# Patient Record
Sex: FEMALE | Race: WHITE | NOT HISPANIC OR LATINO | URBAN - METROPOLITAN AREA
[De-identification: names, ages, dates, MRNs, and addresses within clinical notes are randomized per-mention and may not be internally consistent; named-entity substitution may affect disease eponyms.]

---

## 2017-09-27 ENCOUNTER — INPATIENT (INPATIENT)
Facility: HOSPITAL | Age: 72
LOS: 1 days | Discharge: HOME | DRG: 564 | End: 2017-09-29
Attending: INTERNAL MEDICINE | Admitting: STUDENT IN AN ORGANIZED HEALTH CARE EDUCATION/TRAINING PROGRAM
Payer: MEDICARE

## 2017-09-27 DIAGNOSIS — F41.0 PANIC DISORDER [EPISODIC PAROXYSMAL ANXIETY]: ICD-10-CM
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
## 2017-09-27 DIAGNOSIS — Z86.19 PERSONAL HISTORY OF OTHER INFECTIOUS AND PARASITIC DISEASES: ICD-10-CM

## 2017-09-27 DIAGNOSIS — M62.82 RHABDOMYOLYSIS: ICD-10-CM

## 2017-09-27 DIAGNOSIS — S14.3XXA INJURY OF BRACHIAL PLEXUS, INITIAL ENCOUNTER: ICD-10-CM

## 2017-09-27 DIAGNOSIS — F33.9 MAJOR DEPRESSIVE DISORDER, RECURRENT, UNSPECIFIED: ICD-10-CM

## 2017-09-27 DIAGNOSIS — G93.40 ENCEPHALOPATHY, UNSPECIFIED: ICD-10-CM
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
## 2017-09-27 DIAGNOSIS — F17.210 NICOTINE DEPENDENCE, CIGARETTES, UNCOMPLICATED: ICD-10-CM

## 2017-09-27 DIAGNOSIS — T79.6XXA TRAUMATIC ISCHEMIA OF MUSCLE, INITIAL ENCOUNTER: ICD-10-CM

## 2017-09-27 DIAGNOSIS — M19.90 UNSPECIFIED OSTEOARTHRITIS, UNSPECIFIED SITE: ICD-10-CM

## 2017-09-27 PROCEDURE — 73060 X-RAY EXAM OF HUMERUS: CPT | Mod: 26,LT

## 2017-09-27 PROCEDURE — 71010: CPT | Mod: 26

## 2017-09-27 PROCEDURE — 72125 CT NECK SPINE W/O DYE: CPT | Mod: 26

## 2017-09-27 PROCEDURE — 70450 CT HEAD/BRAIN W/O DYE: CPT | Mod: 26

## 2017-09-27 PROCEDURE — 93010 ELECTROCARDIOGRAM REPORT: CPT

## 2017-09-27 PROCEDURE — 99223 1ST HOSP IP/OBS HIGH 75: CPT

## 2017-09-27 PROCEDURE — 73030 X-RAY EXAM OF SHOULDER: CPT | Mod: 26,LT

## 2017-09-28 ENCOUNTER — APPOINTMENT (OUTPATIENT)
Dept: MRI IMAGING | Facility: HOSPITAL | Age: 72
End: 2017-09-28

## 2017-09-28 PROCEDURE — 90792 PSYCH DIAG EVAL W/MED SRVCS: CPT

## 2017-09-28 PROCEDURE — 99233 SBSQ HOSP IP/OBS HIGH 50: CPT

## 2017-09-28 PROCEDURE — 93010 ELECTROCARDIOGRAM REPORT: CPT

## 2017-09-28 PROCEDURE — 70551 MRI BRAIN STEM W/O DYE: CPT | Mod: 26

## 2017-09-28 PROCEDURE — 93306 TTE W/DOPPLER COMPLETE: CPT | Mod: 26

## 2017-09-28 PROCEDURE — 72141 MRI NECK SPINE W/O DYE: CPT | Mod: 26

## 2017-09-29 PROCEDURE — 99233 SBSQ HOSP IP/OBS HIGH 50: CPT

## 2017-10-19 PROCEDURE — 80076 HEPATIC FUNCTION PANEL: CPT

## 2017-10-19 PROCEDURE — 82746 ASSAY OF FOLIC ACID SERUM: CPT

## 2017-10-19 PROCEDURE — 95812 EEG 41-60 MINUTES: CPT

## 2017-10-19 PROCEDURE — 80061 LIPID PANEL: CPT

## 2017-10-19 PROCEDURE — 85730 THROMBOPLASTIN TIME PARTIAL: CPT

## 2017-10-19 PROCEDURE — 82550 ASSAY OF CK (CPK): CPT

## 2017-10-19 PROCEDURE — 93306 TTE W/DOPPLER COMPLETE: CPT

## 2017-10-19 PROCEDURE — 70551 MRI BRAIN STEM W/O DYE: CPT

## 2017-10-19 PROCEDURE — 80053 COMPREHEN METABOLIC PANEL: CPT

## 2017-10-19 PROCEDURE — 96374 THER/PROPH/DIAG INJ IV PUSH: CPT

## 2017-10-19 PROCEDURE — 72141 MRI NECK SPINE W/O DYE: CPT

## 2017-10-19 PROCEDURE — 73060 X-RAY EXAM OF HUMERUS: CPT

## 2017-10-19 PROCEDURE — 84484 ASSAY OF TROPONIN QUANT: CPT

## 2017-10-19 PROCEDURE — 97116 GAIT TRAINING THERAPY: CPT

## 2017-10-19 PROCEDURE — 84443 ASSAY THYROID STIM HORMONE: CPT

## 2017-10-19 PROCEDURE — 73030 X-RAY EXAM OF SHOULDER: CPT

## 2017-10-19 PROCEDURE — 83036 HEMOGLOBIN GLYCOSYLATED A1C: CPT

## 2017-10-19 PROCEDURE — 82607 VITAMIN B-12: CPT

## 2017-10-19 PROCEDURE — 71045 X-RAY EXAM CHEST 1 VIEW: CPT

## 2017-10-19 PROCEDURE — 93005 ELECTROCARDIOGRAM TRACING: CPT

## 2017-10-19 PROCEDURE — 85027 COMPLETE CBC AUTOMATED: CPT

## 2017-10-19 PROCEDURE — 80307 DRUG TEST PRSMV CHEM ANLYZR: CPT

## 2017-10-19 PROCEDURE — 85610 PROTHROMBIN TIME: CPT

## 2017-10-19 PROCEDURE — 82553 CREATINE MB FRACTION: CPT

## 2017-10-19 PROCEDURE — 72125 CT NECK SPINE W/O DYE: CPT

## 2017-10-19 PROCEDURE — 99285 EMERGENCY DEPT VISIT HI MDM: CPT | Mod: 25

## 2017-10-19 PROCEDURE — 80048 BASIC METABOLIC PNL TOTAL CA: CPT

## 2017-10-19 PROCEDURE — 70450 CT HEAD/BRAIN W/O DYE: CPT

## 2017-10-19 PROCEDURE — 97162 PT EVAL MOD COMPLEX 30 MIN: CPT

## 2021-04-07 ENCOUNTER — INPATIENT (INPATIENT)
Facility: HOSPITAL | Age: 76
LOS: 1 days | Discharge: HOME | DRG: 603 | End: 2021-04-09
Attending: STUDENT IN AN ORGANIZED HEALTH CARE EDUCATION/TRAINING PROGRAM | Admitting: INTERNAL MEDICINE
Payer: MEDICARE

## 2021-04-07 VITALS
OXYGEN SATURATION: 100 % | HEART RATE: 86 BPM | WEIGHT: 149.91 LBS | HEIGHT: 60 IN | RESPIRATION RATE: 18 BRPM | SYSTOLIC BLOOD PRESSURE: 148 MMHG | TEMPERATURE: 98 F | DIASTOLIC BLOOD PRESSURE: 80 MMHG

## 2021-04-07 DIAGNOSIS — L03.119 CELLULITIS OF UNSPECIFIED PART OF LIMB: ICD-10-CM

## 2021-04-07 LAB
ALBUMIN SERPL ELPH-MCNC: 3.2 G/DL — LOW (ref 3.3–5)
ALBUMIN SERPL ELPH-MCNC: 3.5 G/DL — SIGNIFICANT CHANGE UP (ref 3.3–5)
ALP SERPL-CCNC: 81 U/L — SIGNIFICANT CHANGE UP (ref 40–120)
ALP SERPL-CCNC: 95 U/L — SIGNIFICANT CHANGE UP (ref 40–120)
ALT FLD-CCNC: 30 U/L — SIGNIFICANT CHANGE UP (ref 10–45)
ALT FLD-CCNC: 36 U/L — SIGNIFICANT CHANGE UP (ref 10–45)
ANION GAP SERPL CALC-SCNC: 11 MMOL/L — SIGNIFICANT CHANGE UP (ref 5–17)
ANION GAP SERPL CALC-SCNC: 12 MMOL/L — SIGNIFICANT CHANGE UP (ref 5–17)
ANION GAP SERPL CALC-SCNC: 7 MMOL/L — SIGNIFICANT CHANGE UP (ref 5–17)
APPEARANCE UR: ABNORMAL
AST SERPL-CCNC: 26 U/L — SIGNIFICANT CHANGE UP (ref 10–40)
AST SERPL-CCNC: 89 U/L — HIGH (ref 10–40)
BASOPHILS # BLD AUTO: 0.06 K/UL — SIGNIFICANT CHANGE UP (ref 0–0.2)
BASOPHILS NFR BLD AUTO: 0.6 % — SIGNIFICANT CHANGE UP (ref 0–2)
BILIRUB SERPL-MCNC: 0.2 MG/DL — SIGNIFICANT CHANGE UP (ref 0.2–1.2)
BILIRUB SERPL-MCNC: 0.7 MG/DL — SIGNIFICANT CHANGE UP (ref 0.2–1.2)
BILIRUB UR-MCNC: NEGATIVE — SIGNIFICANT CHANGE UP
BUN SERPL-MCNC: 20 MG/DL — SIGNIFICANT CHANGE UP (ref 7–23)
BUN SERPL-MCNC: 22 MG/DL — SIGNIFICANT CHANGE UP (ref 7–23)
BUN SERPL-MCNC: 22 MG/DL — SIGNIFICANT CHANGE UP (ref 7–23)
CALCIUM SERPL-MCNC: 9.2 MG/DL — SIGNIFICANT CHANGE UP (ref 8.4–10.5)
CALCIUM SERPL-MCNC: 9.3 MG/DL — SIGNIFICANT CHANGE UP (ref 8.4–10.5)
CALCIUM SERPL-MCNC: 9.5 MG/DL — SIGNIFICANT CHANGE UP (ref 8.4–10.5)
CHLORIDE SERPL-SCNC: 106 MMOL/L — SIGNIFICANT CHANGE UP (ref 96–108)
CHLORIDE SERPL-SCNC: 111 MMOL/L — HIGH (ref 96–108)
CHLORIDE SERPL-SCNC: 111 MMOL/L — HIGH (ref 96–108)
CK SERPL-CCNC: 122 U/L — SIGNIFICANT CHANGE UP (ref 25–170)
CO2 SERPL-SCNC: 24 MMOL/L — SIGNIFICANT CHANGE UP (ref 22–31)
CO2 SERPL-SCNC: 25 MMOL/L — SIGNIFICANT CHANGE UP (ref 22–31)
CO2 SERPL-SCNC: 26 MMOL/L — SIGNIFICANT CHANGE UP (ref 22–31)
COLOR SPEC: YELLOW — SIGNIFICANT CHANGE UP
CREAT SERPL-MCNC: 1 MG/DL — SIGNIFICANT CHANGE UP (ref 0.5–1.3)
CREAT SERPL-MCNC: 1.11 MG/DL — SIGNIFICANT CHANGE UP (ref 0.5–1.3)
CREAT SERPL-MCNC: 1.14 MG/DL — SIGNIFICANT CHANGE UP (ref 0.5–1.3)
DIFF PNL FLD: ABNORMAL
EOSINOPHIL # BLD AUTO: 0.22 K/UL — SIGNIFICANT CHANGE UP (ref 0–0.5)
EOSINOPHIL NFR BLD AUTO: 2.2 % — SIGNIFICANT CHANGE UP (ref 0–6)
GLUCOSE SERPL-MCNC: 94 MG/DL — SIGNIFICANT CHANGE UP (ref 70–99)
GLUCOSE SERPL-MCNC: 96 MG/DL — SIGNIFICANT CHANGE UP (ref 70–99)
GLUCOSE SERPL-MCNC: 96 MG/DL — SIGNIFICANT CHANGE UP (ref 70–99)
GLUCOSE UR QL: NEGATIVE — SIGNIFICANT CHANGE UP
HCT VFR BLD CALC: 44.1 % — SIGNIFICANT CHANGE UP (ref 34.5–45)
HGB BLD-MCNC: 14.2 G/DL — SIGNIFICANT CHANGE UP (ref 11.5–15.5)
IMM GRANULOCYTES NFR BLD AUTO: 0.3 % — SIGNIFICANT CHANGE UP (ref 0–1.5)
KETONES UR-MCNC: NEGATIVE — SIGNIFICANT CHANGE UP
LEUKOCYTE ESTERASE UR-ACNC: ABNORMAL
LYMPHOCYTES # BLD AUTO: 3.26 K/UL — SIGNIFICANT CHANGE UP (ref 1–3.3)
LYMPHOCYTES # BLD AUTO: 32.9 % — SIGNIFICANT CHANGE UP (ref 13–44)
MAGNESIUM SERPL-MCNC: 2.1 MG/DL — SIGNIFICANT CHANGE UP (ref 1.6–2.6)
MAGNESIUM SERPL-MCNC: 2.1 MG/DL — SIGNIFICANT CHANGE UP (ref 1.6–2.6)
MCHC RBC-ENTMCNC: 30 PG — SIGNIFICANT CHANGE UP (ref 27–34)
MCHC RBC-ENTMCNC: 32.2 GM/DL — SIGNIFICANT CHANGE UP (ref 32–36)
MCV RBC AUTO: 93 FL — SIGNIFICANT CHANGE UP (ref 80–100)
MONOCYTES # BLD AUTO: 0.85 K/UL — SIGNIFICANT CHANGE UP (ref 0–0.9)
MONOCYTES NFR BLD AUTO: 8.6 % — SIGNIFICANT CHANGE UP (ref 2–14)
NEUTROPHILS # BLD AUTO: 5.49 K/UL — SIGNIFICANT CHANGE UP (ref 1.8–7.4)
NEUTROPHILS NFR BLD AUTO: 55.4 % — SIGNIFICANT CHANGE UP (ref 43–77)
NITRITE UR-MCNC: POSITIVE
NRBC # BLD: 0 /100 WBCS — SIGNIFICANT CHANGE UP (ref 0–0)
PH UR: 5 — SIGNIFICANT CHANGE UP (ref 5–8)
PLATELET # BLD AUTO: 296 K/UL — SIGNIFICANT CHANGE UP (ref 150–400)
POTASSIUM SERPL-MCNC: 3.8 MMOL/L — SIGNIFICANT CHANGE UP (ref 3.5–5.3)
POTASSIUM SERPL-MCNC: 3.9 MMOL/L — SIGNIFICANT CHANGE UP (ref 3.5–5.3)
POTASSIUM SERPL-MCNC: >10 MMOL/L — CRITICAL HIGH (ref 3.5–5.3)
POTASSIUM SERPL-SCNC: 3.8 MMOL/L — SIGNIFICANT CHANGE UP (ref 3.5–5.3)
POTASSIUM SERPL-SCNC: 3.9 MMOL/L — SIGNIFICANT CHANGE UP (ref 3.5–5.3)
POTASSIUM SERPL-SCNC: >10 MMOL/L — CRITICAL HIGH (ref 3.5–5.3)
PROCALCITONIN SERPL-MCNC: 0.02 NG/ML — SIGNIFICANT CHANGE UP
PROT SERPL-MCNC: 6.6 G/DL — SIGNIFICANT CHANGE UP (ref 6–8.3)
PROT SERPL-MCNC: 8.4 G/DL — HIGH (ref 6–8.3)
PROT UR-MCNC: 100
RBC # BLD: 4.74 M/UL — SIGNIFICANT CHANGE UP (ref 3.8–5.2)
RBC # FLD: 14.1 % — SIGNIFICANT CHANGE UP (ref 10.3–14.5)
SARS-COV-2 RNA SPEC QL NAA+PROBE: SIGNIFICANT CHANGE UP
SODIUM SERPL-SCNC: 138 MMOL/L — SIGNIFICANT CHANGE UP (ref 135–145)
SODIUM SERPL-SCNC: 147 MMOL/L — HIGH (ref 135–145)
SODIUM SERPL-SCNC: 148 MMOL/L — HIGH (ref 135–145)
SP GR SPEC: 1.02 — SIGNIFICANT CHANGE UP (ref 1.01–1.02)
TROPONIN I SERPL-MCNC: <.017 NG/ML — LOW (ref 0.02–0.06)
UROBILINOGEN FLD QL: NEGATIVE — SIGNIFICANT CHANGE UP
WBC # BLD: 9.91 K/UL — SIGNIFICANT CHANGE UP (ref 3.8–10.5)
WBC # FLD AUTO: 9.91 K/UL — SIGNIFICANT CHANGE UP (ref 3.8–10.5)

## 2021-04-07 PROCEDURE — 71045 X-RAY EXAM CHEST 1 VIEW: CPT | Mod: 26

## 2021-04-07 PROCEDURE — 72110 X-RAY EXAM L-2 SPINE 4/>VWS: CPT | Mod: 26

## 2021-04-07 PROCEDURE — 93010 ELECTROCARDIOGRAM REPORT: CPT | Mod: 76

## 2021-04-07 PROCEDURE — 99285 EMERGENCY DEPT VISIT HI MDM: CPT | Mod: CS

## 2021-04-07 PROCEDURE — 99222 1ST HOSP IP/OBS MODERATE 55: CPT

## 2021-04-07 RX ORDER — SIMVASTATIN 20 MG/1
10 TABLET, FILM COATED ORAL AT BEDTIME
Refills: 0 | Status: DISCONTINUED | OUTPATIENT
Start: 2021-04-07 | End: 2021-04-09

## 2021-04-07 RX ORDER — ACETAMINOPHEN 500 MG
975 TABLET ORAL ONCE
Refills: 0 | Status: COMPLETED | OUTPATIENT
Start: 2021-04-07 | End: 2021-04-07

## 2021-04-07 RX ORDER — CEFTRIAXONE 500 MG/1
1000 INJECTION, POWDER, FOR SOLUTION INTRAMUSCULAR; INTRAVENOUS EVERY 24 HOURS
Refills: 0 | Status: DISCONTINUED | OUTPATIENT
Start: 2021-04-07 | End: 2021-04-09

## 2021-04-07 RX ORDER — MORPHINE SULFATE 50 MG/1
2 CAPSULE, EXTENDED RELEASE ORAL EVERY 6 HOURS
Refills: 0 | Status: DISCONTINUED | OUTPATIENT
Start: 2021-04-07 | End: 2021-04-09

## 2021-04-07 RX ORDER — ACETAMINOPHEN 500 MG
650 TABLET ORAL EVERY 6 HOURS
Refills: 0 | Status: DISCONTINUED | OUTPATIENT
Start: 2021-04-07 | End: 2021-04-09

## 2021-04-07 RX ORDER — GABAPENTIN 400 MG/1
100 CAPSULE ORAL THREE TIMES A DAY
Refills: 0 | Status: DISCONTINUED | OUTPATIENT
Start: 2021-04-07 | End: 2021-04-09

## 2021-04-07 RX ORDER — IBUPROFEN 200 MG
400 TABLET ORAL THREE TIMES A DAY
Refills: 0 | Status: DISCONTINUED | OUTPATIENT
Start: 2021-04-07 | End: 2021-04-09

## 2021-04-07 RX ORDER — OXYCODONE AND ACETAMINOPHEN 5; 325 MG/1; MG/1
1 TABLET ORAL EVERY 4 HOURS
Refills: 0 | Status: DISCONTINUED | OUTPATIENT
Start: 2021-04-07 | End: 2021-04-09

## 2021-04-07 RX ORDER — IBUPROFEN 200 MG
400 TABLET ORAL ONCE
Refills: 0 | Status: COMPLETED | OUTPATIENT
Start: 2021-04-07 | End: 2021-04-07

## 2021-04-07 RX ORDER — DULOXETINE HYDROCHLORIDE 30 MG/1
20 CAPSULE, DELAYED RELEASE ORAL
Refills: 0 | Status: DISCONTINUED | OUTPATIENT
Start: 2021-04-07 | End: 2021-04-09

## 2021-04-07 RX ORDER — ENOXAPARIN SODIUM 100 MG/ML
40 INJECTION SUBCUTANEOUS DAILY
Refills: 0 | Status: DISCONTINUED | OUTPATIENT
Start: 2021-04-07 | End: 2021-04-09

## 2021-04-07 RX ORDER — NICOTINE POLACRILEX 2 MG
1 GUM BUCCAL DAILY
Refills: 0 | Status: DISCONTINUED | OUTPATIENT
Start: 2021-04-07 | End: 2021-04-09

## 2021-04-07 RX ADMIN — OXYCODONE AND ACETAMINOPHEN 1 TABLET(S): 5; 325 TABLET ORAL at 20:13

## 2021-04-07 RX ADMIN — Medication 400 MILLIGRAM(S): at 04:00

## 2021-04-07 RX ADMIN — OXYCODONE AND ACETAMINOPHEN 1 TABLET(S): 5; 325 TABLET ORAL at 15:18

## 2021-04-07 RX ADMIN — Medication 400 MILLIGRAM(S): at 03:19

## 2021-04-07 RX ADMIN — OXYCODONE AND ACETAMINOPHEN 1 TABLET(S): 5; 325 TABLET ORAL at 14:48

## 2021-04-07 RX ADMIN — Medication 975 MILLIGRAM(S): at 04:00

## 2021-04-07 RX ADMIN — GABAPENTIN 100 MILLIGRAM(S): 400 CAPSULE ORAL at 20:13

## 2021-04-07 RX ADMIN — Medication 975 MILLIGRAM(S): at 03:19

## 2021-04-07 RX ADMIN — DULOXETINE HYDROCHLORIDE 20 MILLIGRAM(S): 30 CAPSULE, DELAYED RELEASE ORAL at 12:13

## 2021-04-07 RX ADMIN — Medication 1 TABLET(S): at 12:12

## 2021-04-07 RX ADMIN — GABAPENTIN 100 MILLIGRAM(S): 400 CAPSULE ORAL at 12:12

## 2021-04-07 RX ADMIN — ENOXAPARIN SODIUM 40 MILLIGRAM(S): 100 INJECTION SUBCUTANEOUS at 12:12

## 2021-04-07 RX ADMIN — MORPHINE SULFATE 2 MILLIGRAM(S): 50 CAPSULE, EXTENDED RELEASE ORAL at 16:28

## 2021-04-07 RX ADMIN — DULOXETINE HYDROCHLORIDE 20 MILLIGRAM(S): 30 CAPSULE, DELAYED RELEASE ORAL at 22:03

## 2021-04-07 RX ADMIN — Medication 100 MILLIGRAM(S): at 05:15

## 2021-04-07 RX ADMIN — Medication 1 PATCH: at 12:12

## 2021-04-07 RX ADMIN — CEFTRIAXONE 100 MILLIGRAM(S): 500 INJECTION, POWDER, FOR SOLUTION INTRAMUSCULAR; INTRAVENOUS at 06:08

## 2021-04-07 RX ADMIN — Medication 1 PATCH: at 19:43

## 2021-04-07 RX ADMIN — OXYCODONE AND ACETAMINOPHEN 1 TABLET(S): 5; 325 TABLET ORAL at 20:36

## 2021-04-07 RX ADMIN — SIMVASTATIN 10 MILLIGRAM(S): 20 TABLET, FILM COATED ORAL at 22:03

## 2021-04-07 NOTE — H&P ADULT - ASSESSMENT
76 y/o female with obesity, HLD, anxiety, agoraphobia, BIBEMS to the ED because of back pain, s/p sliding from toilet bowl.  Pt walks with a walker, has poor balance. Also noted to have increased leg swelling and redness. However,  states that gait problem has worsened for past few months, and has difficulty managing pt at home.   Noted to have hyperkalemia, called lab and lab states specimen severely hemolyzed.  Back pain, s/p fall, Gait problem  UTI, Cellulitis, Left leg >right leg, PVD  Smoker  Hyperkalemia (specimen hemolyzed >10?) - no notable EKG changes    Admit  IV Ceftriaxone pending cultures  Analgesics prn  Leg Dopplers to r/o DVT  Repeat Lab stat - re hyperkalemia   PT eval, social work referral  Cont current meds: Cymbalta, gabapentin, Simvastatin  DVT prophylaxis    IMPROVE VTE Individual Risk Assessment  RISK                                                                Points  [  ] Previous VTE                                                  3  [  ] Thrombophilia                                               2  [  ] Lower limb paralysis                                      2        (unable to hold up >15 seconds)    [  ] Current Cancer                                              2         (within 6 months)  [x  ] Immobilization > 24 hrs                                1  [  ] ICU/CCU stay > 24 hours                              1  [ x ] Age > 60                                                      1  IMPROVE VTE Score __2_______  IMPROVE Score 0-1: Low Risk, No VTE prophylaxis required for most patients, encourage ambulation.   IMPROVE Score 2-3: At risk, pharmacologic VTE prophylaxis is indicated for most patients (in the absence of a contraindication)  IMPROVE Score > or = 4: High Risk, pharmacologic VTE prophylaxis is indicated for most patients (in the absence of a contraindication) 76 y/o female with obesity, HLD, anxiety, agoraphobia, BIBEMS to the ED because of back pain, s/p sliding from toilet bowl.  Pt walks with a walker, has poor balance. Also noted to have increased leg swelling and redness. However,  states that gait problem has worsened for past few months, and has difficulty managing pt at home.   Noted to have hyperkalemia, called lab and lab states specimen severely hemolyzed.  Back pain, s/p fall, Gait problem  UTI, Cellulitis, Left leg >right leg, PVD  Smoker  Hyperkalemia (specimen hemolyzed >10?) - no notable EKG changes    Admit  IV Ceftriaxone pending cultures  Analgesics prn  Leg Dopplers to r/o DVT  smoking cessation- counseled, Nicotine patch  Repeat Lab stat - re hyperkalemia   PT eval, social work referral  Cont current meds: Cymbalta, gabapentin, Simvastatin  DVT prophylaxis    IMPROVE VTE Individual Risk Assessment  RISK                                                                Points  [  ] Previous VTE                                                  3  [  ] Thrombophilia                                               2  [  ] Lower limb paralysis                                      2        (unable to hold up >15 seconds)    [  ] Current Cancer                                              2         (within 6 months)  [x  ] Immobilization > 24 hrs                                1  [  ] ICU/CCU stay > 24 hours                              1  [ x ] Age > 60                                                      1  IMPROVE VTE Score __2_______  IMPROVE Score 0-1: Low Risk, No VTE prophylaxis required for most patients, encourage ambulation.   IMPROVE Score 2-3: At risk, pharmacologic VTE prophylaxis is indicated for most patients (in the absence of a contraindication)  IMPROVE Score > or = 4: High Risk, pharmacologic VTE prophylaxis is indicated for most patients (in the absence of a contraindication)

## 2021-04-07 NOTE — ED ADULT NURSE NOTE - OBJECTIVE STATEMENT
Patient BIBA from home s/p mechanical fall. Patient denies head strike or LOC. Patient not on anticoagulants. Complaining of 9/10 back pain at this time. Lower extremities appear cellulitic.

## 2021-04-07 NOTE — PHYSICAL THERAPY INITIAL EVALUATION ADULT - ADDITIONAL COMMENTS
pt lives in private house w/spouse and dtr, 1 aleks w/rail, no stairs in house that pt uses. pt uses a rollator to ambulate in house, also has a straight cane. pt states she is independent w/most ADL's but needs assist w/showering

## 2021-04-07 NOTE — ED PROVIDER NOTE - CLINICAL SUMMARY MEDICAL DECISION MAKING FREE TEXT BOX
74 yo F s/p fall off toilet tonight with low back pain. no neuro deficit. check xray LS Spine r/o fx vs contusion/sprain. chronic debilitation, unable to walk, worsening general condition, weakness.  r/o leg cellulitis. r/o uti, electrolyte abnormality, anemia. reassess 74 yo F s/p fall off toilet tonight with low back pain. no neuro deficit. check xray LS Spine r/o fx vs contusion/sprain. chronic debilitation, unable to walk, worsening general condition, weakness.  + leg cellulitis. r/o uti, electrolyte abnormality, anemia. reassess

## 2021-04-07 NOTE — ED PROVIDER NOTE - PMH
Anxiety    PTSD (post-traumatic stress disorder)     Anxiety    High cholesterol    Hypertension    PTSD (post-traumatic stress disorder)

## 2021-04-07 NOTE — CHART NOTE - NSCHARTNOTEFT_GEN_A_CORE
H+P reviewed from this AM.    Repeat BMP shows normal potassium (>10 potassium overnight reported as hemolyzed)    Patient awaiting PT consult for possible [facility] placement secondary to frequent falls.    Likely will need 3 night stay for medicare requirement.     Will continue to follow today.

## 2021-04-07 NOTE — H&P ADULT - HISTORY OF PRESENT ILLNESS
76 y/o female with obesity, HLD, anxiety, agoraphobia, BIBEMS to the ED because of backpain, s/p sliding from toilet bowl.  Pt walks with a walker, has poor balance.  She denies hitting head nor LOC, just hit her buttocks, sliding off toilet bowl when she was about to wipe herself.  Pt apparently has been having difficulty walking for several months, walks with walker, but has severe anxiety and does not want to get out of house.  Has bilateral LE swelling, noted to be with increased redness and warmth for the past week.  She denies fever, chills, chest pain, dyspnea. Pt states she has PT that comes to the house and she does PT 3 x a week.  However,  states that gait problem has worsened for past few months, and has difficulty managing pt at home.  74 y/o female with obesity, HLD, anxiety, agoraphobia, BIBEMS to the ED because of backpain, s/p sliding from toilet bowl.  Pt walks with a walker, has poor balance.  She denies hitting head nor LOC, just hit her buttocks, sliding off toilet bowl when she was about to wipe herself.  Pt apparently has been having difficulty walking for several months, walks with walker, but has severe anxiety and does not want to get out of house.  Has bilateral LE swelling, noted to be with increased redness and warmth for the past week.  She denies fever, chills, chest pain, dyspnea,abd pain, dysuria. Pt states she has PT that comes to the house and she does PT 3 x a week.  However,  states that gait problem has worsened for past few months, and has difficulty managing pt at home.

## 2021-04-07 NOTE — ED PROVIDER NOTE - CARE PLAN
Principal Discharge DX:	Cellulitis of lower extremity, unspecified laterality  Secondary Diagnosis:	Acute midline low back pain without sciatica

## 2021-04-07 NOTE — H&P ADULT - NSHPPHYSICALEXAM_GEN_ALL_CORE
Vital Signs (24 Hrs):  T(C): 36.4 (04-07-21 @ 02:34), Max: 36.4 (04-07-21 @ 02:34)  HR: 86 (04-07-21 @ 02:34) (86 - 86)  BP: 148/80 (04-07-21 @ 02:34) (148/80 - 148/80)  RR: 18 (04-07-21 @ 02:34) (18 - 18)  SpO2: 100% (04-07-21 @ 02:34) (100% - 100%)  Daily Height in cm: 152.4 (07 Apr 2021 02:34)

## 2021-04-07 NOTE — ED PROVIDER NOTE - OBJECTIVE STATEMENT
pt c/o low back pain, sharp, moderate, after fall off toilet about 30min pta tonight. pt was sitting on new toilet that is higher than her old one and was trying to change her diaper when she slid off toilet and fell on her buttock.  c/o back pain after. no numbness/weakness/incontinence. did not hit head. no headache/LOC. no recent illness. no fever/chills, chest pain, abd pain sob. pt has h/o chronic pain, is scheduled to see pain management this week. pt c/o low back pain, sharp, moderate, after fall off toilet about 30min pta tonight. pt was sitting on new toilet that is higher than her old one and was trying to change her diaper when she slid off toilet and fell on her buttock.  c/o back pain after. no numbness/weakness/incontinence. did not hit head. no headache/LOC. no recent illness. no fever/chills, chest pain, abd pain sob. pt has h/o chronic pain, is scheduled to see pain management this week. pt reports unable to walk for last 6 months, with worsening leg pain/swelling/redness recently.  states PMD Reyes had recommended admission and rehab in past but pt suffers from agoraphobia and has not wanted to leave home. pt c/o low back pain, sharp, moderate, after fall off toilet about 30min pta tonight. pt was sitting on new toilet that is higher than her old one and was trying to change her diaper when she slid off toilet and fell on her buttock.  c/o back pain after. no numbness/weakness/incontinence. did not hit head. no headache/LOC. no recent illness. no fever/chills, chest pain, abd pain sob. pt reports unable to walk for last 6 months, with worsening leg pain/swelling/redness recently.  states PMD Reyes had recommended admission and rehab in past but pt suffers from agoraphobia and has not wanted to leave home.

## 2021-04-07 NOTE — ED PROVIDER NOTE - MUSCULOSKELETAL, MLM
no c/t spine ttp. mild lower lumbar/sacral midline ttp.   mild bilat lower leg edema with moderate erythema, mildly increased warmth

## 2021-04-07 NOTE — H&P ADULT - NSICDXPASTMEDICALHX_GEN_ALL_CORE_FT
PAST MEDICAL HISTORY:  Agoraphobia severe as per  - has not left house since 2016 (is being seen by NP at home)    Anxiety     High cholesterol     Hypertension     PTSD (post-traumatic stress disorder)

## 2021-04-07 NOTE — ED ADULT NURSE NOTE - PMH
Agoraphobia  severe as per  - has not left house since 2016 (is being seen by NP at home)  Anxiety    High cholesterol    Hypertension    PTSD (post-traumatic stress disorder)

## 2021-04-07 NOTE — INPATIENT CERTIFICATION FOR MEDICARE PATIENTS - RISKS OF ADVERSE EVENTS
Concern for delay in diagnosis and treatment frequent falls, gait abnormality/Concern for worsening infectious process/Concern for delay in diagnosis and treatment/Other:

## 2021-04-08 LAB
ANION GAP SERPL CALC-SCNC: 11 MMOL/L — SIGNIFICANT CHANGE UP (ref 5–17)
BUN SERPL-MCNC: 19 MG/DL — SIGNIFICANT CHANGE UP (ref 7–23)
CALCIUM SERPL-MCNC: 9.3 MG/DL — SIGNIFICANT CHANGE UP (ref 8.4–10.5)
CHLORIDE SERPL-SCNC: 111 MMOL/L — HIGH (ref 96–108)
CO2 SERPL-SCNC: 24 MMOL/L — SIGNIFICANT CHANGE UP (ref 22–31)
COVID-19 SPIKE DOMAIN AB INTERP: POSITIVE
COVID-19 SPIKE DOMAIN ANTIBODY RESULT: 1.48 U/ML — HIGH
CREAT SERPL-MCNC: 1.07 MG/DL — SIGNIFICANT CHANGE UP (ref 0.5–1.3)
GLUCOSE SERPL-MCNC: 89 MG/DL — SIGNIFICANT CHANGE UP (ref 70–99)
HCV AB S/CO SERPL IA: 10.16 S/CO — HIGH (ref 0–0.99)
HCV AB SERPL-IMP: REACTIVE
POTASSIUM SERPL-MCNC: 4.1 MMOL/L — SIGNIFICANT CHANGE UP (ref 3.5–5.3)
POTASSIUM SERPL-SCNC: 4.1 MMOL/L — SIGNIFICANT CHANGE UP (ref 3.5–5.3)
SARS-COV-2 IGG+IGM SERPL QL IA: 1.48 U/ML — HIGH
SARS-COV-2 IGG+IGM SERPL QL IA: POSITIVE
SODIUM SERPL-SCNC: 146 MMOL/L — HIGH (ref 135–145)
TSH SERPL-MCNC: 15.1 UIU/ML — HIGH (ref 0.27–4.2)
VIT B12 SERPL-MCNC: 739 PG/ML — SIGNIFICANT CHANGE UP (ref 232–1245)

## 2021-04-08 PROCEDURE — 93970 EXTREMITY STUDY: CPT | Mod: 26

## 2021-04-08 PROCEDURE — 99232 SBSQ HOSP IP/OBS MODERATE 35: CPT

## 2021-04-08 RX ORDER — NYSTATIN CREAM 100000 [USP'U]/G
1 CREAM TOPICAL
Refills: 0 | Status: DISCONTINUED | OUTPATIENT
Start: 2021-04-08 | End: 2021-04-09

## 2021-04-08 RX ADMIN — MORPHINE SULFATE 2 MILLIGRAM(S): 50 CAPSULE, EXTENDED RELEASE ORAL at 05:18

## 2021-04-08 RX ADMIN — MORPHINE SULFATE 2 MILLIGRAM(S): 50 CAPSULE, EXTENDED RELEASE ORAL at 05:40

## 2021-04-08 RX ADMIN — GABAPENTIN 100 MILLIGRAM(S): 400 CAPSULE ORAL at 22:39

## 2021-04-08 RX ADMIN — CEFTRIAXONE 100 MILLIGRAM(S): 500 INJECTION, POWDER, FOR SOLUTION INTRAMUSCULAR; INTRAVENOUS at 05:18

## 2021-04-08 RX ADMIN — ENOXAPARIN SODIUM 40 MILLIGRAM(S): 100 INJECTION SUBCUTANEOUS at 13:33

## 2021-04-08 RX ADMIN — Medication 1 PATCH: at 13:33

## 2021-04-08 RX ADMIN — Medication 1 TABLET(S): at 13:30

## 2021-04-08 RX ADMIN — MORPHINE SULFATE 2 MILLIGRAM(S): 50 CAPSULE, EXTENDED RELEASE ORAL at 19:17

## 2021-04-08 RX ADMIN — MORPHINE SULFATE 2 MILLIGRAM(S): 50 CAPSULE, EXTENDED RELEASE ORAL at 11:30

## 2021-04-08 RX ADMIN — DULOXETINE HYDROCHLORIDE 20 MILLIGRAM(S): 30 CAPSULE, DELAYED RELEASE ORAL at 22:38

## 2021-04-08 RX ADMIN — DULOXETINE HYDROCHLORIDE 20 MILLIGRAM(S): 30 CAPSULE, DELAYED RELEASE ORAL at 13:30

## 2021-04-08 RX ADMIN — Medication 1 PATCH: at 07:47

## 2021-04-08 RX ADMIN — GABAPENTIN 100 MILLIGRAM(S): 400 CAPSULE ORAL at 13:33

## 2021-04-08 RX ADMIN — GABAPENTIN 100 MILLIGRAM(S): 400 CAPSULE ORAL at 05:19

## 2021-04-08 RX ADMIN — MORPHINE SULFATE 2 MILLIGRAM(S): 50 CAPSULE, EXTENDED RELEASE ORAL at 11:13

## 2021-04-08 RX ADMIN — Medication 1 PATCH: at 13:30

## 2021-04-08 RX ADMIN — NYSTATIN CREAM 1 APPLICATION(S): 100000 CREAM TOPICAL at 05:18

## 2021-04-08 RX ADMIN — MORPHINE SULFATE 2 MILLIGRAM(S): 50 CAPSULE, EXTENDED RELEASE ORAL at 17:40

## 2021-04-08 NOTE — PROGRESS NOTE ADULT - SUBJECTIVE AND OBJECTIVE BOX
Patient is a 75y old  Female who presents with a chief complaint of back pain, s/p fall, gait abn, leg swelling and redness (2021 05:01)      Patient seen and examined at bedside.    ALLERGIES:  epinephrine (Unknown)  norepinephrine (Unknown)    MEDICATIONS  (STANDING):  cefTRIAXone   IVPB 1000 milliGRAM(s) IV Intermittent every 24 hours  DULoxetine 20 milliGRAM(s) Oral two times a day  enoxaparin Injectable 40 milliGRAM(s) SubCutaneous daily  gabapentin 100 milliGRAM(s) Oral three times a day  multivitamin 1 Tablet(s) Oral daily  nicotine - 21 mG/24Hr(s) Patch 1 patch Transdermal daily  nystatin Powder 1 Application(s) Topical two times a day  simvastatin 10 milliGRAM(s) Oral at bedtime    MEDICATIONS  (PRN):  acetaminophen   Tablet .. 650 milliGRAM(s) Oral every 6 hours PRN Temp greater or equal to 38C (100.4F), Mild Pain (1 - 3)  ibuprofen  Tablet. 400 milliGRAM(s) Oral three times a day PRN Moderate Pain (4 - 6)  morphine  - Injectable 2 milliGRAM(s) IV Push every 6 hours PRN Severe Pain (7 - 10)  oxycodone    5 mG/acetaminophen 325 mG 1 Tablet(s) Oral every 4 hours PRN Moderate Pain (4 - 6)    Vital Signs Last 24 Hrs  T(F): 98.7 (2021 06:08), Max: 98.8 (2021 21:07)  HR: 82 (2021 06:08) (70 - 87)  BP: 132/81 (2021 06:08) (111/75 - 157/85)  RR: 15 (2021 06:08) (15 - 16)  SpO2: 96% (2021 06:08) (94% - 98%)  I&O's Summary    BMI (kg/m2): 29.3 (- @ 02:34)  PHYSICAL EXAM:  General: NAD, A/O x 3  ENT: MMM, no scleral icterus  Neck: Supple, No JVD, no thyroidomegaly  Lungs: Clear to auscultation bilaterally, no wheezes, no rales, no rhonchi, good inspiratory effort  Cardio: RRR, S1/S2, No murmurs  Abdomen: Soft, Nontender, Nondistended; Bowel sounds present  Extremities: No calf tenderness, No pitting edema, no skin changes    LABS:                        14.2   9.91  )-----------( 296      ( 2021 03:35 )             44.1       04-    146  |  111  |  19  ----------------------------<  89  4.1   |  24  |  1.07    Ca    9.3      2021 07:00  Mg     2.1     -    TPro  6.6  /  Alb  3.2  /  TBili  0.2  /  DBili  x   /  AST  26  /  ALT  30  /  AlkPhos  81  -     eGFR if Non African American: 52 mL/min/1.73M2 (21 @ 07:00)  eGFR if African American: 60 mL/min/1.73M2 (21 @ 07:00)    CARDIAC MARKERS ( 2021 06:30 )  <.017 ng/mL / x     / 122 U/L / x     / x        Urinalysis Basic - ( 2021 03:35 )    Color: Yellow / Appearance: Slightly Turbid / S.020 / pH: x  Gluc: x / Ketone: Negative  / Bili: Negative / Urobili: Negative   Blood: x / Protein: 100 / Nitrite: Positive   Leuk Esterase: Moderate / RBC: 11-25 /HPF / WBC >50 /HPF   Sq Epi: x / Non Sq Epi: Moderate / Bacteria: Many /HPF    RADIOLOGY & ADDITIONAL TESTS:  Care Discussed with Consultants/Other Providers:

## 2021-04-08 NOTE — PROGRESS NOTE ADULT - ASSESSMENT
75W PMH obesity, HLD, anxiety, agoraphobia, BIBEMS to the ED because of back pain, s/p sliding from toilet bowl.  Pt walks with a walker, has poor balance. Also noted to have increased leg swelling and redness. However,  states that gait problem has worsened for past few months, and has difficulty managing pt at home.    Ambulatory Dysfunction  Gait Disorder  LE cellulitis  - PT evaluated and patient with frequent falls and difficulty ambulatory independently.  - cont ceftriaxone  - awaiting US    Hypernatremia  - mildly elevated  - monitor BMP    Anxiety/Agoraphobia  Cymbalta, gabapentin, Simvastatin    DVT prophylaxis  - lovenox

## 2021-04-09 ENCOUNTER — TRANSCRIPTION ENCOUNTER (OUTPATIENT)
Age: 76
End: 2021-04-09

## 2021-04-09 VITALS
RESPIRATION RATE: 18 BRPM | HEART RATE: 88 BPM | DIASTOLIC BLOOD PRESSURE: 68 MMHG | OXYGEN SATURATION: 95 % | SYSTOLIC BLOOD PRESSURE: 128 MMHG | TEMPERATURE: 99 F

## 2021-04-09 PROBLEM — E78.00 PURE HYPERCHOLESTEROLEMIA, UNSPECIFIED: Chronic | Status: ACTIVE | Noted: 2021-04-07

## 2021-04-09 PROBLEM — F40.00 AGORAPHOBIA, UNSPECIFIED: Chronic | Status: ACTIVE | Noted: 2021-04-07

## 2021-04-09 PROBLEM — I10 ESSENTIAL (PRIMARY) HYPERTENSION: Chronic | Status: ACTIVE | Noted: 2021-04-07

## 2021-04-09 LAB
-  AMIKACIN: SIGNIFICANT CHANGE UP
-  AMOXICILLIN/CLAVULANIC ACID: SIGNIFICANT CHANGE UP
-  AMPICILLIN/SULBACTAM: SIGNIFICANT CHANGE UP
-  AMPICILLIN: SIGNIFICANT CHANGE UP
-  AZTREONAM: SIGNIFICANT CHANGE UP
-  CEFAZOLIN: SIGNIFICANT CHANGE UP
-  CEFEPIME: SIGNIFICANT CHANGE UP
-  CEFOXITIN: SIGNIFICANT CHANGE UP
-  CEFTRIAXONE: SIGNIFICANT CHANGE UP
-  CIPROFLOXACIN: SIGNIFICANT CHANGE UP
-  ERTAPENEM: SIGNIFICANT CHANGE UP
-  GENTAMICIN: SIGNIFICANT CHANGE UP
-  IMIPENEM: SIGNIFICANT CHANGE UP
-  LEVOFLOXACIN: SIGNIFICANT CHANGE UP
-  MEROPENEM: SIGNIFICANT CHANGE UP
-  NITROFURANTOIN: SIGNIFICANT CHANGE UP
-  PIPERACILLIN/TAZOBACTAM: SIGNIFICANT CHANGE UP
-  TIGECYCLINE: SIGNIFICANT CHANGE UP
-  TOBRAMYCIN: SIGNIFICANT CHANGE UP
-  TRIMETHOPRIM/SULFAMETHOXAZOLE: SIGNIFICANT CHANGE UP
CULTURE RESULTS: SIGNIFICANT CHANGE UP
METHOD TYPE: SIGNIFICANT CHANGE UP
ORGANISM # SPEC MICROSCOPIC CNT: SIGNIFICANT CHANGE UP
ORGANISM # SPEC MICROSCOPIC CNT: SIGNIFICANT CHANGE UP
SPECIMEN SOURCE: SIGNIFICANT CHANGE UP
T4 AB SER-ACNC: 5.7 UG/DL — SIGNIFICANT CHANGE UP (ref 4.6–12)

## 2021-04-09 PROCEDURE — 84436 ASSAY OF TOTAL THYROXINE: CPT

## 2021-04-09 PROCEDURE — 83735 ASSAY OF MAGNESIUM: CPT

## 2021-04-09 PROCEDURE — 82550 ASSAY OF CK (CPK): CPT

## 2021-04-09 PROCEDURE — 99285 EMERGENCY DEPT VISIT HI MDM: CPT | Mod: 25

## 2021-04-09 PROCEDURE — 81001 URINALYSIS AUTO W/SCOPE: CPT

## 2021-04-09 PROCEDURE — 93970 EXTREMITY STUDY: CPT

## 2021-04-09 PROCEDURE — 86769 SARS-COV-2 COVID-19 ANTIBODY: CPT

## 2021-04-09 PROCEDURE — 93005 ELECTROCARDIOGRAM TRACING: CPT

## 2021-04-09 PROCEDURE — 84145 PROCALCITONIN (PCT): CPT

## 2021-04-09 PROCEDURE — 80053 COMPREHEN METABOLIC PANEL: CPT

## 2021-04-09 PROCEDURE — 82607 VITAMIN B-12: CPT

## 2021-04-09 PROCEDURE — 87521 HEPATITIS C PROBE&RVRS TRNSC: CPT

## 2021-04-09 PROCEDURE — 87077 CULTURE AEROBIC IDENTIFY: CPT

## 2021-04-09 PROCEDURE — 36000 PLACE NEEDLE IN VEIN: CPT

## 2021-04-09 PROCEDURE — 71045 X-RAY EXAM CHEST 1 VIEW: CPT

## 2021-04-09 PROCEDURE — 84484 ASSAY OF TROPONIN QUANT: CPT

## 2021-04-09 PROCEDURE — 87186 SC STD MICRODIL/AGAR DIL: CPT

## 2021-04-09 PROCEDURE — 85025 COMPLETE CBC W/AUTO DIFF WBC: CPT

## 2021-04-09 PROCEDURE — 36415 COLL VENOUS BLD VENIPUNCTURE: CPT

## 2021-04-09 PROCEDURE — 99239 HOSP IP/OBS DSCHRG MGMT >30: CPT

## 2021-04-09 PROCEDURE — 86803 HEPATITIS C AB TEST: CPT

## 2021-04-09 PROCEDURE — 80048 BASIC METABOLIC PNL TOTAL CA: CPT

## 2021-04-09 PROCEDURE — 87086 URINE CULTURE/COLONY COUNT: CPT

## 2021-04-09 PROCEDURE — 72110 X-RAY EXAM L-2 SPINE 4/>VWS: CPT

## 2021-04-09 PROCEDURE — 84443 ASSAY THYROID STIM HORMONE: CPT

## 2021-04-09 PROCEDURE — 87635 SARS-COV-2 COVID-19 AMP PRB: CPT

## 2021-04-09 RX ORDER — NICOTINE POLACRILEX 2 MG
1 GUM BUCCAL
Qty: 30 | Refills: 0
Start: 2021-04-09 | End: 2021-05-08

## 2021-04-09 RX ORDER — CEFUROXIME AXETIL 250 MG
1 TABLET ORAL
Qty: 6 | Refills: 0
Start: 2021-04-09 | End: 2021-04-11

## 2021-04-09 RX ORDER — NYSTATIN CREAM 100000 [USP'U]/G
1 CREAM TOPICAL
Qty: 0 | Refills: 0 | DISCHARGE
Start: 2021-04-09

## 2021-04-09 RX ADMIN — NYSTATIN CREAM 1 APPLICATION(S): 100000 CREAM TOPICAL at 11:33

## 2021-04-09 RX ADMIN — MORPHINE SULFATE 2 MILLIGRAM(S): 50 CAPSULE, EXTENDED RELEASE ORAL at 10:20

## 2021-04-09 RX ADMIN — SIMVASTATIN 10 MILLIGRAM(S): 20 TABLET, FILM COATED ORAL at 00:07

## 2021-04-09 RX ADMIN — MORPHINE SULFATE 2 MILLIGRAM(S): 50 CAPSULE, EXTENDED RELEASE ORAL at 10:50

## 2021-04-09 RX ADMIN — DULOXETINE HYDROCHLORIDE 20 MILLIGRAM(S): 30 CAPSULE, DELAYED RELEASE ORAL at 11:32

## 2021-04-09 RX ADMIN — Medication 1 PATCH: at 16:38

## 2021-04-09 RX ADMIN — ENOXAPARIN SODIUM 40 MILLIGRAM(S): 100 INJECTION SUBCUTANEOUS at 11:32

## 2021-04-09 RX ADMIN — GABAPENTIN 100 MILLIGRAM(S): 400 CAPSULE ORAL at 06:25

## 2021-04-09 RX ADMIN — NYSTATIN CREAM 1 APPLICATION(S): 100000 CREAM TOPICAL at 06:20

## 2021-04-09 RX ADMIN — Medication 1 TABLET(S): at 11:32

## 2021-04-09 RX ADMIN — GABAPENTIN 100 MILLIGRAM(S): 400 CAPSULE ORAL at 16:33

## 2021-04-09 RX ADMIN — CEFTRIAXONE 100 MILLIGRAM(S): 500 INJECTION, POWDER, FOR SOLUTION INTRAMUSCULAR; INTRAVENOUS at 06:20

## 2021-04-09 RX ADMIN — OXYCODONE AND ACETAMINOPHEN 1 TABLET(S): 5; 325 TABLET ORAL at 16:38

## 2021-04-09 RX ADMIN — Medication 1 PATCH: at 06:25

## 2021-04-09 NOTE — DISCHARGE NOTE PROVIDER - NSDCMRMEDTOKEN_GEN_ALL_CORE_FT
cefuroxime 250 mg oral tablet: 1 tab(s) orally every 12 hours   Cymbalta 20 mg oral delayed release capsule: 1 cap(s) orally 2 times a day  gabapentin 100 mg oral capsule: 1 cap(s) orally 3 times a day  Multiple Vitamins oral tablet: 1 tab(s) orally once a day  nicotine 21 mg/24 hr transdermal film, extended release: 1 patch transdermal once a day  nystatin 100,000 units/g topical powder: 1 application topically 2 times a day  oxycodone-acetaminophen 5 mg-325 mg oral tablet: 1 tab(s) orally every 4 hours, As needed, Moderate Pain (4 - 6) MDD:4 tabs  simvastatin 10 mg oral tablet: 1 tab(s) orally once a day (at bedtime)

## 2021-04-09 NOTE — DISCHARGE NOTE PROVIDER - HOSPITAL COURSE
Hospital Course  74 y/o female with obesity, HLD, anxiety, agoraphobia, BIBEMS to the ED because of back pain, s/p sliding from toilet bowl.  Pt walks with a walker, has poor balance. Also noted to have increased leg swelling and redness.     1.  Back pain, s/p fall with Gait dysfunction  Hx of chronic pain secondary to RA  -PT eval noted; rec is for home with home PT  -continue pain mgmt; Cymbalta, Gabapentin, Tylenol prn, Ibuprofen prn, Morphine prn  -added Percocet on for prn for severe pain for discharge  -recommend outpatient Pain Mgmt Clinic    2.  Simple UTI  -culture + for Klebsiella, Raoutella  -oral Ceftin for D/C    3.  possible Cellulitis at LLE  chronic PVD  -on oral antibx    4.  Elevated TSH - 15.1  Serum T4: 5.7 --in normal range; thyroid gland probably with normal function    5.  Agoraphobia  -outpatient Behavioral Health f/u  -continue home Cymbalta    6.  Tobacco abuse  -smoking cessation counseling  -Nicotine patch    Dispo:  spoke to , Mr. Acosta at [phone] on plan of care, d/c home today with home PT.      Discharging Provider:  MITCHELL Vasquez  Contact Info: Cell [phone] - Please call with any questions or concerns.    Outpatient Provider:  Dr. Reyes notified of discharge           Hospital Course  74 y/o female with obesity, HLD, anxiety, agoraphobia, BIBEMS to the ED because of back pain, s/p sliding from toilet bowl.  Pt walks with a walker, has poor balance. Also noted to have increased leg swelling and redness.     1.  Back pain, s/p fall with Gait dysfunction  Hx of chronic pain secondary to RA  -PT eval noted; rec is for home with home PT  -continue pain mgmt; Cymbalta, Gabapentin, Tylenol prn, Ibuprofen prn, Morphine prn  -added Percocet on for prn for severe pain for discharge  -recommend outpatient Pain Mgmt Clinic    2.  Simple UTI  -culture + for Klebsiella, Raoutella  -oral Ceftin for D/C    3.  possible Cellulitis at LLE  chronic PVD  -on oral antibx    4.  Elevated TSH - 15.1  Serum T4: 5.7 --in normal range; thyroid gland probably with normal function    5.  Agoraphobia  -outpatient Behavioral Health f/u  -continue home Cymbalta    6.  Tobacco abuse  -smoking cessation counseling  -Nicotine patch    Dispo:  spoke to , Mr. Acosta at [phone] on plan of care, d/c home today with home PT.      Discharging Provider:  MITCHELL Vasquez  Contact Info: Cell [phone] - Please call with any questions or concerns.    Outpatient Provider:  Dr. Reyes notified of discharge    I, the attending physician, was physically present for the key portions of the evaluation and management (E/M) service provided. The total amount of time spent reviewing the hospital course, laboratory values, imaging findings, assessing/counseling the patient, discussing with consultant physicians, social work, nursing staff was 34 minutes.

## 2021-04-09 NOTE — DISCHARGE NOTE PROVIDER - NSDCCPCAREPLAN_GEN_ALL_CORE_FT
PRINCIPAL DISCHARGE DIAGNOSIS  Diagnosis: Falls  Assessment and Plan of Treatment:   -home Physical [facility]   -avoid Falls      SECONDARY DISCHARGE DIAGNOSES  Diagnosis: Acute UTI  Assessment and Plan of Treatment: -complete 3 days of antibiotics, make sure you hydrate well    Diagnosis: Rheumatoid arthritis  Assessment and Plan of Treatment: -continue home pain medications, can take Percocet 1 tab every 4 hours only for severe pain as needed  -follow up with Pain Management Program

## 2021-04-09 NOTE — PHARMACOTHERAPY INTERVENTION NOTE - COMMENTS
spoke to patient about medications. Pt confirms understanding and no issues with obtaining medication from pharmacy

## 2021-04-09 NOTE — PROGRESS NOTE ADULT - SUBJECTIVE AND OBJECTIVE BOX
Patient is a 75y old  Female who presents with a chief complaint of back pain, s/p fall, gait abn, leg swelling and redness (2021 08:59)    Patient seen and examined at bedside.  Pt. states her legs hurt, " I need my pain pills".    ALLERGIES:  epinephrine (Unknown)  norepinephrine (Unknown)    MEDICATIONS  (STANDING):  cefTRIAXone   IVPB 1000 milliGRAM(s) IV Intermittent every 24 hours  DULoxetine 20 milliGRAM(s) Oral two times a day  enoxaparin Injectable 40 milliGRAM(s) SubCutaneous daily  gabapentin 100 milliGRAM(s) Oral three times a day  multivitamin 1 Tablet(s) Oral daily  nicotine - 21 mG/24Hr(s) Patch 1 patch Transdermal daily  nystatin Powder 1 Application(s) Topical two times a day  simvastatin 10 milliGRAM(s) Oral at bedtime    MEDICATIONS  (PRN):  acetaminophen   Tablet .. 650 milliGRAM(s) Oral every 6 hours PRN Temp greater or equal to 38C (100.4F), Mild Pain (1 - 3)  ibuprofen  Tablet. 400 milliGRAM(s) Oral three times a day PRN Moderate Pain (4 - 6)  morphine  - Injectable 2 milliGRAM(s) IV Push every 6 hours PRN Severe Pain (7 - 10)  oxycodone    5 mG/acetaminophen 325 mG 1 Tablet(s) Oral every 4 hours PRN Moderate Pain (4 - 6)    Vital Signs Last 24 Hrs  T(F): 99.8 (2021 04:55), Max: 99.8 (2021 04:55)  HR: 87 (2021 04:55) (87 - 92)  BP: 118/71 (2021 04:55) (118/71 - 146/86)  RR: 18 (2021 04:55) (16 - 18)  SpO2: 92% (2021 04:55) (92% - 93%)  I&O's Summary    2021 07:01  -  2021 07:00  --------------------------------------------------------  IN: 0 mL / OUT: 600 mL / NET: -600 mL      PHYSICAL EXAM:  General: NAD, A/O x 2, confused about that she is in the Hospital.  ENT: MMM, no thrush  Neck: Supple, No JVD  Lungs: non-labored breathing, Clear to auscultation bilaterally, no w/r/r  Cardio: RRR, S1/S2, No murmurs  Abdomen: Soft, Nontender, Nondistended; Bowel sounds present  Extremities: No calf tenderness,  b/l erythema at lower exts L>R, mild warmth  Neuro: alert, nonfocal, follows commands    LABS:                        14.2   9.91  )-----------( 296      ( 2021 03:35 )             44.1     04-08    146  |  111  |  19  ----------------------------<  89  4.1   |  24  |  1.07    Ca    9.3      2021 07:00  Mg     2.1     04-07    TPro  6.6  /  Alb  3.2  /  TBili  0.2  /  DBili  x   /  AST  26  /  ALT  30  /  AlkPhos  81  04-07    eGFR if Non African American: 52 mL/min/1.73M2 (21 @ 07:00)  eGFR if African American: 60 mL/min/1.73M2 (21 @ 07:00)        CARDIAC MARKERS ( 2021 06:30 )  <.017 ng/mL / x     / 122 U/L / x     / x            TSH 15.10   TSH with FT4 reflex --  Total T3 --                  Urinalysis Basic - ( 2021 03:35 )    Color: Yellow / Appearance: Slightly Turbid / S.020 / pH: x  Gluc: x / Ketone: Negative  / Bili: Negative / Urobili: Negative   Blood: x / Protein: 100 / Nitrite: Positive   Leuk Esterase: Moderate / RBC: 11-25 /HPF / WBC >50 /HPF   Sq Epi: x / Non Sq Epi: Moderate / Bacteria: Many /HPF        Culture - Urine (collected 2021 03:35)  Source: .Urine Clean Catch (Midstream)  Final Report (2021 07:35):    >100,000 CFU/ml Klebsiella oxytoca/Raoutella ornithinolytica  Organism: Klebsiella oxytoca /Raoutella ornithinolytica (2021 07:35)  Organism: Klebsiella oxytoca /Raoutella ornithinolytica (2021 07:35)      -  Amikacin: S <=16      -  Amoxicillin/Clavulanic Acid: S <=8/4      -  Ampicillin: S <=8 These ampicillin results predict results for amoxicillin      -  Ampicillin/Sulbactam: S <=4/2 Enterobacter, Citrobacter, and Serratia may develop resistance during prolonged therapy (3-4 days)      -  Aztreonam: S <=4      -  Cefazolin: S <=2      -  Cefepime: S <=2      -  Cefoxitin: S <=8      -  Ceftriaxone: S <=1 Enterobacter, Citrobacter, and Serratia may develop resistance during prolonged therapy      -  Ciprofloxacin: S <=0.25      -  Ertapenem: S <=0.5      -  Gentamicin: S <=2      -  Imipenem: S <=1      -  Levofloxacin: S <=0.5      -  Meropenem: S <=1      -  Nitrofurantoin: S <=32 Should not be used to treat pyelonephritis      -  Piperacillin/Tazobactam: S <=8      -  Tigecycline: S <=2      -  Tobramycin: S <=2      -  Trimethoprim/Sulfamethoxazole: S <=0.5/9.5      Method Type: DARIO        RADIOLOGY & ADDITIONAL TESTS:    Care Discussed with Consultants/Other Providers:    Patient is a 75y old  Female who presents with a chief complaint of back pain, s/p fall, gait abn, leg swelling and redness (2021 08:59)    Patient seen and examined at bedside.  Pt. states her legs hurt, " I need my pain pills".    ALLERGIES:  epinephrine (Unknown)  norepinephrine (Unknown)    MEDICATIONS  (STANDING):  cefTRIAXone   IVPB 1000 milliGRAM(s) IV Intermittent every 24 hours  DULoxetine 20 milliGRAM(s) Oral two times a day  enoxaparin Injectable 40 milliGRAM(s) SubCutaneous daily  gabapentin 100 milliGRAM(s) Oral three times a day  multivitamin 1 Tablet(s) Oral daily  nicotine - 21 mG/24Hr(s) Patch 1 patch Transdermal daily  nystatin Powder 1 Application(s) Topical two times a day  simvastatin 10 milliGRAM(s) Oral at bedtime    MEDICATIONS  (PRN):  acetaminophen   Tablet .. 650 milliGRAM(s) Oral every 6 hours PRN Temp greater or equal to 38C (100.4F), Mild Pain (1 - 3)  ibuprofen  Tablet. 400 milliGRAM(s) Oral three times a day PRN Moderate Pain (4 - 6)  morphine  - Injectable 2 milliGRAM(s) IV Push every 6 hours PRN Severe Pain (7 - 10)  oxycodone    5 mG/acetaminophen 325 mG 1 Tablet(s) Oral every 4 hours PRN Moderate Pain (4 - 6)    Vital Signs Last 24 Hrs  T(F): 99.8 (2021 04:55), Max: 99.8 (2021 04:55)  HR: 87 (2021 04:55) (87 - 92)  BP: 118/71 (2021 04:55) (118/71 - 146/86)  RR: 18 (2021 04:55) (16 - 18)  SpO2: 92% (2021 04:55) (92% - 93%)  I&O's Summary    2021 07:01  -  2021 07:00  --------------------------------------------------------  IN: 0 mL / OUT: 600 mL / NET: -600 mL      PHYSICAL EXAM:  General: NAD, A/O x 2, confused about that she is in the Hospital.  ENT: MMM, no thrush  Neck: Supple, No JVD  Lungs: non-labored breathing, Clear to auscultation bilaterally, no w/r/r  Cardio: RRR, S1/S2, No murmurs  Abdomen: Soft, Nontender, Nondistended; Bowel sounds present  Extremities: No calf tenderness,  b/l erythema at lower exts L>R, mild warmth  Neuro: alert, nonfocal, follows commands    LABS:                        14.2   9.91  )-----------( 296      ( 2021 03:35 )             44.1     04-08    146  |  111  |  19  ----------------------------<  89  4.1   |  24  |  1.07    Ca    9.3      2021 07:00  Mg     2.1     04-07    TPro  6.6  /  Alb  3.2  /  TBili  0.2  /  DBili  x   /  AST  26  /  ALT  30  /  AlkPhos  81  04-07    eGFR if Non African American: 52 mL/min/1.73M2 (21 @ 07:00)  eGFR if African American: 60 mL/min/1.73M2 (21 @ 07:00)    CARDIAC MARKERS ( 2021 06:30 )  <.017 ng/mL / x     / 122 U/L / x     / x        TSH 15.10   TSH with FT4 reflex --  Total T3 --    Urinalysis Basic - ( 2021 03:35 )    Color: Yellow / Appearance: Slightly Turbid / S.020 / pH: x  Gluc: x / Ketone: Negative  / Bili: Negative / Urobili: Negative   Blood: x / Protein: 100 / Nitrite: Positive   Leuk Esterase: Moderate / RBC: 11-25 /HPF / WBC >50 /HPF   Sq Epi: x / Non Sq Epi: Moderate / Bacteria: Many /HPF    Culture - Urine (collected 2021 03:35)  Source: .Urine Clean Catch (Midstream)  Final Report (2021 07:35):    >100,000 CFU/ml Klebsiella oxytoca/Raoutella ornithinolytica  Organism: Klebsiella oxytoca /Raoutella ornithinolytica (2021 07:35)  Organism: Klebsiella oxytoca /Raoutella ornithinolytica (2021 07:35)      -  Amikacin: S <=16      -  Amoxicillin/Clavulanic Acid: S <=8/4      -  Ampicillin: S <=8 These ampicillin results predict results for amoxicillin      -  Ampicillin/Sulbactam: S <=4/2 Enterobacter, Citrobacter, and Serratia may develop resistance during prolonged therapy (3-4 days)      -  Aztreonam: S <=4      -  Cefazolin: S <=2      -  Cefepime: S <=2      -  Cefoxitin: S <=8      -  Ceftriaxone: S <=1 Enterobacter, Citrobacter, and Serratia may develop resistance during prolonged therapy      -  Ciprofloxacin: S <=0.25      -  Ertapenem: S <=0.5      -  Gentamicin: S <=2      -  Imipenem: S <=1      -  Levofloxacin: S <=0.5      -  Meropenem: S <=1      -  Nitrofurantoin: S <=32 Should not be used to treat pyelonephritis      -  Piperacillin/Tazobactam: S <=8      -  Tigecycline: S <=2      -  Tobramycin: S <=2      -  Trimethoprim/Sulfamethoxazole: S <=0.5/9.5      Method Type: DARIO    RADIOLOGY & ADDITIONAL TESTS:    Care Discussed with Consultants/Other Providers:

## 2021-04-09 NOTE — PROGRESS NOTE ADULT - REASON FOR ADMISSION
back pain, s/p fall, gait abn, leg swelling and redness
back pain, s/p fall, gait abn, leg swelling and redness

## 2021-04-09 NOTE — PROGRESS NOTE ADULT - ATTENDING COMMENTS
I have personally seen and examined patient on the above date. I discussed the case with MITCHELL Rondon and I agree with the findings and plan as detailed per note above, which I have amended where appropriate.  76 yo F with hx of obesity, anxiety, agoraphobia admitted for poor balance, fall; found to have uti. PT emma noted, home PT. Transition to oral abx with outpt follow up with PCP, Rheum, Pain management. Plan discussed with patient's . Discharge this afternoon.

## 2021-04-09 NOTE — PROGRESS NOTE ADULT - ASSESSMENT
74 y/o female with obesity, HLD, anxiety, agoraphobia, BIBEMS to the ED because of back pain, s/p sliding from toilet bowl.  Pt walks with a walker, has poor balance. Also noted to have increased leg swelling and redness.     Back pain, s/p fall  Gait dysfunction  Hx of chronic pain secondary to RA  -PT eval noted; rec is for home with home PT  -continue pain mgmt; Cymbalta, Gabapentin, Tylenol prn, Ibuprofen prn, Morphine prn  -will add on Percocet prn for severe pain for discharge  -recommend outpatient Pain Mgmt Clinic    Simple UTI  -culture + for Klebsiella, Raoutella  -sensitive to Ceftriaxone, will change to oral Ceftin for D/C    possible Cellulitis at LLE  chronic PVD  -on oral antibx    Agoraphobia  -outpatient Behavioral Health f/u  -continue home Cymbalta    Tobacco abuse  -smoking cessation counseling  -Nicotine patch    DVT prophylaxis - lovenox    Dispo:  spoke to , Mr. Acosta at [phone] on plan of care, d/c home today with home PT.   74 y/o female with obesity, HLD, anxiety, agoraphobia, BIBEMS to the ED because of back pain, s/p sliding from toilet bowl.  Pt walks with a walker, has poor balance. Also noted to have increased leg swelling and redness.     Back pain, s/p fall  Gait dysfunction  Hx of chronic pain secondary to RA  -PT eval noted; rec is for home with home PT  -continue pain mgmt; Cymbalta, Gabapentin, Tylenol prn, Ibuprofen prn, Morphine prn  -will add on Percocet prn for severe pain for discharge  -recommend outpatient Pain Mgmt Clinic    Simple UTI  -culture + for Klebsiella, Raoutella  -sensitive to Ceftriaxone, will change to oral Ceftin for D/C    possible Cellulitis at LLE  chronic PVD  -on oral antibx    Elevated TSH - 15.1  Serum T4: 5.7 --in normal range; thyroid gland probably with normal function    Agoraphobia  -outpatient Behavioral Health f/u  -continue home Cymbalta    Tobacco abuse  -smoking cessation counseling  -Nicotine patch    DVT prophylaxis - lovenox    Dispo:  spoke to , Mr. Acosta at [phone] on plan of care, d/c home today with home PT.

## 2021-04-09 NOTE — DISCHARGE NOTE PROVIDER - NSDCHHENCOUNTER_GEN_ALL_CORE
Physical Therapy Daily Treatment   Discharge addendum    Visit Count: 9/10  Plan of Care Dates: Initial: 9/25/2017 Through: 12/18/2017     Insurance Information: physical and speech therapy combined cap of $1980/$3700 per calendar year, $0 used at the time of evaluation  Next Referring Provider Visit: 2 weeks for BP check, having a CT of shoulder and US     Referred by: Ramsey Rodriguez MD  Medical Diagnosis (from order):  Chronic right-sided low back pain with right-sided sciatica [M54.41, G89.29]   Chronic right shoulder pain [M25.511, G89.29]   Treatment Diagnosis: Lumbar Symptoms with Pain, Impaired Posture, Impaired Range of Motion, Radiating Pain and Impaired Mobility, Shoulder symptoms with the same  Insurance: 1. MEDICARE  2. ANTHEM/BCBS     Date of onset/injury: see below     Diagnosis Precautions: none  Chart reviewed: Relevant co-morbidities, allergies, tests and medications: HTN, a-fib   Lumbar x-ray:  There is 2 mm degenerative  retrolisthesis of L3 relative to L4. There is multilevel degenerative disc  disease which is most advanced at L3-4 where overall it is mild-moderately  advanced with similar though less advanced changes present at L4-5. There  is posterior element hypertrophy in the low lumbar spine.    SUBJECTIVE   Feeling really good after last session.  Radicular pain is much less.    Current Pain: 1/10.    Functional Change: walking limited, to 1 block.   Description of onset: Description of Problem/Mechanism of Injury: Since 19 yo had pain, had a football injury to right shoulder, but feels the low back is a result of this.  Aggravated about 4 years ago if did hiking, especially hilly.  No can not walk more than a couple blocks before starts to get pain.  Went to [facility] and they wanted to do surgery for a disc.  A week before surgery miraculously no pain.  OBJECTIVE   Posture/Observation:  Significant posterior pelvic, decreased spinal curves throughout,   Right greater than left ER in  LE's  Right LE with decreased weight bearing.    left rotation  Right pelvic shift  Forward flexion seated, left rotation Lumbar spine and pain at L5-S1, L4-5 worst.   Gait Analysis:  Not assessed     Range of Motion (%)  [] All motions within functional/normal limits except those noted.   [x] Only those motions that were assessed are noted.  [] Uninvolved extremity motion within functional/normal limits.     Initial   Lumbar Flexion 26 cm fingers to floor, pain in right lb and hip   Lumbar Extension Minimal to no reversal of curve, *   Lumbar Lateral Flexion Left 50% pull to right   Lumbar Lateral Flexion Right  50% pinch on right   Lumbar Rotation Left      Lumbar Rotation Right      standard testing positions unless otherwise noted; Key: ranges are reported in active range of motion unless noted as AA=active assistive or P=passive range of motion, * denotes pain   Comments: minimal lumbar spine motion with testing above.             Muscle Flexibility:  Gluteals - Left: minimal restriction, Right: minimal restriction  Hamstring - Left: moderate restriction, Right: moderate restriction  Hip External Rotators - Left: moderate restriction, Right: moderate restriction  Hip Internal Rotators - Left: within normal limits, Right: within normal limits  Iliopsoas - Left: minimal restriction, Right: minimal restriction        Palpation:  Tender at piriformis, glut medius, sacral bases, lumbar paraspinals, facets           Functional Tests:  Sit To Stand: uses UE's minimally  sitting:  needed to move after 1 min  Sleep interupted        Outcome Measures:   Oswestry: OSWESTRY Score Calculated: 28 %   (0-20% = minimal disability; 20-40% = moderate disability; 40-60% = severe disability; 60-80% = crippled; % = bed bound)        SUBJECTIVE  FOR shoulder    Description of Problem/Mechanism of Injury: again when 18 injured in football.  Has gone to chiropractors, PT, acupuncture over the years.  It would help.  In the last  3 months sleeping and working out has become more difficult.    Pain:  Intensity: Now: 3-4/10; Best: 0/10; Worst: 9/10 (in the last 2 weeks)  Location: right shoulder anterior and upper trap and into neck  Quality/Description: Throbbing, Stiff, constant tingling in forearm extensors.    Relieving/Alleviating factors: stretching some \"manipulation\" (cracking neck)      Function:  Limitations and exacerbating factors (patient reported): pain, difficulty with throwing, reaching overhead, reaching behind back, sitting for no greater than 1 hour.   Prior level (patient reported): low grade pain for years, flare up recently    Prior Treatment: outpatient physical therapy, chiropractic services, massage services and acupuncture in the past year for current condition. Hospitalization, home health services or skilled nursing facility in the last 30 days: No, per patient.    Patient Goals/Concerns:  Reduce pain to manageable level, be able to sit greater than 1 hour without medications    OBJECTIVE   Hand Dominance: right    Posture/Observation:  Significant posterior pelvic, decreased spinal curves throughout,   Right greater than left ER in LE's  Right LE with decreased weight bearing.    left rotation  Right pelvic shift  Forward flexion seated, left rotation Lumbar spine and pain at L5-S1, L4-5 worst.   Flat thoracic spine  cerivcal spine right rotated  Shoulders rounded  UE's flexed and IR    Range of Motion (degrees)  [] All motions within functional/normal limits except those noted.   [x] Only those motions that were assessed are noted.  [] Uninvolved extremity motion within functional/normal limits.   Norm Left Right   Shoulder                    Date  Initial Initial   Flexion 170-180 145 130* ache   Extension 50-60     Abduction 170-180 130 120*   Adduction 50-75     Internal Rotation   45 33   External  Rotation 80-90 85 96* at end range   standard testing positions unless otherwise noted; Key: ranges are  reported in active range of motion unless noted as AA=active assistive or P=passive range of motion, * denotes pain   Comments:     Scapular Assessment   Left Right   Resting Position elevated, abduction elevated, abduction   Scapulohumeral Rhythm early/excessive upward rotation, poor eccentric control early/excessive upward rotation, poor eccentric control   Comments: right worse than left; * denotes pain     Strength (out of 5)  [] Gross muscle strength within functional/normal limits except as noted.  [x] Only muscle strength that was assessed are noted.  [] Uninvolved muscle strength within functional/normal limits.   Left Right   Date Initial Initial   Shoulder Flexion     Shoulder Extension     Shoulder Abduction     Shoulder Adduction     Shoulder Internal Rotation     Shoulder External Rotation     Elbow Flexion     Elbow Extension     standard testing positions unless otherwise noted,* denotes pain  Comments:       Muscle Flexibility:  Table to posterior acromium 2.5 inches bilaterally          Joint Play Assessment:  Decreased joint mobility bilaterally      Functional Tests:  Functional UE Range of Motion:      Left  Right Involved   Date Initial Initial Current   Place hand on opposite shoulder Yes Yes    Touch top of head Yes Yes    Place hand behind neck Yes Yes *    Place hand behind back Yes thumb to inf angle left scap Yes *thumb to right T 12    Over head reach Yes Yes limited ROM    Comments:     Treatment   Therapeutic Exercise:   Treadmill - 0 incline, 2.0 miles per hour, 4 minutes, sidestepping x 3 min each way  .8 mph.    Could feel some symptoms into the leg, when asked to activate core, pt could decrease the symptoms.   Prone on elbows for \"bulging L4\" per pt.  Explained the theory of greater pressure to less pressure with spinal extension.    Neuromuscular re-education  Abdominal brace with holds 5-10 seconds, did not progress due to abdominal aneurysm.   Side lying glut medius x 10  reps    Manual Therapy:   Pelvis grossly neutral this date  STM with TPR R TFL, glut med, piriformis x 25min, again, significant improvement in low back and LE pain afterwards.    Current Home Program (not performed this date except as noted above):   Exercise: Date issued Date DC Comments   Piriformis stretch ER/ABD * supine and sitting  piriformis stretch IR/ADD * eval     Seated hip adduction ball squeeze   seated hip ER with orange theraband  R posterior innominate self MET correction  Foam roll piriformis, ITB 10/5/17     4-pt arch and sag  4=pt abdominal brace 10/10/17     Scapular retraction in sitting  Foam roll T, I, W 10/11/17     4-pt rocks/with and w/o sidebending  1/2 kneel hip flexor stretch with rotation 10/16/16         ASSESSMENT   The pt really is feeling better, the soft tissue work has significantly reduced his pain and his radicular symptoms are better. He is faithful with his HEP and will continue with this until he has surgery for his heart and abdominal aneurysm.  He will be in touch with his plans and recovery.  He will continue to benefit from PT to address his radicular and shoulder symptoms for function after surgery.    Pain after treatment: 0-1/10   Result of above outlined education: Verbalizes understanding and Needs reinforcement    Goals:       To be obtained by end of this plan of care:  1. Patient independent with modified and progressed home exercise program.  2. Patient will report decreased lumbar pain/symptoms to 4/10 to aid in completing self-care tasks/dressing. Achieved 10/20/17  3. Patient will increase lumbar active range of motion to within functional limits to aid in completing household tasks.     4. Patient will demonstrate proper body mechanics for sitting and standing. Achieved to best of ability with posterior pelvic tilt posture 10/30/17  5. Patient will decrease radicular symptoms by 90 % to aid in sleeping undisturbed through a night. Progressing very well  10/30/17  6. Patient will be able to tolerate sitting activities for greater than or equal to 60 minutes with minimal pain/difficulty. Progressing very well 10/30/17  7. Patient will ambulate 30 min or greater  to aid in overall health and MD appt attendance. Achieved 10/30/17  8. oswestry from 28 to 15  9.   Shoulder ROM to increase to 140 degrees shoulder flexion, 130 degrees shoulder abduction to allow improved overhead activities.    10.  Decrease radicular symptoms by 75% to allow improved functional UE use.     Goal status:  Progressing.          PLAN   Pt holding PT until has surgery for heart and abdominal aneurysm.   He is progressing very very well and will continue with his HEP as he is able.      Did pt schedule MRI?  Assess shoulder strength next session  Scapular stability/strength  Shoulder joint mobilizations  Hip mobility  Begin lower abdominal strengthening- focus on abdominal brace   Assess estim  Ball spinal mobility   Reassess pelvis-ongoing  Review foam roll  Pelvic stabilization program- see above  Assess tolerance to traction, consider lower level of force if radiculopathy continues    THERAPY DAILY BILLING   Primary Insurance:  MEDICARE  Secondary Insurance: ANTH/BC    Evaluation Procedures:  No evaluation codes were used on this date of service    Timed Procedures:  Manual Therapy, 25 minutes  Neuromuscular Re-Education, 12 minutes  Therapeutic Exercise, 16 minutes    Untimed Procedures:  No untimed codes were used on this date of service     Total Treatment Time: 53 minutes        G-Code:  G-Code Score ABN form  reporting not required this treatment session  Modifier based on outcome measure(s)/functional testing/clinical judgement as listed above    The referring provider's electronic or written signature on the evaluation authorizes the therapy plan of care and certifies the need for these services, furnished under this plan of care while under their care.  Physician Signature on  09-Apr-2021 file.   Discharge Summary Addendum:  Date: 2/13/2018  Total Number of Visits: 9    Please see above for last known status.  Status of goals: based on last known status anticipate goals partially met

## 2021-04-09 NOTE — DISCHARGE NOTE PROVIDER - CARE PROVIDER_API CALL
Reyes, John A (MD)  Internal Medicine  [address], [address]  [place] [address]  Phone: [phone]  Fax: [phone]  Follow Up Time:

## 2021-04-10 LAB — HCV RNA FLD QL NAA+PROBE: SIGNIFICANT CHANGE UP

## 2021-04-13 ENCOUNTER — APPOINTMENT (OUTPATIENT)
Dept: INTERNAL MEDICINE | Facility: CLINIC | Age: 76
End: 2021-04-13
Payer: MEDICARE

## 2021-04-13 VITALS
BODY MASS INDEX: 29.45 KG/M2 | HEIGHT: 60 IN | HEART RATE: 66 BPM | TEMPERATURE: 98 F | DIASTOLIC BLOOD PRESSURE: 70 MMHG | OXYGEN SATURATION: 98 % | SYSTOLIC BLOOD PRESSURE: 120 MMHG | RESPIRATION RATE: 16 BRPM | WEIGHT: 150 LBS

## 2021-04-13 PROCEDURE — 99496 TRANSJ CARE MGMT HIGH F2F 7D: CPT

## 2021-04-13 NOTE — HISTORY OF PRESENT ILLNESS
[Post-hospitalization from ___ Hospital] : Post-hospitalization from [unfilled] Hospital [Admitted on: ___] : The patient was admitted on [unfilled] [Discharged on ___] : discharged on [unfilled] [Discharge Summary] : discharge summary [Pertinent Labs] : pertinent labs [Radiology Findings] : radiology findings [Discharge Med List] : discharge medication list [Med Reconciliation] : medication reconciliation has been completed [Patient Contacted By: ____] : and contacted by [unfilled] [[email or web address]] : Hospital Course: \par Discharge Date	09-Apr-2021 \par Admission Date	07-Apr-2021 04:21 \par Reason for Admission	back pain, s/p fall, gait abn, leg swelling and redness \par Hospital Course	 \par Hospital Course \par 74 y/o female with obesity, HLD, anxiety, agoraphobia, BIBEMS to the ED because \par of back pain, s/p sliding from toilet bowl.  Pt walks with a walker, has poor \par balance. Also noted to have increased leg swelling and redness. \par \par 1.  Back pain, s/p fall with Gait dysfunction \par Hx of chronic pain secondary to RA \par -PT eval noted; rec is for home with home PT \par -continue pain mgmt; Cymbalta, Gabapentin, Tylenol prn, Ibuprofen prn, Morphine \par prn \par -added Percocet on for prn for severe pain for discharge \par -recommend outpatient Pain Mgmt Clinic \par \par 2.  Simple UTI \par -culture + for Klebsiella, Raoutella \par -oral Ceftin for D/C \par \par 3.  possible Cellulitis at LLE \par chronic PVD \par -on oral antibx \par \par 4.  Elevated TSH - 15.1 \par Serum T4: 5.7 --in normal range; thyroid gland probably with normal function \par \par 5.  Agoraphobia \par -outpatient Behavioral Health f/u \par -continue home Cymbalta \par \par 6.  Tobacco abuse \par -smoking cessation counseling \par -Nicotine patch \par \par \par \par Pt  brings her in has advanced internal medicine that uses an aide for 30 hrs a week\par She is completely immobile requires more help pt has NP not prescribing medications so suggested she go to pain clinic\par Pt fell prior to appointment\par pt was in hospital and is on percocet now\par \par Pt needs more than one person to get in and out of WC\par

## 2021-04-14 ENCOUNTER — EMERGENCY (EMERGENCY)
Facility: HOSPITAL | Age: 76
LOS: 1 days | Discharge: HOME | End: 2021-04-14
Attending: EMERGENCY MEDICINE | Admitting: EMERGENCY MEDICINE
Payer: MEDICARE

## 2021-04-14 VITALS
HEIGHT: 61 IN | TEMPERATURE: 98 F | SYSTOLIC BLOOD PRESSURE: 139 MMHG | OXYGEN SATURATION: 98 % | WEIGHT: 179.9 LBS | RESPIRATION RATE: 15 BRPM | DIASTOLIC BLOOD PRESSURE: 81 MMHG | HEART RATE: 99 BPM

## 2021-04-14 VITALS
SYSTOLIC BLOOD PRESSURE: 133 MMHG | HEART RATE: 84 BPM | OXYGEN SATURATION: 96 % | RESPIRATION RATE: 15 BRPM | DIASTOLIC BLOOD PRESSURE: 82 MMHG

## 2021-04-14 PROCEDURE — 72125 CT NECK SPINE W/O DYE: CPT

## 2021-04-14 PROCEDURE — 71250 CT THORAX DX C-: CPT

## 2021-04-14 PROCEDURE — 72125 CT NECK SPINE W/O DYE: CPT | Mod: 26,MD

## 2021-04-14 PROCEDURE — 71250 CT THORAX DX C-: CPT | Mod: 26,MA

## 2021-04-14 PROCEDURE — 73552 X-RAY EXAM OF FEMUR 2/>: CPT

## 2021-04-14 PROCEDURE — 73552 X-RAY EXAM OF FEMUR 2/>: CPT | Mod: 26,LT

## 2021-04-14 PROCEDURE — 99284 EMERGENCY DEPT VISIT MOD MDM: CPT | Mod: 25

## 2021-04-14 PROCEDURE — 90715 TDAP VACCINE 7 YRS/> IM: CPT

## 2021-04-14 PROCEDURE — 70450 CT HEAD/BRAIN W/O DYE: CPT

## 2021-04-14 PROCEDURE — 73030 X-RAY EXAM OF SHOULDER: CPT

## 2021-04-14 PROCEDURE — 74176 CT ABD & PELVIS W/O CONTRAST: CPT

## 2021-04-14 PROCEDURE — 74176 CT ABD & PELVIS W/O CONTRAST: CPT | Mod: 26,MA

## 2021-04-14 PROCEDURE — 99284 EMERGENCY DEPT VISIT MOD MDM: CPT

## 2021-04-14 PROCEDURE — 70450 CT HEAD/BRAIN W/O DYE: CPT | Mod: 26,MA

## 2021-04-14 PROCEDURE — 90471 IMMUNIZATION ADMIN: CPT

## 2021-04-14 PROCEDURE — 73030 X-RAY EXAM OF SHOULDER: CPT | Mod: 26,LT

## 2021-04-14 RX ORDER — TETANUS TOXOID, REDUCED DIPHTHERIA TOXOID AND ACELLULAR PERTUSSIS VACCINE, ADSORBED 5; 2.5; 8; 8; 2.5 [IU]/.5ML; [IU]/.5ML; UG/.5ML; UG/.5ML; UG/.5ML
0.5 SUSPENSION INTRAMUSCULAR ONCE
Refills: 0 | Status: COMPLETED | OUTPATIENT
Start: 2021-04-14 | End: 2021-04-14

## 2021-04-14 RX ORDER — LIDOCAINE 4 G/100G
2 CREAM TOPICAL ONCE
Refills: 0 | Status: COMPLETED | OUTPATIENT
Start: 2021-04-14 | End: 2021-04-14

## 2021-04-14 RX ORDER — OXYCODONE AND ACETAMINOPHEN 5; 325 MG/1; MG/1
1 TABLET ORAL ONCE
Refills: 0 | Status: DISCONTINUED | OUTPATIENT
Start: 2021-04-14 | End: 2021-04-14

## 2021-04-14 RX ORDER — LIDOCAINE 4 G/100G
1 CREAM TOPICAL ONCE
Refills: 0 | Status: DISCONTINUED | OUTPATIENT
Start: 2021-04-14 | End: 2021-04-14

## 2021-04-14 RX ADMIN — LIDOCAINE 2 PATCH: 4 CREAM TOPICAL at 13:51

## 2021-04-14 RX ADMIN — TETANUS TOXOID, REDUCED DIPHTHERIA TOXOID AND ACELLULAR PERTUSSIS VACCINE, ADSORBED 0.5 MILLILITER(S): 5; 2.5; 8; 8; 2.5 SUSPENSION INTRAMUSCULAR at 15:25

## 2021-04-14 RX ADMIN — OXYCODONE AND ACETAMINOPHEN 1 TABLET(S): 5; 325 TABLET ORAL at 18:24

## 2021-04-14 RX ADMIN — OXYCODONE AND ACETAMINOPHEN 1 TABLET(S): 5; 325 TABLET ORAL at 19:24

## 2021-04-14 RX ADMIN — LIDOCAINE 2 PATCH: 4 CREAM TOPICAL at 19:25

## 2021-04-14 NOTE — ED PROVIDER NOTE - ATTENDING CONTRIBUTION TO CARE
Dr. Brewster: I performed a face to face bedside interview with patient regarding history of present illness, review of symptoms and past medical history. I completed an independent physical exam.  I have discussed patient's plan of care with PA.   I agree with note as stated above, having amended the EMR as needed to reflect my findings.   This includes HISTORY OF PRESENT ILLNESS, HIV, PAST MEDICAL/SURGICAL/FAMILY/SOCIAL HISTORY, ALLERGIES AND HOME MEDICATIONS, REVIEW OF SYSTEMS, PHYSICAL EXAM, and any PROGRESS NOTES during the time I functioned as the attending physician for this patient.    see mdm

## 2021-04-14 NOTE — ED PROVIDER NOTE - PATIENT PORTAL LINK FT
You can access the FollowMyHealth Patient Portal offered by [facility] by registering at the following website: [email or web address]. By joining [facility]’s FollowMyHealth portal, you will also be able to view your health information using other applications (apps) compatible with our system.

## 2021-04-14 NOTE — ED PROVIDER NOTE - CLINICAL SUMMARY MEDICAL DECISION MAKING FREE TEXT BOX
Dr. Brewster: 75F h/o unsteady gait (recent admission, accepted to [facility]), HLD, p/w face injury and back pain s/p mechanical fall on her way to [facility]. No LOC, no prolonged down time. No lightheadedness or dizziness. On exam pt is chronically ill appearing, +small abrasion over bridge of nose, no max fac ttp, RRR, CTAB, abdo soft/nt/nd, +healing ecchymosis diffusely on back and right flank, pelvis stable, no midline spinal ttp, no pain with ROM of bilateral hips. Will give tdap, imaging, if neg dc to [facility] as per Dr. Reyes.

## 2021-04-14 NOTE — ED ADULT NURSE REASSESSMENT NOTE - NS ED NURSE REASSESS COMMENT FT1
spoke with wong from [facility] and updated her on pt plan of care and told wong pt to be arriving at [facility] by transport. pt awaiting transport to [facility] at this time. pt resting comfortably in stretcher with no complaints, will continue to monitor.

## 2021-04-14 NOTE — ED ADULT NURSE NOTE - NSIMPLEMENTINTERV_GEN_ALL_ED
Implemented All Fall Risk Interventions:  [place] to call system. Call bell, personal items and telephone within reach. Instruct patient to call for assistance. Room bathroom lighting operational. Non-slip footwear when patient is off stretcher. Physically safe environment: no spills, clutter or unnecessary equipment. Stretcher in lowest position, wheels locked, appropriate side rails in place. Provide visual cue, wrist band, yellow gown, etc. Monitor gait and stability. Monitor for mental status changes and reorient to person, place, and time. Review medications for side effects contributing to fall risk. Reinforce activity limits and safety measures with patient and family.

## 2021-04-14 NOTE — ED PROVIDER NOTE - OBJECTIVE STATEMENT
76 y/o female with obesity, HLD, anxiety, agoraphobia, BIBEMS to the ED because of back pain, s/p sliding from couch trying to hold her walker.  Pt walks with a walker, has poor balance. [facility] and she slipped. discussed with Dr. Reyes and after ed eval pt can be transferred to [facility]. has chronic back pain and leg pain

## 2021-04-14 NOTE — ED ADULT NURSE NOTE - OBJECTIVE STATEMENT
pt BIBEMS to the ED for c/o back pain, s/p sliding from couch trying to hold her walker.  Pt walks with a walker, has poor balance. pt was on her way to [facility] for admission for gait abnormality and she slipped. MD araiza discussed with Dr. Reyes and after ed eval pt can be transferred right to [facility]. has chronic back pain and leg pain. pt presents with bruising to back from prior fall as per pt. pt with PMH of obesity, HTN, HLD, anxiety, agoraphobia. pt BIBEMS to the ED for c/o back pain, s/p sliding from couch trying to hold her walker.  Pt walks with a walker, has poor balance. pt was on her way to [facility] for admission for gait abnormality and she slipped. pt presents with small facial abrasion from her eyeglasses from fall. MD araiza discussed with Dr. Reyes and after ed eval pt can be transferred right to [facility]. has chronic back pain and leg pain. pt presents with bruising to back from prior fall as per pt. pt with PMH of obesity, HTN, HLD, anxiety, agoraphobia.

## 2021-04-14 NOTE — ED ADULT NURSE NOTE - PMH
Agoraphobia  severe as per  - has not left house since 2016 (is being seen by NP at home)  Anxiety    Chronic back pain    High cholesterol    Hypertension    PTSD (post-traumatic stress disorder)

## 2021-06-14 PROBLEM — M54.9 DORSALGIA, UNSPECIFIED: Chronic | Status: ACTIVE | Noted: 2021-04-14

## 2021-06-17 ENCOUNTER — INPATIENT (INPATIENT)
Facility: HOSPITAL | Age: 76
LOS: 5 days | Discharge: HOME | DRG: 690 | End: 2021-06-23
Attending: HOSPITALIST | Admitting: INTERNAL MEDICINE
Payer: MEDICARE

## 2021-06-17 VITALS
WEIGHT: 149.91 LBS | SYSTOLIC BLOOD PRESSURE: 138 MMHG | TEMPERATURE: 97 F | HEIGHT: 61 IN | DIASTOLIC BLOOD PRESSURE: 83 MMHG | RESPIRATION RATE: 18 BRPM | HEART RATE: 84 BPM | OXYGEN SATURATION: 97 %

## 2021-06-17 LAB
ALBUMIN SERPL ELPH-MCNC: 4 G/DL — SIGNIFICANT CHANGE UP (ref 3.3–5)
ALP SERPL-CCNC: 71 U/L — SIGNIFICANT CHANGE UP (ref 40–120)
ALT FLD-CCNC: 59 U/L — HIGH (ref 10–45)
ANION GAP SERPL CALC-SCNC: 10 MMOL/L — SIGNIFICANT CHANGE UP (ref 5–17)
APPEARANCE UR: ABNORMAL
APTT BLD: 29.1 SEC — SIGNIFICANT CHANGE UP (ref 27.5–35.5)
AST SERPL-CCNC: 44 U/L — HIGH (ref 10–40)
BACTERIA # UR AUTO: ABNORMAL /HPF
BASOPHILS # BLD AUTO: 0.04 K/UL — SIGNIFICANT CHANGE UP (ref 0–0.2)
BASOPHILS NFR BLD AUTO: 0.2 % — SIGNIFICANT CHANGE UP (ref 0–2)
BILIRUB SERPL-MCNC: 0.8 MG/DL — SIGNIFICANT CHANGE UP (ref 0.2–1.2)
BILIRUB UR-MCNC: NEGATIVE — SIGNIFICANT CHANGE UP
BUN SERPL-MCNC: 27 MG/DL — HIGH (ref 7–23)
CALCIUM SERPL-MCNC: 9.8 MG/DL — SIGNIFICANT CHANGE UP (ref 8.4–10.5)
CHLORIDE SERPL-SCNC: 105 MMOL/L — SIGNIFICANT CHANGE UP (ref 96–108)
CO2 SERPL-SCNC: 28 MMOL/L — SIGNIFICANT CHANGE UP (ref 22–31)
COLOR SPEC: YELLOW — SIGNIFICANT CHANGE UP
CREAT SERPL-MCNC: 1.14 MG/DL — SIGNIFICANT CHANGE UP (ref 0.5–1.3)
DIFF PNL FLD: ABNORMAL
EOSINOPHIL # BLD AUTO: 0.03 K/UL — SIGNIFICANT CHANGE UP (ref 0–0.5)
EOSINOPHIL NFR BLD AUTO: 0.2 % — SIGNIFICANT CHANGE UP (ref 0–6)
EPI CELLS # UR: ABNORMAL
GLUCOSE SERPL-MCNC: 88 MG/DL — SIGNIFICANT CHANGE UP (ref 70–99)
GLUCOSE UR QL: NEGATIVE — SIGNIFICANT CHANGE UP
HCT VFR BLD CALC: 42 % — SIGNIFICANT CHANGE UP (ref 34.5–45)
HGB BLD-MCNC: 14.2 G/DL — SIGNIFICANT CHANGE UP (ref 11.5–15.5)
IMM GRANULOCYTES NFR BLD AUTO: 0.7 % — SIGNIFICANT CHANGE UP (ref 0–1.5)
INR BLD: 1.11 RATIO — SIGNIFICANT CHANGE UP (ref 0.88–1.16)
KETONES UR-MCNC: NEGATIVE — SIGNIFICANT CHANGE UP
LEUKOCYTE ESTERASE UR-ACNC: ABNORMAL
LYMPHOCYTES # BLD AUTO: 17.7 % — SIGNIFICANT CHANGE UP (ref 13–44)
LYMPHOCYTES # BLD AUTO: 3.06 K/UL — SIGNIFICANT CHANGE UP (ref 1–3.3)
MCHC RBC-ENTMCNC: 29.5 PG — SIGNIFICANT CHANGE UP (ref 27–34)
MCHC RBC-ENTMCNC: 33.8 GM/DL — SIGNIFICANT CHANGE UP (ref 32–36)
MCV RBC AUTO: 87.3 FL — SIGNIFICANT CHANGE UP (ref 80–100)
MONOCYTES # BLD AUTO: 1.19 K/UL — HIGH (ref 0–0.9)
MONOCYTES NFR BLD AUTO: 6.9 % — SIGNIFICANT CHANGE UP (ref 2–14)
NEUTROPHILS # BLD AUTO: 12.8 K/UL — HIGH (ref 1.8–7.4)
NEUTROPHILS NFR BLD AUTO: 74.3 % — SIGNIFICANT CHANGE UP (ref 43–77)
NITRITE UR-MCNC: NEGATIVE — SIGNIFICANT CHANGE UP
NRBC # BLD: 0 /100 WBCS — SIGNIFICANT CHANGE UP (ref 0–0)
PH UR: 6 — SIGNIFICANT CHANGE UP (ref 5–8)
PLATELET # BLD AUTO: 273 K/UL — SIGNIFICANT CHANGE UP (ref 150–400)
POTASSIUM SERPL-MCNC: 3.2 MMOL/L — LOW (ref 3.5–5.3)
POTASSIUM SERPL-SCNC: 3.2 MMOL/L — LOW (ref 3.5–5.3)
PROT SERPL-MCNC: 7.8 G/DL — SIGNIFICANT CHANGE UP (ref 6–8.3)
PROT UR-MCNC: 30 MG/DL
PROTHROM AB SERPL-ACNC: 13.4 SEC — SIGNIFICANT CHANGE UP (ref 10.6–13.6)
RBC # BLD: 4.81 M/UL — SIGNIFICANT CHANGE UP (ref 3.8–5.2)
RBC # FLD: 14.2 % — SIGNIFICANT CHANGE UP (ref 10.3–14.5)
RBC CASTS # UR COMP ASSIST: ABNORMAL /HPF (ref 0–4)
SODIUM SERPL-SCNC: 143 MMOL/L — SIGNIFICANT CHANGE UP (ref 135–145)
SP GR SPEC: 1.02 — SIGNIFICANT CHANGE UP (ref 1.01–1.02)
UROBILINOGEN FLD QL: NEGATIVE — SIGNIFICANT CHANGE UP
WBC # BLD: 17.24 K/UL — HIGH (ref 3.8–10.5)
WBC # FLD AUTO: 17.24 K/UL — HIGH (ref 3.8–10.5)
WBC UR QL: >50 /HPF (ref 0–5)

## 2021-06-17 PROCEDURE — 99285 EMERGENCY DEPT VISIT HI MDM: CPT

## 2021-06-17 PROCEDURE — 71045 X-RAY EXAM CHEST 1 VIEW: CPT | Mod: 26

## 2021-06-17 RX ORDER — POTASSIUM CHLORIDE 20 MEQ
40 PACKET (EA) ORAL ONCE
Refills: 0 | Status: COMPLETED | OUTPATIENT
Start: 2021-06-17 | End: 2021-06-17

## 2021-06-17 RX ORDER — SODIUM CHLORIDE 9 MG/ML
2100 INJECTION INTRAMUSCULAR; INTRAVENOUS; SUBCUTANEOUS ONCE
Refills: 0 | Status: COMPLETED | OUTPATIENT
Start: 2021-06-17 | End: 2021-06-17

## 2021-06-17 RX ADMIN — SODIUM CHLORIDE 2100 MILLILITER(S): 9 INJECTION INTRAMUSCULAR; INTRAVENOUS; SUBCUTANEOUS at 23:11

## 2021-06-17 NOTE — ED PROVIDER NOTE - CLINICAL SUMMARY MEDICAL DECISION MAKING FREE TEXT BOX
pt is elderly female was BIB ems with poor po intake fro a week, unkempt , smell urine , had + UTI , pt was admitted

## 2021-06-17 NOTE — ED ADULT NURSE NOTE - CAS TRG GENERAL NORM CIRC DET
CERTIFICATE OF RETURN TO WORK    November 29, 2017      Re:     Camilo Reddy  [address] [id number]                        This is to certify that Camilo Reddy has been under my care from 11/14/17 and can return to WORK on 11/30/17.    RESTRICTIONS: No lifting greater than 40 pounds for another 2 weeks. Limited pushing and pulling. No restrictions after 12/12/17. Please call with any questions or concerns.        SIGNATURE:___________________________________________,   11/29/2017          Jozef Romo, [address] [address] [phone]       Strong peripheral pulses

## 2021-06-17 NOTE — ED ADULT TRIAGE NOTE - CHIEF COMPLAINT QUOTE
Pt recently discharged to home after fall.  Pt family called 911 stating "pt has not eaten in 7 days".  Pt reports she fell today, c./o pain her tailbone.  Pt BIB EMS for failure to thrive.  EMS reports pt found soiled and has bedsores.

## 2021-06-17 NOTE — ED ADULT NURSE NOTE - OBJECTIVE STATEMENT
75 yr old female A&Ox4 BIBA s/p fall.  Pt lives at home with  and daughter. Reports  called EMS s/p fall. Pt arrives to ED soiled and unkept.  Pt denies any pain.  No acute resp distress noted. Resp even and unlabored. Abd soft, NT.  +redness noted to sacrum and buttocks. Skin warm, and dry. 20 G IV placed to LT AC. Bloods obtained and sent, Safety maintained.

## 2021-06-17 NOTE — ED PROVIDER NOTE - PHYSICAL EXAMINATION
General:     NAD, well-nourished, well-appearing, unkempt  elderly female , smells urine   Head:     NC/AT, EOMI, oral mucosa moist  Neck:     supple  Lungs:     CTA b/l, no w/r/r  CVS:     S1S2, RRR, no m/g/r  Abd:     +BS, s/nt/nd, no organomegaly  Ext:    2+ radial and pedal pulses, no c/c/e  Neuro: grossly intact

## 2021-06-18 DIAGNOSIS — N39.0 URINARY TRACT INFECTION, SITE NOT SPECIFIED: ICD-10-CM

## 2021-06-18 LAB
ALBUMIN SERPL ELPH-MCNC: 2.8 G/DL — LOW (ref 3.3–5)
ALP SERPL-CCNC: 50 U/L — SIGNIFICANT CHANGE UP (ref 40–120)
ALT FLD-CCNC: 40 U/L — SIGNIFICANT CHANGE UP (ref 10–45)
ANION GAP SERPL CALC-SCNC: 9 MMOL/L — SIGNIFICANT CHANGE UP (ref 5–17)
AST SERPL-CCNC: 33 U/L — SIGNIFICANT CHANGE UP (ref 10–40)
BASOPHILS # BLD AUTO: 0.03 K/UL — SIGNIFICANT CHANGE UP (ref 0–0.2)
BASOPHILS NFR BLD AUTO: 0.3 % — SIGNIFICANT CHANGE UP (ref 0–2)
BILIRUB SERPL-MCNC: 0.6 MG/DL — SIGNIFICANT CHANGE UP (ref 0.2–1.2)
BUN SERPL-MCNC: 18 MG/DL — SIGNIFICANT CHANGE UP (ref 7–23)
CALCIUM SERPL-MCNC: 8.8 MG/DL — SIGNIFICANT CHANGE UP (ref 8.4–10.5)
CHLORIDE SERPL-SCNC: 113 MMOL/L — HIGH (ref 96–108)
CO2 SERPL-SCNC: 24 MMOL/L — SIGNIFICANT CHANGE UP (ref 22–31)
CREAT SERPL-MCNC: 0.86 MG/DL — SIGNIFICANT CHANGE UP (ref 0.5–1.3)
EOSINOPHIL # BLD AUTO: 0.14 K/UL — SIGNIFICANT CHANGE UP (ref 0–0.5)
EOSINOPHIL NFR BLD AUTO: 1.3 % — SIGNIFICANT CHANGE UP (ref 0–6)
GLUCOSE SERPL-MCNC: 86 MG/DL — SIGNIFICANT CHANGE UP (ref 70–99)
HCT VFR BLD CALC: 37.9 % — SIGNIFICANT CHANGE UP (ref 34.5–45)
HGB BLD-MCNC: 12.5 G/DL — SIGNIFICANT CHANGE UP (ref 11.5–15.5)
IMM GRANULOCYTES NFR BLD AUTO: 0.4 % — SIGNIFICANT CHANGE UP (ref 0–1.5)
LACTATE SERPL-SCNC: 1.3 MMOL/L — SIGNIFICANT CHANGE UP (ref 0.7–2)
LACTATE SERPL-SCNC: 2.3 MMOL/L — HIGH (ref 0.7–2)
LYMPHOCYTES # BLD AUTO: 2.82 K/UL — SIGNIFICANT CHANGE UP (ref 1–3.3)
LYMPHOCYTES # BLD AUTO: 26.4 % — SIGNIFICANT CHANGE UP (ref 13–44)
MAGNESIUM SERPL-MCNC: 2 MG/DL — SIGNIFICANT CHANGE UP (ref 1.6–2.6)
MCHC RBC-ENTMCNC: 29.2 PG — SIGNIFICANT CHANGE UP (ref 27–34)
MCHC RBC-ENTMCNC: 33 GM/DL — SIGNIFICANT CHANGE UP (ref 32–36)
MCV RBC AUTO: 88.6 FL — SIGNIFICANT CHANGE UP (ref 80–100)
MONOCYTES # BLD AUTO: 0.7 K/UL — SIGNIFICANT CHANGE UP (ref 0–0.9)
MONOCYTES NFR BLD AUTO: 6.5 % — SIGNIFICANT CHANGE UP (ref 2–14)
NEUTROPHILS # BLD AUTO: 6.97 K/UL — SIGNIFICANT CHANGE UP (ref 1.8–7.4)
NEUTROPHILS NFR BLD AUTO: 65.1 % — SIGNIFICANT CHANGE UP (ref 43–77)
NRBC # BLD: 0 /100 WBCS — SIGNIFICANT CHANGE UP (ref 0–0)
PLATELET # BLD AUTO: 193 K/UL — SIGNIFICANT CHANGE UP (ref 150–400)
POTASSIUM SERPL-MCNC: 3.3 MMOL/L — LOW (ref 3.5–5.3)
POTASSIUM SERPL-SCNC: 3.3 MMOL/L — LOW (ref 3.5–5.3)
PROT SERPL-MCNC: 5.9 G/DL — LOW (ref 6–8.3)
RBC # BLD: 4.28 M/UL — SIGNIFICANT CHANGE UP (ref 3.8–5.2)
RBC # FLD: 14 % — SIGNIFICANT CHANGE UP (ref 10.3–14.5)
SARS-COV-2 RNA SPEC QL NAA+PROBE: SIGNIFICANT CHANGE UP
SODIUM SERPL-SCNC: 146 MMOL/L — HIGH (ref 135–145)
TSH SERPL-MCNC: 1.17 UIU/ML — SIGNIFICANT CHANGE UP (ref 0.27–4.2)
WBC # BLD: 10.7 K/UL — HIGH (ref 3.8–10.5)
WBC # FLD AUTO: 10.7 K/UL — HIGH (ref 3.8–10.5)

## 2021-06-18 PROCEDURE — 99223 1ST HOSP IP/OBS HIGH 75: CPT

## 2021-06-18 RX ORDER — ALPRAZOLAM 0.25 MG
0.25 TABLET ORAL AT BEDTIME
Refills: 0 | Status: DISCONTINUED | OUTPATIENT
Start: 2021-06-18 | End: 2021-06-18

## 2021-06-18 RX ORDER — NICOTINE POLACRILEX 2 MG
1 GUM BUCCAL DAILY
Refills: 0 | Status: DISCONTINUED | OUTPATIENT
Start: 2021-06-18 | End: 2021-06-23

## 2021-06-18 RX ORDER — LEVOTHYROXINE SODIUM 125 MCG
75 TABLET ORAL DAILY
Refills: 0 | Status: DISCONTINUED | OUTPATIENT
Start: 2021-06-18 | End: 2021-06-23

## 2021-06-18 RX ORDER — ENOXAPARIN SODIUM 100 MG/ML
40 INJECTION SUBCUTANEOUS DAILY
Refills: 0 | Status: DISCONTINUED | OUTPATIENT
Start: 2021-06-18 | End: 2021-06-23

## 2021-06-18 RX ORDER — SIMVASTATIN 20 MG/1
10 TABLET, FILM COATED ORAL AT BEDTIME
Refills: 0 | Status: DISCONTINUED | OUTPATIENT
Start: 2021-06-18 | End: 2021-06-23

## 2021-06-18 RX ORDER — CEFTRIAXONE 500 MG/1
1000 INJECTION, POWDER, FOR SOLUTION INTRAMUSCULAR; INTRAVENOUS EVERY 24 HOURS
Refills: 0 | Status: DISCONTINUED | OUTPATIENT
Start: 2021-06-18 | End: 2021-06-21

## 2021-06-18 RX ORDER — ACETAMINOPHEN 500 MG
650 TABLET ORAL EVERY 6 HOURS
Refills: 0 | Status: DISCONTINUED | OUTPATIENT
Start: 2021-06-18 | End: 2021-06-23

## 2021-06-18 RX ORDER — SODIUM CHLORIDE 9 MG/ML
1000 INJECTION, SOLUTION INTRAVENOUS
Refills: 0 | Status: COMPLETED | OUTPATIENT
Start: 2021-06-18 | End: 2021-06-18

## 2021-06-18 RX ORDER — OXYCODONE AND ACETAMINOPHEN 5; 325 MG/1; MG/1
1 TABLET ORAL EVERY 6 HOURS
Refills: 0 | Status: DISCONTINUED | OUTPATIENT
Start: 2021-06-18 | End: 2021-06-23

## 2021-06-18 RX ORDER — ALPRAZOLAM 0.25 MG
0.25 TABLET ORAL THREE TIMES A DAY
Refills: 0 | Status: DISCONTINUED | OUTPATIENT
Start: 2021-06-18 | End: 2021-06-18

## 2021-06-18 RX ORDER — GABAPENTIN 400 MG/1
100 CAPSULE ORAL THREE TIMES A DAY
Refills: 0 | Status: DISCONTINUED | OUTPATIENT
Start: 2021-06-18 | End: 2021-06-23

## 2021-06-18 RX ORDER — ALPRAZOLAM 0.25 MG
0.25 TABLET ORAL EVERY 12 HOURS
Refills: 0 | Status: DISCONTINUED | OUTPATIENT
Start: 2021-06-18 | End: 2021-06-23

## 2021-06-18 RX ORDER — POTASSIUM CHLORIDE 20 MEQ
40 PACKET (EA) ORAL EVERY 4 HOURS
Refills: 0 | Status: COMPLETED | OUTPATIENT
Start: 2021-06-18 | End: 2021-06-18

## 2021-06-18 RX ORDER — DULOXETINE HYDROCHLORIDE 30 MG/1
20 CAPSULE, DELAYED RELEASE ORAL DAILY
Refills: 0 | Status: DISCONTINUED | OUTPATIENT
Start: 2021-06-18 | End: 2021-06-23

## 2021-06-18 RX ADMIN — Medication 40 MILLIEQUIVALENT(S): at 12:05

## 2021-06-18 RX ADMIN — GABAPENTIN 100 MILLIGRAM(S): 400 CAPSULE ORAL at 20:37

## 2021-06-18 RX ADMIN — OXYCODONE AND ACETAMINOPHEN 1 TABLET(S): 5; 325 TABLET ORAL at 21:30

## 2021-06-18 RX ADMIN — SODIUM CHLORIDE 60 MILLILITER(S): 9 INJECTION, SOLUTION INTRAVENOUS at 02:30

## 2021-06-18 RX ADMIN — DULOXETINE HYDROCHLORIDE 20 MILLIGRAM(S): 30 CAPSULE, DELAYED RELEASE ORAL at 12:05

## 2021-06-18 RX ADMIN — Medication 75 MICROGRAM(S): at 06:00

## 2021-06-18 RX ADMIN — CEFTRIAXONE 100 MILLIGRAM(S): 500 INJECTION, POWDER, FOR SOLUTION INTRAMUSCULAR; INTRAVENOUS at 08:30

## 2021-06-18 RX ADMIN — GABAPENTIN 100 MILLIGRAM(S): 400 CAPSULE ORAL at 13:29

## 2021-06-18 RX ADMIN — SODIUM CHLORIDE 2100 MILLILITER(S): 9 INJECTION INTRAMUSCULAR; INTRAVENOUS; SUBCUTANEOUS at 00:11

## 2021-06-18 RX ADMIN — Medication 40 MILLIEQUIVALENT(S): at 00:40

## 2021-06-18 RX ADMIN — SIMVASTATIN 10 MILLIGRAM(S): 20 TABLET, FILM COATED ORAL at 20:37

## 2021-06-18 RX ADMIN — Medication 40 MILLIEQUIVALENT(S): at 13:29

## 2021-06-18 RX ADMIN — ENOXAPARIN SODIUM 40 MILLIGRAM(S): 100 INJECTION SUBCUTANEOUS at 12:04

## 2021-06-18 RX ADMIN — GABAPENTIN 100 MILLIGRAM(S): 400 CAPSULE ORAL at 06:03

## 2021-06-18 RX ADMIN — OXYCODONE AND ACETAMINOPHEN 1 TABLET(S): 5; 325 TABLET ORAL at 20:38

## 2021-06-18 NOTE — H&P ADULT - NSICDXPASTMEDICALHX_GEN_ALL_CORE_FT
PAST MEDICAL HISTORY:  Agoraphobia severe as per  - has not left house since 2016 (is being seen by NP at home)    Anxiety     Chronic back pain     High cholesterol     Hypertension     PTSD (post-traumatic stress disorder)

## 2021-06-18 NOTE — PHYSICAL THERAPY INITIAL EVALUATION ADULT - FUNCTIONAL LIMITATIONS, PT EVAL
Is This A New Presentation, Or A Follow-Up?: Skin Lesion
Have Your Skin Lesions Been Treated?: not been treated
self-care/home management/community/leisure

## 2021-06-18 NOTE — H&P ADULT - ASSESSMENT
IMPROVE VTE Individual Risk Assessment  RISK                                                                Points  [  ] Previous VTE                                                  3  [  ] Thrombophilia                                               2  [  ] Lower limb paralysis                                      2        (unable to hold up >15 seconds)    [  ] Current Cancer                                              2         (within 6 months)  [  ] Immobilization > 24 hrs                                1  [  ] ICU/CCU stay > 24 hours                              1  [ x ] Age > 60                                                      1  IMPROVE VTE Score _________  IMPROVE Score 0-1: Low Risk, No VTE prophylaxis required for most patients, encourage ambulation.   IMPROVE Score 2-3: At risk, pharmacologic VTE prophylaxis is indicated for most patients (in the absence of a contraindication)  IMPROVE Score > or = 4: High Risk, pharmacologic VTE prophylaxis is indicated for most patients (in the absence of a contraindication)   UTI  IMPROVE VTE Individual Risk Assessment  RISK                                                                Points  [  ] Previous VTE                                                  3  [  ] Thrombophilia                                               2  [  ] Lower limb paralysis                                      2        (unable to hold up >15 seconds)    [  ] Current Cancer                                              2         (within 6 months)  [  ] Immobilization > 24 hrs                                1  [  ] ICU/CCU stay > 24 hours                              1  [ x ] Age > 60                                                      1  IMPROVE VTE Score _________  IMPROVE Score 0-1: Low Risk, No VTE prophylaxis required for most patients, encourage ambulation.   IMPROVE Score 2-3: At risk, pharmacologic VTE prophylaxis is indicated for most patients (in the absence of a contraindication)  IMPROVE Score > or = 4: High Risk, pharmacologic VTE prophylaxis is indicated for most patients (in the absence of a contraindication) 76 y/o female with  hypothyroid, HLD, neuropathy, impaired balance and gait, walks with a walker, anxiety/depression, apparently was just d/anjum from [facility] 2 days ago, but as per family, pt has not been eating.  Pt states she also slid from couch, as she tried to get up but states she feels fatigued and weak.   Pt denies fever, chills, abd pain, nausea, vomiting.  Pt noted to have elev WBC, and sl elev lactate.   Lactate improved s/p fluid bolus.    UTI  Hypokalemia  Weakness, impaired gait, decreased po intake    Admit  Empiric Ceftriaxone pending cultures  IV hydration, replete K  FFup labs  Cont current meds: Synthroid, Cymbalta, Simvastatin, Xanax prn  DVT prophylaxis  PT eval  Social work referral    IMPROVE VTE Individual Risk Assessment  RISK                                                                Points  [  ] Previous VTE                                                  3  [  ] Thrombophilia                                               2  [  ] Lower limb paralysis                                      2        (unable to hold up >15 seconds)    [  ] Current Cancer                                              2         (within 6 months)  [  ] Immobilization > 24 hrs                                1  [  ] ICU/CCU stay > 24 hours                              1  [ x ] Age > 60                                                      1  IMPROVE VTE Score _________  IMPROVE Score 0-1: Low Risk, No VTE prophylaxis required for most patients, encourage ambulation.   IMPROVE Score 2-3: At risk, pharmacologic VTE prophylaxis is indicated for most patients (in the absence of a contraindication)  IMPROVE Score > or = 4: High Risk, pharmacologic VTE prophylaxis is indicated for most patients (in the absence of a contraindication) 76 y/o female with  hypothyroid, HLD, neuropathy, impaired balance and gait, walks with a walker, anxiety/depression, apparently was just d/anjum from [facility] 2 days ago, but as per family, pt has not been eating.  Pt states she also slid from couch, as she tried to get up but states she feels fatigued and weak.   Pt denies fever, chills, abd pain, nausea, vomiting.  Pt noted to have elev WBC, and sl elev lactate.   Lactate improved s/p fluid bolus.    UTI  Hypokalemia  Weakness, impaired gait, decreased po intake    Admit  Empiric Ceftriaxone pending cultures  IV hydration, replete K  FFup labs  Cont current meds: Synthroid, Cymbalta, Simvastatin, Xanax prn  smoking cessation - counseled, nicotine patch  PT eval  Social work referral  DVT prophylaxis    IMPROVE VTE Individual Risk Assessment  RISK                                                                Points  [  ] Previous VTE                                                  3  [  ] Thrombophilia                                               2  [  ] Lower limb paralysis                                      2        (unable to hold up >15 seconds)    [  ] Current Cancer                                              2         (within 6 months)  [  ] Immobilization > 24 hrs                                1  [  ] ICU/CCU stay > 24 hours                              1  [ x ] Age > 60                                                      1  IMPROVE VTE Score _________  IMPROVE Score 0-1: Low Risk, No VTE prophylaxis required for most patients, encourage ambulation.   IMPROVE Score 2-3: At risk, pharmacologic VTE prophylaxis is indicated for most patients (in the absence of a contraindication)  IMPROVE Score > or = 4: High Risk, pharmacologic VTE prophylaxis is indicated for most patients (in the absence of a contraindication)

## 2021-06-18 NOTE — H&P ADULT - HISTORY OF PRESENT ILLNESS
74 y/o female with anxiety/depression, hypothyroid, neuropathy, impaired balance and gait, walks with a walker, apparently was just d/anjum from [facility] 2 days ago, but as per family, pt has not been eating.  Pt states she also slid from couch, as she tried to get up but states she feels fatigued and weak.   Pt denies fever, chills, abd pain, nausea, vomiting. 74 y/o female with hypothyroid, HLD, neuropathy, impaired balance and gait, walks with a walker, anxiety/depression, apparently was just d/anjum from [facility] 2 days ago, but as per family, pt has not been eating.  Pt states she also slid from couch, as she tried to get up but states she feels fatigued and weak.   Pt denies fever, chills, abd pain, nausea, vomiting.  Pt noted to have elev WBC, and sl elev lactate.   Lactate improved s/p fluid bolus.

## 2021-06-18 NOTE — PHYSICAL THERAPY INITIAL EVALUATION ADULT - RANGE OF MOTION EXAMINATION, REHAB EVAL
except shoulder 25% of normal AROM 2/2 weakness and tightness/bilateral upper extremity ROM was WFL (within functional limits)/bilateral lower extremity ROM was WFL (within functional limits)

## 2021-06-18 NOTE — H&P ADULT - NSHPPHYSICALEXAM_GEN_ALL_CORE
Vital Signs (24 Hrs):  T(C): 36.3 (06-17-21 @ 22:08), Max: 36.3 (06-17-21 @ 22:08)  HR: 80 (06-17-21 @ 23:00) (80 - 84)  BP: 138/55 (06-17-21 @ 23:00) (138/55 - 138/83)  RR: 18 (06-17-21 @ 23:00) (18 - 18)  SpO2: 97% (06-17-21 @ 23:00) (97% - 97%)  Daily Height in cm: 154.94 (17 Jun 2021 22:08)

## 2021-06-18 NOTE — PATIENT PROFILE ADULT - DO YOU FEEL THREATENED BY OTHERS?
Case Management Discharge Note    Final Note: Social work spoke with the patient about discharge planning needs. She stated she does not have any discharge needs identified at this time.  She will be discharged to her home today.    Destination      No service has been selected for the patient.      Durable Medical Equipment      No service has been selected for the patient.      Dialysis/Infusion      No service has been selected for the patient.      Home Medical Care      No service has been selected for the patient.      Therapy      No service has been selected for the patient.      Community Resources      No service has been selected for the patient.             Final Discharge Disposition Code: 01 - home or self-care   no

## 2021-06-19 LAB
ALBUMIN SERPL ELPH-MCNC: 3 G/DL — LOW (ref 3.3–5)
ALP SERPL-CCNC: 52 U/L — SIGNIFICANT CHANGE UP (ref 40–120)
ALT FLD-CCNC: 41 U/L — SIGNIFICANT CHANGE UP (ref 10–45)
ANION GAP SERPL CALC-SCNC: 10 MMOL/L — SIGNIFICANT CHANGE UP (ref 5–17)
AST SERPL-CCNC: 30 U/L — SIGNIFICANT CHANGE UP (ref 10–40)
BASOPHILS # BLD AUTO: 0.05 K/UL — SIGNIFICANT CHANGE UP (ref 0–0.2)
BASOPHILS NFR BLD AUTO: 0.5 % — SIGNIFICANT CHANGE UP (ref 0–2)
BILIRUB SERPL-MCNC: 0.4 MG/DL — SIGNIFICANT CHANGE UP (ref 0.2–1.2)
BUN SERPL-MCNC: 14 MG/DL — SIGNIFICANT CHANGE UP (ref 7–23)
CALCIUM SERPL-MCNC: 9.2 MG/DL — SIGNIFICANT CHANGE UP (ref 8.4–10.5)
CHLORIDE SERPL-SCNC: 109 MMOL/L — HIGH (ref 96–108)
CO2 SERPL-SCNC: 25 MMOL/L — SIGNIFICANT CHANGE UP (ref 22–31)
COVID-19 SPIKE DOMAIN AB INTERP: POSITIVE
COVID-19 SPIKE DOMAIN ANTIBODY RESULT: >250 U/ML — HIGH
CREAT SERPL-MCNC: 0.85 MG/DL — SIGNIFICANT CHANGE UP (ref 0.5–1.3)
CULTURE RESULTS: NO GROWTH — SIGNIFICANT CHANGE UP
EOSINOPHIL # BLD AUTO: 0.23 K/UL — SIGNIFICANT CHANGE UP (ref 0–0.5)
EOSINOPHIL NFR BLD AUTO: 2.4 % — SIGNIFICANT CHANGE UP (ref 0–6)
GLUCOSE SERPL-MCNC: 86 MG/DL — SIGNIFICANT CHANGE UP (ref 70–99)
HCT VFR BLD CALC: 37.5 % — SIGNIFICANT CHANGE UP (ref 34.5–45)
HGB BLD-MCNC: 12.5 G/DL — SIGNIFICANT CHANGE UP (ref 11.5–15.5)
IMM GRANULOCYTES NFR BLD AUTO: 0.5 % — SIGNIFICANT CHANGE UP (ref 0–1.5)
LYMPHOCYTES # BLD AUTO: 3.12 K/UL — SIGNIFICANT CHANGE UP (ref 1–3.3)
LYMPHOCYTES # BLD AUTO: 33.2 % — SIGNIFICANT CHANGE UP (ref 13–44)
MCHC RBC-ENTMCNC: 29.3 PG — SIGNIFICANT CHANGE UP (ref 27–34)
MCHC RBC-ENTMCNC: 33.3 GM/DL — SIGNIFICANT CHANGE UP (ref 32–36)
MCV RBC AUTO: 87.8 FL — SIGNIFICANT CHANGE UP (ref 80–100)
MONOCYTES # BLD AUTO: 0.67 K/UL — SIGNIFICANT CHANGE UP (ref 0–0.9)
MONOCYTES NFR BLD AUTO: 7.1 % — SIGNIFICANT CHANGE UP (ref 2–14)
NEUTROPHILS # BLD AUTO: 5.27 K/UL — SIGNIFICANT CHANGE UP (ref 1.8–7.4)
NEUTROPHILS NFR BLD AUTO: 56.3 % — SIGNIFICANT CHANGE UP (ref 43–77)
NRBC # BLD: 0 /100 WBCS — SIGNIFICANT CHANGE UP (ref 0–0)
PLATELET # BLD AUTO: 189 K/UL — SIGNIFICANT CHANGE UP (ref 150–400)
POTASSIUM SERPL-MCNC: 3.4 MMOL/L — LOW (ref 3.5–5.3)
POTASSIUM SERPL-SCNC: 3.4 MMOL/L — LOW (ref 3.5–5.3)
PROT SERPL-MCNC: 6.1 G/DL — SIGNIFICANT CHANGE UP (ref 6–8.3)
RBC # BLD: 4.27 M/UL — SIGNIFICANT CHANGE UP (ref 3.8–5.2)
RBC # FLD: 14 % — SIGNIFICANT CHANGE UP (ref 10.3–14.5)
SARS-COV-2 IGG+IGM SERPL QL IA: >250 U/ML — HIGH
SARS-COV-2 IGG+IGM SERPL QL IA: POSITIVE
SODIUM SERPL-SCNC: 144 MMOL/L — SIGNIFICANT CHANGE UP (ref 135–145)
SPECIMEN SOURCE: SIGNIFICANT CHANGE UP
WBC # BLD: 9.39 K/UL — SIGNIFICANT CHANGE UP (ref 3.8–10.5)
WBC # FLD AUTO: 9.39 K/UL — SIGNIFICANT CHANGE UP (ref 3.8–10.5)

## 2021-06-19 PROCEDURE — 99233 SBSQ HOSP IP/OBS HIGH 50: CPT

## 2021-06-19 RX ORDER — POTASSIUM CHLORIDE 20 MEQ
40 PACKET (EA) ORAL EVERY 4 HOURS
Refills: 0 | Status: COMPLETED | OUTPATIENT
Start: 2021-06-19 | End: 2021-06-19

## 2021-06-19 RX ADMIN — SIMVASTATIN 10 MILLIGRAM(S): 20 TABLET, FILM COATED ORAL at 22:10

## 2021-06-19 RX ADMIN — OXYCODONE AND ACETAMINOPHEN 1 TABLET(S): 5; 325 TABLET ORAL at 10:45

## 2021-06-19 RX ADMIN — DULOXETINE HYDROCHLORIDE 20 MILLIGRAM(S): 30 CAPSULE, DELAYED RELEASE ORAL at 13:55

## 2021-06-19 RX ADMIN — ENOXAPARIN SODIUM 40 MILLIGRAM(S): 100 INJECTION SUBCUTANEOUS at 13:55

## 2021-06-19 RX ADMIN — Medication 40 MILLIEQUIVALENT(S): at 13:55

## 2021-06-19 RX ADMIN — GABAPENTIN 100 MILLIGRAM(S): 400 CAPSULE ORAL at 13:55

## 2021-06-19 RX ADMIN — OXYCODONE AND ACETAMINOPHEN 1 TABLET(S): 5; 325 TABLET ORAL at 16:50

## 2021-06-19 RX ADMIN — CEFTRIAXONE 100 MILLIGRAM(S): 500 INJECTION, POWDER, FOR SOLUTION INTRAMUSCULAR; INTRAVENOUS at 10:34

## 2021-06-19 RX ADMIN — Medication 75 MICROGRAM(S): at 05:57

## 2021-06-19 RX ADMIN — GABAPENTIN 100 MILLIGRAM(S): 400 CAPSULE ORAL at 22:10

## 2021-06-19 RX ADMIN — OXYCODONE AND ACETAMINOPHEN 1 TABLET(S): 5; 325 TABLET ORAL at 16:21

## 2021-06-19 RX ADMIN — OXYCODONE AND ACETAMINOPHEN 1 TABLET(S): 5; 325 TABLET ORAL at 11:15

## 2021-06-19 RX ADMIN — Medication 40 MILLIEQUIVALENT(S): at 10:34

## 2021-06-19 RX ADMIN — GABAPENTIN 100 MILLIGRAM(S): 400 CAPSULE ORAL at 05:57

## 2021-06-19 NOTE — PROGRESS NOTE ADULT - ASSESSMENT
76 y/o female with  hypothyroid, HLD, neuropathy, impaired balance and gait, walks with a walker, anxiety/depression, apparently was just [facility] from [facility], but as per family, pt has not been eating.  Pt states she also slid from couch, as she tried to get up but states she feels fatigued and weak.   Pt denies fever, chills, abd pain, nausea, vomiting.  Pt noted to have elev WBC, and sl elev lactate.   Lactate improved s/p fluid bolus.    UTI  - UA with pyuria  - ceftriaxone  - blood cultures, urine cultures NGTD    Dementia  - may be worsening  - Pt is awake however not alert to current situation    Hypokalemia  - replete PO  - monitor BMP    Hypothyroidism  - Synthroid 75 mcg    HLD  - simvastatin 10    Mood disorder  - Cymbalta, Xanax prn    Tobacco dependence  - smoking cessation - counseled, nicotine patch    DVT prophylaxis    Will call  today 76 y/o female with  hypothyroid, HLD, neuropathy, impaired balance and gait, walks with a walker, anxiety/depression, apparently was just d/anjum from [facility] 2 days ago, but as per family, pt has not been eating.  Pt states she also slid from couch, as she tried to get up but states she feels fatigued and weak.   Pt denies fever, chills, abd pain, nausea, vomiting.  Pt noted to have elev WBC, and sl elev lactate.   Lactate improved s/p fluid bolus.    UTI  - UA with pyuria  - ceftriaxone  - blood cultures, urine cultures NGTD    Dementia  - may be worsening  - Pt is awake however not alert to current situation    Hypokalemia  - replete PO  - monitor BMP    Hypothyroidism  - Synthroid 75 mcg    HLD  - simvastatin 10    Mood disorder  - Cymbalta, Xanax prn    Tobacco dependence  - smoking cessation - counseled, nicotine patch    DVT prophylaxis    Left message on VM for  Dylan 6/19

## 2021-06-20 LAB
ALBUMIN SERPL ELPH-MCNC: 3.2 G/DL — LOW (ref 3.3–5)
ALP SERPL-CCNC: 67 U/L — SIGNIFICANT CHANGE UP (ref 40–120)
ALT FLD-CCNC: 40 U/L — SIGNIFICANT CHANGE UP (ref 10–45)
ANION GAP SERPL CALC-SCNC: 9 MMOL/L — SIGNIFICANT CHANGE UP (ref 5–17)
AST SERPL-CCNC: 35 U/L — SIGNIFICANT CHANGE UP (ref 10–40)
BASOPHILS # BLD AUTO: 0.06 K/UL — SIGNIFICANT CHANGE UP (ref 0–0.2)
BASOPHILS NFR BLD AUTO: 0.7 % — SIGNIFICANT CHANGE UP (ref 0–2)
BILIRUB SERPL-MCNC: 0.5 MG/DL — SIGNIFICANT CHANGE UP (ref 0.2–1.2)
BUN SERPL-MCNC: 9 MG/DL — SIGNIFICANT CHANGE UP (ref 7–23)
CALCIUM SERPL-MCNC: 8.9 MG/DL — SIGNIFICANT CHANGE UP (ref 8.4–10.5)
CHLORIDE SERPL-SCNC: 108 MMOL/L — SIGNIFICANT CHANGE UP (ref 96–108)
CO2 SERPL-SCNC: 24 MMOL/L — SIGNIFICANT CHANGE UP (ref 22–31)
CREAT SERPL-MCNC: 0.88 MG/DL — SIGNIFICANT CHANGE UP (ref 0.5–1.3)
EOSINOPHIL # BLD AUTO: 0.28 K/UL — SIGNIFICANT CHANGE UP (ref 0–0.5)
EOSINOPHIL NFR BLD AUTO: 3.2 % — SIGNIFICANT CHANGE UP (ref 0–6)
GLUCOSE SERPL-MCNC: 95 MG/DL — SIGNIFICANT CHANGE UP (ref 70–99)
HCT VFR BLD CALC: 42.2 % — SIGNIFICANT CHANGE UP (ref 34.5–45)
HGB BLD-MCNC: 13.8 G/DL — SIGNIFICANT CHANGE UP (ref 11.5–15.5)
IMM GRANULOCYTES NFR BLD AUTO: 0.5 % — SIGNIFICANT CHANGE UP (ref 0–1.5)
LYMPHOCYTES # BLD AUTO: 2.74 K/UL — SIGNIFICANT CHANGE UP (ref 1–3.3)
LYMPHOCYTES # BLD AUTO: 31.4 % — SIGNIFICANT CHANGE UP (ref 13–44)
MCHC RBC-ENTMCNC: 28.8 PG — SIGNIFICANT CHANGE UP (ref 27–34)
MCHC RBC-ENTMCNC: 32.7 GM/DL — SIGNIFICANT CHANGE UP (ref 32–36)
MCV RBC AUTO: 87.9 FL — SIGNIFICANT CHANGE UP (ref 80–100)
MONOCYTES # BLD AUTO: 0.63 K/UL — SIGNIFICANT CHANGE UP (ref 0–0.9)
MONOCYTES NFR BLD AUTO: 7.2 % — SIGNIFICANT CHANGE UP (ref 2–14)
NEUTROPHILS # BLD AUTO: 4.99 K/UL — SIGNIFICANT CHANGE UP (ref 1.8–7.4)
NEUTROPHILS NFR BLD AUTO: 57 % — SIGNIFICANT CHANGE UP (ref 43–77)
NRBC # BLD: 0 /100 WBCS — SIGNIFICANT CHANGE UP (ref 0–0)
PLATELET # BLD AUTO: 206 K/UL — SIGNIFICANT CHANGE UP (ref 150–400)
POTASSIUM SERPL-MCNC: 5.1 MMOL/L — SIGNIFICANT CHANGE UP (ref 3.5–5.3)
POTASSIUM SERPL-SCNC: 5.1 MMOL/L — SIGNIFICANT CHANGE UP (ref 3.5–5.3)
PROT SERPL-MCNC: 6.6 G/DL — SIGNIFICANT CHANGE UP (ref 6–8.3)
RBC # BLD: 4.8 M/UL — SIGNIFICANT CHANGE UP (ref 3.8–5.2)
RBC # FLD: 13.9 % — SIGNIFICANT CHANGE UP (ref 10.3–14.5)
SODIUM SERPL-SCNC: 141 MMOL/L — SIGNIFICANT CHANGE UP (ref 135–145)
WBC # BLD: 8.74 K/UL — SIGNIFICANT CHANGE UP (ref 3.8–10.5)
WBC # FLD AUTO: 8.74 K/UL — SIGNIFICANT CHANGE UP (ref 3.8–10.5)

## 2021-06-20 PROCEDURE — 99232 SBSQ HOSP IP/OBS MODERATE 35: CPT

## 2021-06-20 RX ADMIN — Medication 1 PATCH: at 18:28

## 2021-06-20 RX ADMIN — Medication 650 MILLIGRAM(S): at 10:00

## 2021-06-20 RX ADMIN — SIMVASTATIN 10 MILLIGRAM(S): 20 TABLET, FILM COATED ORAL at 22:33

## 2021-06-20 RX ADMIN — GABAPENTIN 100 MILLIGRAM(S): 400 CAPSULE ORAL at 22:32

## 2021-06-20 RX ADMIN — OXYCODONE AND ACETAMINOPHEN 1 TABLET(S): 5; 325 TABLET ORAL at 23:00

## 2021-06-20 RX ADMIN — CEFTRIAXONE 100 MILLIGRAM(S): 500 INJECTION, POWDER, FOR SOLUTION INTRAMUSCULAR; INTRAVENOUS at 09:07

## 2021-06-20 RX ADMIN — Medication 1 PATCH: at 10:53

## 2021-06-20 RX ADMIN — Medication 75 MICROGRAM(S): at 05:29

## 2021-06-20 RX ADMIN — GABAPENTIN 100 MILLIGRAM(S): 400 CAPSULE ORAL at 14:29

## 2021-06-20 RX ADMIN — ENOXAPARIN SODIUM 40 MILLIGRAM(S): 100 INJECTION SUBCUTANEOUS at 11:55

## 2021-06-20 RX ADMIN — Medication 650 MILLIGRAM(S): at 09:12

## 2021-06-20 RX ADMIN — GABAPENTIN 100 MILLIGRAM(S): 400 CAPSULE ORAL at 05:30

## 2021-06-20 RX ADMIN — DULOXETINE HYDROCHLORIDE 20 MILLIGRAM(S): 30 CAPSULE, DELAYED RELEASE ORAL at 14:29

## 2021-06-20 RX ADMIN — OXYCODONE AND ACETAMINOPHEN 1 TABLET(S): 5; 325 TABLET ORAL at 22:00

## 2021-06-20 NOTE — PROGRESS NOTE ADULT - ASSESSMENT
74 y/o female with  hypothyroid, HLD, neuropathy, impaired balance and gait, walks with a walker, anxiety/depression, apparently was just d/anjum from [facility] 2 days ago, but as per family, pt has not been eating.  Pt states she also slid from couch, as she tried to get up but states she feels fatigued and weak.   Pt denies fever, chills, abd pain, nausea, vomiting.  Pt noted to have elev WBC, and sl elev lactate.   Lactate improved s/p fluid bolus.    Urinary Tract Infection  - day 3 ceftriaxone  - blood cultures, urine cultures NGTD    Lactic acidosis  - resolved     Dementia  - may be worsening  - Pt is awake however not alert to current situation    Hypokalemia - resolved   - monitor BMP    Hypothyroidism  - Synthroid 75 mcg    HLD  - simvastatin 10    Mood disorder  - Cymbalta, Xanax prn    Tobacco dependence  - smoking cessation - counseled, nicotine patch    DVT prophylaxis    Dispo: suspect discharge today or tomorrow

## 2021-06-21 ENCOUNTER — TRANSCRIPTION ENCOUNTER (OUTPATIENT)
Age: 76
End: 2021-06-21

## 2021-06-21 LAB
ALBUMIN SERPL ELPH-MCNC: 3.1 G/DL — LOW (ref 3.3–5)
ALP SERPL-CCNC: 58 U/L — SIGNIFICANT CHANGE UP (ref 40–120)
ALT FLD-CCNC: 36 U/L — SIGNIFICANT CHANGE UP (ref 10–45)
ANION GAP SERPL CALC-SCNC: 7 MMOL/L — SIGNIFICANT CHANGE UP (ref 5–17)
AST SERPL-CCNC: 22 U/L — SIGNIFICANT CHANGE UP (ref 10–40)
BASOPHILS # BLD AUTO: 0.06 K/UL — SIGNIFICANT CHANGE UP (ref 0–0.2)
BASOPHILS NFR BLD AUTO: 0.7 % — SIGNIFICANT CHANGE UP (ref 0–2)
BILIRUB SERPL-MCNC: 0.4 MG/DL — SIGNIFICANT CHANGE UP (ref 0.2–1.2)
BUN SERPL-MCNC: 8 MG/DL — SIGNIFICANT CHANGE UP (ref 7–23)
CALCIUM SERPL-MCNC: 9 MG/DL — SIGNIFICANT CHANGE UP (ref 8.4–10.5)
CHLORIDE SERPL-SCNC: 108 MMOL/L — SIGNIFICANT CHANGE UP (ref 96–108)
CO2 SERPL-SCNC: 28 MMOL/L — SIGNIFICANT CHANGE UP (ref 22–31)
CREAT SERPL-MCNC: 0.98 MG/DL — SIGNIFICANT CHANGE UP (ref 0.5–1.3)
EOSINOPHIL # BLD AUTO: 0.31 K/UL — SIGNIFICANT CHANGE UP (ref 0–0.5)
EOSINOPHIL NFR BLD AUTO: 3.9 % — SIGNIFICANT CHANGE UP (ref 0–6)
GLUCOSE SERPL-MCNC: 101 MG/DL — HIGH (ref 70–99)
HCT VFR BLD CALC: 41.4 % — SIGNIFICANT CHANGE UP (ref 34.5–45)
HGB BLD-MCNC: 13.7 G/DL — SIGNIFICANT CHANGE UP (ref 11.5–15.5)
IMM GRANULOCYTES NFR BLD AUTO: 0.6 % — SIGNIFICANT CHANGE UP (ref 0–1.5)
LYMPHOCYTES # BLD AUTO: 2.75 K/UL — SIGNIFICANT CHANGE UP (ref 1–3.3)
LYMPHOCYTES # BLD AUTO: 34.2 % — SIGNIFICANT CHANGE UP (ref 13–44)
MCHC RBC-ENTMCNC: 28.9 PG — SIGNIFICANT CHANGE UP (ref 27–34)
MCHC RBC-ENTMCNC: 33.1 GM/DL — SIGNIFICANT CHANGE UP (ref 32–36)
MCV RBC AUTO: 87.3 FL — SIGNIFICANT CHANGE UP (ref 80–100)
MONOCYTES # BLD AUTO: 0.61 K/UL — SIGNIFICANT CHANGE UP (ref 0–0.9)
MONOCYTES NFR BLD AUTO: 7.6 % — SIGNIFICANT CHANGE UP (ref 2–14)
NEUTROPHILS # BLD AUTO: 4.27 K/UL — SIGNIFICANT CHANGE UP (ref 1.8–7.4)
NEUTROPHILS NFR BLD AUTO: 53 % — SIGNIFICANT CHANGE UP (ref 43–77)
NRBC # BLD: 0 /100 WBCS — SIGNIFICANT CHANGE UP (ref 0–0)
PLATELET # BLD AUTO: 229 K/UL — SIGNIFICANT CHANGE UP (ref 150–400)
POTASSIUM SERPL-MCNC: 3.7 MMOL/L — SIGNIFICANT CHANGE UP (ref 3.5–5.3)
POTASSIUM SERPL-SCNC: 3.7 MMOL/L — SIGNIFICANT CHANGE UP (ref 3.5–5.3)
PROT SERPL-MCNC: 6.4 G/DL — SIGNIFICANT CHANGE UP (ref 6–8.3)
RBC # BLD: 4.74 M/UL — SIGNIFICANT CHANGE UP (ref 3.8–5.2)
RBC # FLD: 14 % — SIGNIFICANT CHANGE UP (ref 10.3–14.5)
SODIUM SERPL-SCNC: 143 MMOL/L — SIGNIFICANT CHANGE UP (ref 135–145)
WBC # BLD: 8.05 K/UL — SIGNIFICANT CHANGE UP (ref 3.8–10.5)
WBC # FLD AUTO: 8.05 K/UL — SIGNIFICANT CHANGE UP (ref 3.8–10.5)

## 2021-06-21 PROCEDURE — 99232 SBSQ HOSP IP/OBS MODERATE 35: CPT

## 2021-06-21 RX ORDER — NICOTINE POLACRILEX 2 MG
0 GUM BUCCAL
Qty: 0 | Refills: 0 | DISCHARGE
Start: 2021-06-21

## 2021-06-21 RX ADMIN — Medication 1 PATCH: at 18:40

## 2021-06-21 RX ADMIN — CEFTRIAXONE 100 MILLIGRAM(S): 500 INJECTION, POWDER, FOR SOLUTION INTRAMUSCULAR; INTRAVENOUS at 08:13

## 2021-06-21 RX ADMIN — Medication 1 PATCH: at 10:26

## 2021-06-21 RX ADMIN — GABAPENTIN 100 MILLIGRAM(S): 400 CAPSULE ORAL at 15:17

## 2021-06-21 RX ADMIN — Medication 650 MILLIGRAM(S): at 19:28

## 2021-06-21 RX ADMIN — OXYCODONE AND ACETAMINOPHEN 1 TABLET(S): 5; 325 TABLET ORAL at 15:40

## 2021-06-21 RX ADMIN — Medication 0.25 MILLIGRAM(S): at 20:24

## 2021-06-21 RX ADMIN — Medication 75 MICROGRAM(S): at 05:36

## 2021-06-21 RX ADMIN — GABAPENTIN 100 MILLIGRAM(S): 400 CAPSULE ORAL at 20:17

## 2021-06-21 RX ADMIN — Medication 1 PATCH: at 11:28

## 2021-06-21 RX ADMIN — ENOXAPARIN SODIUM 40 MILLIGRAM(S): 100 INJECTION SUBCUTANEOUS at 11:28

## 2021-06-21 RX ADMIN — GABAPENTIN 100 MILLIGRAM(S): 400 CAPSULE ORAL at 05:36

## 2021-06-21 RX ADMIN — SIMVASTATIN 10 MILLIGRAM(S): 20 TABLET, FILM COATED ORAL at 20:17

## 2021-06-21 RX ADMIN — OXYCODONE AND ACETAMINOPHEN 1 TABLET(S): 5; 325 TABLET ORAL at 16:00

## 2021-06-21 RX ADMIN — Medication 650 MILLIGRAM(S): at 18:28

## 2021-06-21 RX ADMIN — DULOXETINE HYDROCHLORIDE 20 MILLIGRAM(S): 30 CAPSULE, DELAYED RELEASE ORAL at 11:30

## 2021-06-21 RX ADMIN — Medication 1 PATCH: at 11:31

## 2021-06-21 NOTE — DISCHARGE NOTE PROVIDER - HOSPITAL COURSE
Hospital Course  HPI:  74 y/o female with hypothyroid, HLD, neuropathy, impaired balance and gait, walks with a walker, anxiety/depression, apparently was just d/anjum from [facility] 2 days ago, but as per family, pt has not been eating.  Pt states she also slid from couch, as she tried to get up but states she feels fatigued and weak.   Pt denies fever, chills, abd pain, nausea, vomiting.  Pt noted to have elev WBC, and sl elev lactate.   Lactate improved s/p fluid bolus. (18 Jun 2021 00:23)    You were admitted for weakness.  You were diagnosed with Urinary Tract Infection.   You were treated with IV antibiotics for your UTI.   You completed your antibiotics in the hospital.     You will need to follow up with your primary care physician.    Discharging Provider:  Mir Mandel MD  Contact Info: Cell [phone] - Please call with any questions or concerns.    Outpatient Provider: Dr. Reyes, notified

## 2021-06-21 NOTE — DISCHARGE NOTE PROVIDER - NSDCCPCAREPLAN_GEN_ALL_CORE_FT
PRINCIPAL DISCHARGE DIAGNOSIS  Diagnosis: Acute UTI  Assessment and Plan of Treatment: You were admitted for weakness.  You were diagnosed with Urinary Tract Infection.   You were treated with IV antibiotics for your UTI.   You completed your antibiotics in the hospital.   You will need to follow up with your primary care physician.

## 2021-06-21 NOTE — DISCHARGE NOTE PROVIDER - CARE PROVIDER_API CALL
Reyes, John A (MD)  Internal Medicine  [address] [facility], [address]  [place] [address]  Phone: [phone]  Fax: [phone]  Follow Up Time:

## 2021-06-21 NOTE — DISCHARGE NOTE PROVIDER - NSDCMRMEDTOKEN_GEN_ALL_CORE_FT
ALPRAZolam 0.5 mg oral tablet: orally 2 times a day, As Needed  Cymbalta 20 mg oral delayed release capsule: 1 cap(s) orally 2 times a day  gabapentin 100 mg oral capsule: 1 cap(s) orally 3 times a day  nicotine 14 mg/24 hr transdermal film, extended release:  transdermal   oxycodone-acetaminophen 5 mg-325 mg oral tablet: 1 tab(s) orally every 4 hours MDD:20mg  simvastatin 10 mg oral tablet: 1 tab(s) orally once a day (at bedtime)  Synthroid 75 mcg (0.075 mg) oral tablet: 1 tab(s) orally once a day

## 2021-06-22 LAB
ALBUMIN SERPL ELPH-MCNC: 3.2 G/DL — LOW (ref 3.3–5)
ALP SERPL-CCNC: 56 U/L — SIGNIFICANT CHANGE UP (ref 40–120)
ALT FLD-CCNC: 36 U/L — SIGNIFICANT CHANGE UP (ref 10–45)
ANION GAP SERPL CALC-SCNC: 9 MMOL/L — SIGNIFICANT CHANGE UP (ref 5–17)
AST SERPL-CCNC: 26 U/L — SIGNIFICANT CHANGE UP (ref 10–40)
BASOPHILS # BLD AUTO: 0.05 K/UL — SIGNIFICANT CHANGE UP (ref 0–0.2)
BASOPHILS NFR BLD AUTO: 0.5 % — SIGNIFICANT CHANGE UP (ref 0–2)
BILIRUB SERPL-MCNC: 0.3 MG/DL — SIGNIFICANT CHANGE UP (ref 0.2–1.2)
BUN SERPL-MCNC: 11 MG/DL — SIGNIFICANT CHANGE UP (ref 7–23)
CALCIUM SERPL-MCNC: 9.8 MG/DL — SIGNIFICANT CHANGE UP (ref 8.4–10.5)
CHLORIDE SERPL-SCNC: 107 MMOL/L — SIGNIFICANT CHANGE UP (ref 96–108)
CO2 SERPL-SCNC: 27 MMOL/L — SIGNIFICANT CHANGE UP (ref 22–31)
CREAT SERPL-MCNC: 0.94 MG/DL — SIGNIFICANT CHANGE UP (ref 0.5–1.3)
EOSINOPHIL # BLD AUTO: 0.32 K/UL — SIGNIFICANT CHANGE UP (ref 0–0.5)
EOSINOPHIL NFR BLD AUTO: 3.5 % — SIGNIFICANT CHANGE UP (ref 0–6)
GLUCOSE SERPL-MCNC: 97 MG/DL — SIGNIFICANT CHANGE UP (ref 70–99)
HCT VFR BLD CALC: 43.8 % — SIGNIFICANT CHANGE UP (ref 34.5–45)
HGB BLD-MCNC: 14.2 G/DL — SIGNIFICANT CHANGE UP (ref 11.5–15.5)
IMM GRANULOCYTES NFR BLD AUTO: 0.5 % — SIGNIFICANT CHANGE UP (ref 0–1.5)
LYMPHOCYTES # BLD AUTO: 3.32 K/UL — HIGH (ref 1–3.3)
LYMPHOCYTES # BLD AUTO: 36.2 % — SIGNIFICANT CHANGE UP (ref 13–44)
MCHC RBC-ENTMCNC: 29.5 PG — SIGNIFICANT CHANGE UP (ref 27–34)
MCHC RBC-ENTMCNC: 32.4 GM/DL — SIGNIFICANT CHANGE UP (ref 32–36)
MCV RBC AUTO: 90.9 FL — SIGNIFICANT CHANGE UP (ref 80–100)
MONOCYTES # BLD AUTO: 0.81 K/UL — SIGNIFICANT CHANGE UP (ref 0–0.9)
MONOCYTES NFR BLD AUTO: 8.8 % — SIGNIFICANT CHANGE UP (ref 2–14)
NEUTROPHILS # BLD AUTO: 4.63 K/UL — SIGNIFICANT CHANGE UP (ref 1.8–7.4)
NEUTROPHILS NFR BLD AUTO: 50.5 % — SIGNIFICANT CHANGE UP (ref 43–77)
NRBC # BLD: 0 /100 WBCS — SIGNIFICANT CHANGE UP (ref 0–0)
PLATELET # BLD AUTO: 255 K/UL — SIGNIFICANT CHANGE UP (ref 150–400)
POTASSIUM SERPL-MCNC: 4.1 MMOL/L — SIGNIFICANT CHANGE UP (ref 3.5–5.3)
POTASSIUM SERPL-SCNC: 4.1 MMOL/L — SIGNIFICANT CHANGE UP (ref 3.5–5.3)
PROT SERPL-MCNC: 6.6 G/DL — SIGNIFICANT CHANGE UP (ref 6–8.3)
RBC # BLD: 4.82 M/UL — SIGNIFICANT CHANGE UP (ref 3.8–5.2)
RBC # FLD: 14.2 % — SIGNIFICANT CHANGE UP (ref 10.3–14.5)
SODIUM SERPL-SCNC: 143 MMOL/L — SIGNIFICANT CHANGE UP (ref 135–145)
WBC # BLD: 9.18 K/UL — SIGNIFICANT CHANGE UP (ref 3.8–10.5)
WBC # FLD AUTO: 9.18 K/UL — SIGNIFICANT CHANGE UP (ref 3.8–10.5)

## 2021-06-22 PROCEDURE — 99222 1ST HOSP IP/OBS MODERATE 55: CPT | Mod: GC

## 2021-06-22 PROCEDURE — 99232 SBSQ HOSP IP/OBS MODERATE 35: CPT

## 2021-06-22 RX ADMIN — Medication 1 PATCH: at 12:07

## 2021-06-22 RX ADMIN — Medication 650 MILLIGRAM(S): at 17:18

## 2021-06-22 RX ADMIN — Medication 1 PATCH: at 06:31

## 2021-06-22 RX ADMIN — Medication 650 MILLIGRAM(S): at 16:26

## 2021-06-22 RX ADMIN — SIMVASTATIN 10 MILLIGRAM(S): 20 TABLET, FILM COATED ORAL at 21:26

## 2021-06-22 RX ADMIN — Medication 75 MICROGRAM(S): at 05:11

## 2021-06-22 RX ADMIN — ENOXAPARIN SODIUM 40 MILLIGRAM(S): 100 INJECTION SUBCUTANEOUS at 12:06

## 2021-06-22 RX ADMIN — OXYCODONE AND ACETAMINOPHEN 1 TABLET(S): 5; 325 TABLET ORAL at 07:40

## 2021-06-22 RX ADMIN — GABAPENTIN 100 MILLIGRAM(S): 400 CAPSULE ORAL at 12:06

## 2021-06-22 RX ADMIN — GABAPENTIN 100 MILLIGRAM(S): 400 CAPSULE ORAL at 21:26

## 2021-06-22 RX ADMIN — OXYCODONE AND ACETAMINOPHEN 1 TABLET(S): 5; 325 TABLET ORAL at 06:40

## 2021-06-22 RX ADMIN — OXYCODONE AND ACETAMINOPHEN 1 TABLET(S): 5; 325 TABLET ORAL at 22:28

## 2021-06-22 RX ADMIN — GABAPENTIN 100 MILLIGRAM(S): 400 CAPSULE ORAL at 05:11

## 2021-06-22 RX ADMIN — OXYCODONE AND ACETAMINOPHEN 1 TABLET(S): 5; 325 TABLET ORAL at 21:28

## 2021-06-22 RX ADMIN — DULOXETINE HYDROCHLORIDE 20 MILLIGRAM(S): 30 CAPSULE, DELAYED RELEASE ORAL at 12:07

## 2021-06-22 NOTE — DIETITIAN INITIAL EVALUATION ADULT. - OTHER INFO
76 y/o female with  hypothyroid, HLD, neuropathy, impaired balance and gait, walks with a walker, anxiety/depression, apparently was just d/anjum from nursing facility , admitted  decreased oral intake, generalized weakness. patient currently noted to vary   10-50%  as per nursing . No GI upset reported . Unknown weight change as per patient . Patien does not appear receptive to po snacks /supplements . (+) BM (6/21), Skin intact , red blanchable .

## 2021-06-22 NOTE — PROGRESS NOTE ADULT - ASSESSMENT
74 y/o female with  hypothyroid, HLD, neuropathy, impaired balance and gait, walks with a walker, anxiety/depression, apparently was just d/anjum from [employer] 2 days ago, but as per family, pt has not been eating.  Pt states she also slid from couch, as she tried to get up but states she feels fatigued and weak.   Pt denies fever, chills, abd pain, nausea, vomiting.  Pt noted to have elev WBC, and sl elev lactate.   Lactate improved s/p fluid bolus.    Urinary Tract Infection  - completed antibiotics (3 day course ceftriaxone)  - blood cultures, urine cultures NGTD    Lactic acidosis  - resolved     Dementia  - mildly confused, per  likely near baseline     Hypokalemia - resolved   - monitor BMP    Hypothyroidism  - Synthroid 75 mcg    HLD  - simvastatin 10    Mood disorder  - Cymbalta, Xanax prn    Tobacco dependence  - smoking cessation - counseled, nicotine patch    DVT prophylaxis    Dispo: awaiting acute rehab evaluation, if declined then will pursue [facility] -  [place].

## 2021-06-22 NOTE — OCCUPATIONAL THERAPY INITIAL EVALUATION ADULT - LEVEL OF INDEPENDENCE: CHAIR TO BED, REHAB EVAL
Essential hypertension    Malignant neoplasm of left female breast, unspecified estrogen receptor status, unspecified site of breast dependent (less than 25% patients effort)

## 2021-06-22 NOTE — OCCUPATIONAL THERAPY INITIAL EVALUATION ADULT - PLANNED THERAPY INTERVENTIONS, OT EVAL
ADL retraining/balance training/bed mobility training/motor coordination training/parent/caregiver training.../ROM/strengthening/transfer training

## 2021-06-22 NOTE — DIETITIAN INITIAL EVALUATION ADULT. - PERTINENT LABORATORY DATA
Date: 22 Jun 2021 05:45  Hemoglobin 14.2   Hematocrit 43.8     Date: 06-22  Sodium 143  Potassium 4.1  Glucose Serum 97  BUN 11  Creatinine 0.94    ACCUCHECK

## 2021-06-22 NOTE — CONSULT NOTE ADULT - SUBJECTIVE AND OBJECTIVE BOX
HPI:  76 y/o female with hypothyroid, HLD, neuropathy, impaired balance and gait, walks with a walker, anxiety/depression, apparently was just d/anjum from [employer] 2 days ago, but as per family, pt has not been eating.  Pt states she also slid from couch, as she tried to get up but states she feels fatigued and weak.   Pt denies fever, chills, abd pain, nausea, vomiting.  Pt noted to have elev WBC, and sl elev lactate.   Lactate improved s/p fluid bolus. (18 Jun 2021 00:23)  Diagnosed with UTI, now completed abx  [facility]       REVIEW OF SYSTEMS: No chest pain, shortness of breath, nausea, vomiting or diarhea.      PAST MEDICAL & SURGICAL HISTORY  PTSD (post-traumatic stress disorder)    Anxiety    High cholesterol    Hypertension    Agoraphobia    Chronic back pain    No significant past surgical history        SOCIAL HISTORY  Smoking - Denied, EtOH - Denied, Drugs - Denied    FUNCTIONAL HISTORY:   Lives   Independent    CURRENT FUNCTIONAL STATUS:      FAMILY HISTORY       RECENT LABS/IMAGING  CBC Full  -  ( 22 Jun 2021 05:45 )  WBC Count : 9.18 K/uL  RBC Count : 4.82 M/uL  Hemoglobin : 14.2 g/dL  Hematocrit : 43.8 %  Platelet Count - Automated : 255 K/uL  Mean Cell Volume : 90.9 fl  Mean Cell Hemoglobin : 29.5 pg  Mean Cell Hemoglobin Concentration : 32.4 gm/dL  Auto Neutrophil # : 4.63 K/uL  Auto Lymphocyte # : 3.32 K/uL  Auto Monocyte # : 0.81 K/uL  Auto Eosinophil # : 0.32 K/uL  Auto Basophil # : 0.05 K/uL  Auto Neutrophil % : 50.5 %  Auto Lymphocyte % : 36.2 %  Auto Monocyte % : 8.8 %  Auto Eosinophil % : 3.5 %  Auto Basophil % : 0.5 %    06-22    143  |  107  |  11  ----------------------------<  97  4.1   |  27  |  0.94    Ca    9.8      22 Jun 2021 05:45    TPro  6.6  /  Alb  3.2<L>  /  TBili  0.3  /  DBili  x   /  AST  26  /  ALT  36  /  AlkPhos  56  06-22        VITALS  T(C): 36.8 (06-22-21 @ 09:43), Max: 37.4 (06-21-21 @ 16:35)  HR: 79 (06-22-21 @ 10:15) (66 - 85)  BP: 126/71 (06-22-21 @ 10:15) (126/71 - 155/80)  RR: 18 (06-22-21 @ 09:43) (16 - 19)  SpO2: 100% (06-22-21 @ 10:15) (95% - 100%)  Wt(kg): --    ALLERGIES  epinephrine (Unknown)  norepinephrine (Unknown)      MEDICATIONS   acetaminophen   Tablet .. 650 milliGRAM(s) Oral every 6 hours PRN  ALPRAZolam 0.25 milliGRAM(s) Oral every 12 hours PRN  DULoxetine 20 milliGRAM(s) Oral daily  enoxaparin Injectable 40 milliGRAM(s) SubCutaneous daily  gabapentin 100 milliGRAM(s) Oral three times a day  levothyroxine 75 MICROGram(s) Oral daily  nicotine -  14 mG/24Hr(s) Patch 1 patch Transdermal daily  oxycodone    5 mG/acetaminophen 325 mG 1 Tablet(s) Oral every 6 hours PRN  simvastatin 10 milliGRAM(s) Oral at bedtime      ----------------------------------------------------------------------------------------  PHYSICAL EXAM  Constitutional - NAD, Comfortable  HEENT - NCAT, EOMI  Neck - Supple, No limited ROM  Chest - CTA bilaterally, No wheeze, No rhonchi, No crackles  Cardiovascular - RRR, S1S2, No murmurs  Abdomen - BS+, Soft, NTND  Extremities - No C/C/E, No calf tenderness   Neurologic Exam - awake, alert   LE's 3-/5, uppers antigravity                     Psychiatric - Mood stable, Affect WNL

## 2021-06-22 NOTE — DIETITIAN INITIAL EVALUATION ADULT. - PERTINENT MEDS FT
MEDICATIONS  (STANDING):  DULoxetine 20 milliGRAM(s) Oral daily  enoxaparin Injectable 40 milliGRAM(s) SubCutaneous daily  gabapentin 100 milliGRAM(s) Oral three times a day  levothyroxine 75 MICROGram(s) Oral daily  nicotine -  14 mG/24Hr(s) Patch 1 patch Transdermal daily  simvastatin 10 milliGRAM(s) Oral at bedtime    MEDICATIONS  (PRN):  acetaminophen   Tablet .. 650 milliGRAM(s) Oral every 6 hours PRN Temp greater or equal to 38C (100.4F), Mild Pain (1 - 3)  ALPRAZolam 0.25 milliGRAM(s) Oral every 12 hours PRN anxiety  oxycodone    5 mG/acetaminophen 325 mG 1 Tablet(s) Oral every 6 hours PRN Severe Pain (7 - 10)

## 2021-06-23 ENCOUNTER — TRANSCRIPTION ENCOUNTER (OUTPATIENT)
Age: 76
End: 2021-06-23

## 2021-06-23 VITALS
TEMPERATURE: 99 F | SYSTOLIC BLOOD PRESSURE: 110 MMHG | OXYGEN SATURATION: 96 % | RESPIRATION RATE: 16 BRPM | DIASTOLIC BLOOD PRESSURE: 71 MMHG | HEART RATE: 83 BPM

## 2021-06-23 LAB
ALBUMIN SERPL ELPH-MCNC: 3.3 G/DL — SIGNIFICANT CHANGE UP (ref 3.3–5)
ALP SERPL-CCNC: 66 U/L — SIGNIFICANT CHANGE UP (ref 40–120)
ALT FLD-CCNC: 42 U/L — SIGNIFICANT CHANGE UP (ref 10–45)
ANION GAP SERPL CALC-SCNC: 11 MMOL/L — SIGNIFICANT CHANGE UP (ref 5–17)
AST SERPL-CCNC: 26 U/L — SIGNIFICANT CHANGE UP (ref 10–40)
BASOPHILS # BLD AUTO: 0.09 K/UL — SIGNIFICANT CHANGE UP (ref 0–0.2)
BASOPHILS NFR BLD AUTO: 0.9 % — SIGNIFICANT CHANGE UP (ref 0–2)
BILIRUB SERPL-MCNC: 0.3 MG/DL — SIGNIFICANT CHANGE UP (ref 0.2–1.2)
BUN SERPL-MCNC: 14 MG/DL — SIGNIFICANT CHANGE UP (ref 7–23)
CALCIUM SERPL-MCNC: 9.2 MG/DL — SIGNIFICANT CHANGE UP (ref 8.4–10.5)
CHLORIDE SERPL-SCNC: 106 MMOL/L — SIGNIFICANT CHANGE UP (ref 96–108)
CO2 SERPL-SCNC: 24 MMOL/L — SIGNIFICANT CHANGE UP (ref 22–31)
CREAT SERPL-MCNC: 0.99 MG/DL — SIGNIFICANT CHANGE UP (ref 0.5–1.3)
CULTURE RESULTS: SIGNIFICANT CHANGE UP
CULTURE RESULTS: SIGNIFICANT CHANGE UP
EOSINOPHIL # BLD AUTO: 0.32 K/UL — SIGNIFICANT CHANGE UP (ref 0–0.5)
EOSINOPHIL NFR BLD AUTO: 3.4 % — SIGNIFICANT CHANGE UP (ref 0–6)
GLUCOSE SERPL-MCNC: 104 MG/DL — HIGH (ref 70–99)
HCT VFR BLD CALC: 43.7 % — SIGNIFICANT CHANGE UP (ref 34.5–45)
HGB BLD-MCNC: 14.1 G/DL — SIGNIFICANT CHANGE UP (ref 11.5–15.5)
IMM GRANULOCYTES NFR BLD AUTO: 0.5 % — SIGNIFICANT CHANGE UP (ref 0–1.5)
LYMPHOCYTES # BLD AUTO: 3.37 K/UL — HIGH (ref 1–3.3)
LYMPHOCYTES # BLD AUTO: 35.4 % — SIGNIFICANT CHANGE UP (ref 13–44)
MCHC RBC-ENTMCNC: 29.4 PG — SIGNIFICANT CHANGE UP (ref 27–34)
MCHC RBC-ENTMCNC: 32.3 GM/DL — SIGNIFICANT CHANGE UP (ref 32–36)
MCV RBC AUTO: 91.2 FL — SIGNIFICANT CHANGE UP (ref 80–100)
MONOCYTES # BLD AUTO: 0.84 K/UL — SIGNIFICANT CHANGE UP (ref 0–0.9)
MONOCYTES NFR BLD AUTO: 8.8 % — SIGNIFICANT CHANGE UP (ref 2–14)
NEUTROPHILS # BLD AUTO: 4.86 K/UL — SIGNIFICANT CHANGE UP (ref 1.8–7.4)
NEUTROPHILS NFR BLD AUTO: 51 % — SIGNIFICANT CHANGE UP (ref 43–77)
NRBC # BLD: 0 /100 WBCS — SIGNIFICANT CHANGE UP (ref 0–0)
PLATELET # BLD AUTO: 254 K/UL — SIGNIFICANT CHANGE UP (ref 150–400)
POTASSIUM SERPL-MCNC: 4 MMOL/L — SIGNIFICANT CHANGE UP (ref 3.5–5.3)
POTASSIUM SERPL-SCNC: 4 MMOL/L — SIGNIFICANT CHANGE UP (ref 3.5–5.3)
PROT SERPL-MCNC: 6.7 G/DL — SIGNIFICANT CHANGE UP (ref 6–8.3)
RBC # BLD: 4.79 M/UL — SIGNIFICANT CHANGE UP (ref 3.8–5.2)
RBC # FLD: 14.3 % — SIGNIFICANT CHANGE UP (ref 10.3–14.5)
SODIUM SERPL-SCNC: 141 MMOL/L — SIGNIFICANT CHANGE UP (ref 135–145)
SPECIMEN SOURCE: SIGNIFICANT CHANGE UP
SPECIMEN SOURCE: SIGNIFICANT CHANGE UP
WBC # BLD: 9.53 K/UL — SIGNIFICANT CHANGE UP (ref 3.8–10.5)
WBC # FLD AUTO: 9.53 K/UL — SIGNIFICANT CHANGE UP (ref 3.8–10.5)

## 2021-06-23 PROCEDURE — 83735 ASSAY OF MAGNESIUM: CPT

## 2021-06-23 PROCEDURE — 87040 BLOOD CULTURE FOR BACTERIA: CPT

## 2021-06-23 PROCEDURE — 85730 THROMBOPLASTIN TIME PARTIAL: CPT

## 2021-06-23 PROCEDURE — 99239 HOSP IP/OBS DSCHRG MGMT >30: CPT

## 2021-06-23 PROCEDURE — 84443 ASSAY THYROID STIM HORMONE: CPT

## 2021-06-23 PROCEDURE — 87635 SARS-COV-2 COVID-19 AMP PRB: CPT

## 2021-06-23 PROCEDURE — 99285 EMERGENCY DEPT VISIT HI MDM: CPT | Mod: 25

## 2021-06-23 PROCEDURE — 36415 COLL VENOUS BLD VENIPUNCTURE: CPT

## 2021-06-23 PROCEDURE — 81001 URINALYSIS AUTO W/SCOPE: CPT

## 2021-06-23 PROCEDURE — 80053 COMPREHEN METABOLIC PANEL: CPT

## 2021-06-23 PROCEDURE — 96374 THER/PROPH/DIAG INJ IV PUSH: CPT

## 2021-06-23 PROCEDURE — 86769 SARS-COV-2 COVID-19 ANTIBODY: CPT

## 2021-06-23 PROCEDURE — 97162 PT EVAL MOD COMPLEX 30 MIN: CPT

## 2021-06-23 PROCEDURE — 87086 URINE CULTURE/COLONY COUNT: CPT

## 2021-06-23 PROCEDURE — 97166 OT EVAL MOD COMPLEX 45 MIN: CPT

## 2021-06-23 PROCEDURE — 85025 COMPLETE CBC W/AUTO DIFF WBC: CPT

## 2021-06-23 PROCEDURE — 71045 X-RAY EXAM CHEST 1 VIEW: CPT

## 2021-06-23 PROCEDURE — 96361 HYDRATE IV INFUSION ADD-ON: CPT

## 2021-06-23 PROCEDURE — 83605 ASSAY OF LACTIC ACID: CPT

## 2021-06-23 PROCEDURE — 85610 PROTHROMBIN TIME: CPT

## 2021-06-23 RX ADMIN — Medication 75 MICROGRAM(S): at 05:35

## 2021-06-23 RX ADMIN — ENOXAPARIN SODIUM 40 MILLIGRAM(S): 100 INJECTION SUBCUTANEOUS at 11:09

## 2021-06-23 RX ADMIN — OXYCODONE AND ACETAMINOPHEN 1 TABLET(S): 5; 325 TABLET ORAL at 11:06

## 2021-06-23 RX ADMIN — OXYCODONE AND ACETAMINOPHEN 1 TABLET(S): 5; 325 TABLET ORAL at 12:00

## 2021-06-23 RX ADMIN — Medication 1 PATCH: at 11:09

## 2021-06-23 RX ADMIN — Medication 1 PATCH: at 11:10

## 2021-06-23 RX ADMIN — Medication 0.25 MILLIGRAM(S): at 12:09

## 2021-06-23 RX ADMIN — DULOXETINE HYDROCHLORIDE 20 MILLIGRAM(S): 30 CAPSULE, DELAYED RELEASE ORAL at 12:09

## 2021-06-23 RX ADMIN — GABAPENTIN 100 MILLIGRAM(S): 400 CAPSULE ORAL at 05:35

## 2021-06-23 NOTE — DISCHARGE NOTE NURSING/CASE MANAGEMENT/SOCIAL WORK - PATIENT PORTAL LINK FT
You can access the FollowMyHealth Patient Portal offered by [facility] by registering at the following website: [email or web address]. By joining [facility]’s FollowMyHealth portal, you will also be able to view your health information using other applications (apps) compatible with our system.

## 2021-06-23 NOTE — PROGRESS NOTE ADULT - ASSESSMENT
74 y/o female with  hypothyroid, HLD, neuropathy, impaired balance and gait, walks with a walker, anxiety/depression, apparently was just d/anjum from [employer] 2 days ago, but as per family, pt has not been eating.  Pt states she also slid from couch, as she tried to get up but states she feels fatigued and weak.   Pt denies fever, chills, abd pain, nausea, vomiting.  Pt noted to have elev WBC, and sl elev lactate.   Lactate improved s/p fluid bolus.    Urinary Tract Infection  - completed antibiotics (3 day course ceftriaxone)  - blood cultures, urine cultures NGTD    Lactic acidosis  - resolved     Dementia  - mildly confused, per  likely near baseline     Hypokalemia - resolved   - monitor BMP    Hypothyroidism  - Synthroid 75 mcg    HLD  - simvastatin 10    Mood disorder  - Cymbalta, Xanax prn    Tobacco dependence  - smoking cessation - counseled, nicotine patch    DVT prophylaxis    Dispo: not appropriate for acute rehab, for [facility] today

## 2021-06-23 NOTE — DISCHARGE NOTE NURSING/CASE MANAGEMENT/SOCIAL WORK - NSDCVIVACCINE_GEN_ALL_CORE_FT
Tdap; 14-Apr-2021 15:25; Chantelle Meehan (RN); [employer]; [id number] (Exp. Date: 21-Jul-2022); IntraMuscular; Deltoid Right.; 0.5 milliLiter(s); VIS (VIS Published: 09-May-2013, VIS Presented: 14-Apr-2021);

## 2021-06-23 NOTE — PROGRESS NOTE ADULT - SUBJECTIVE AND OBJECTIVE BOX
Patient is a 75y old  Female who presents with a chief complaint of decreased oral intake, generalized weakness (2021 00:23)      Patient seen and examined at bedside.  Denies chest pain, dyspnea, abd pain.    ALLERGIES:  epinephrine (Unknown)  norepinephrine (Unknown)    MEDICATIONS  (STANDING):  cefTRIAXone   IVPB 1000 milliGRAM(s) IV Intermittent every 24 hours  DULoxetine 20 milliGRAM(s) Oral daily  enoxaparin Injectable 40 milliGRAM(s) SubCutaneous daily  gabapentin 100 milliGRAM(s) Oral three times a day  levothyroxine 75 MICROGram(s) Oral daily  nicotine -  14 mG/24Hr(s) Patch 1 patch Transdermal daily  potassium chloride    Tablet ER 40 milliEquivalent(s) Oral every 4 hours  simvastatin 10 milliGRAM(s) Oral at bedtime    MEDICATIONS  (PRN):  acetaminophen   Tablet .. 650 milliGRAM(s) Oral every 6 hours PRN Temp greater or equal to 38C (100.4F), Mild Pain (1 - 3)  ALPRAZolam 0.25 milliGRAM(s) Oral every 12 hours PRN anxiety  oxycodone    5 mG/acetaminophen 325 mG 1 Tablet(s) Oral every 6 hours PRN Severe Pain (7 - 10)    Vital Signs Last 24 Hrs  T(F): 98.5 (2021 05:54), Max: 98.5 (2021 05:54)  HR: 61 (2021 05:54) (61 - 68)  BP: 157/76 (2021 05:54) (139/68 - 157/76)  RR: 16 (2021 05:54) (16 - 16)  SpO2: 98% (2021 05:54) (94% - 98%)  I&O's Summary    2021 07:01  -  2021 07:00  --------------------------------------------------------  IN: 600 mL / OUT: 1000 mL / NET: -400 mL    2021 07:01  -  2021 12:23  --------------------------------------------------------  IN: 50 mL / OUT: 0 mL / NET: 50 mL      BMI (kg/m2): 28.3 (21 @ 22:08)  PHYSICAL EXAM:  General: NAD, A/O x 3  ENT: MMM  Neck: Supple, No JVD  Lungs: Clear to auscultation bilaterally  Cardio: RRR, S1/S2, No murmurs  Abdomen: Soft, Nontender, Nondistended; Bowel sounds present  Extremities: No calf tenderness, No pitting edema    LABS:                        12.5   9.39  )-----------( 189      ( 2021 06:00 )             37.5           144  |  109  |  14  ----------------------------<  86  3.4   |  25  |  0.85    Ca    9.2      2021 06:00  Mg     2.0         TPro  6.1  /  Alb  3.0  /  TBili  0.4  /  DBili  x   /  AST  30  /  ALT  41  /  AlkPhos  52       eGFR if Non African American: 68 mL/min/1.73M2 (21 @ 06:00)  eGFR if : 79 mL/min/1.73M2 (21 @ 06:00)    PT/INR - ( 2021 23:00 )   PT: 13.4 sec;   INR: 1.11 ratio         PTT - ( 2021 23:00 )  PTT:29.1 sec   Lactate, Blood: 1.3 mmol/L ( @ 01:06)  Lactate, Blood: 2.3 mmol/L ( @ 23:00)          TSH 1.17   TSH with FT4 reflex --  Total T3 --                  Urinalysis Basic - ( 2021 23:00 )    Color: Yellow / Appearance: Slightly Turbid / S.020 / pH: x  Gluc: x / Ketone: Negative  / Bili: Negative / Urobili: Negative   Blood: x / Protein: 30 mg/dL / Nitrite: Negative   Leuk Esterase: Moderate / RBC: 5-10 /HPF / WBC >50 /HPF   Sq Epi: x / Non Sq Epi: Moderate / Bacteria: Trace /HPF        Culture - Urine (collected 2021 23:00)  Source: .Urine Clean Catch (Midstream)  Final Report (2021 03:17):    No growth    Culture - Blood (collected 2021 23:00)  Source: .Blood Blood-Peripheral  Preliminary Report (2021 05:01):    No growth to date.    Culture - Blood (collected 2021 23:00)  Source: .Blood Blood-Peripheral  Preliminary Report (2021 05:01):    No growth to date.      COVID-19 PCR: NotDetec (21 @ 23:00)      RADIOLOGY & ADDITIONAL TESTS:    Care Discussed with Consultants/Other Providers:   
Patient is a 75y old  Female who presents with a chief complaint of decreased oral intake, generalized weakness (2021 12:23)      Patient seen and examined at bedside.    ALLERGIES:  epinephrine (Unknown)  norepinephrine (Unknown)    MEDICATIONS  (STANDING):  cefTRIAXone   IVPB 1000 milliGRAM(s) IV Intermittent every 24 hours  DULoxetine 20 milliGRAM(s) Oral daily  enoxaparin Injectable 40 milliGRAM(s) SubCutaneous daily  gabapentin 100 milliGRAM(s) Oral three times a day  levothyroxine 75 MICROGram(s) Oral daily  nicotine -  14 mG/24Hr(s) Patch 1 patch Transdermal daily  simvastatin 10 milliGRAM(s) Oral at bedtime    MEDICATIONS  (PRN):  acetaminophen   Tablet .. 650 milliGRAM(s) Oral every 6 hours PRN Temp greater or equal to 38C (100.4F), Mild Pain (1 - 3)  ALPRAZolam 0.25 milliGRAM(s) Oral every 12 hours PRN anxiety  oxycodone    5 mG/acetaminophen 325 mG 1 Tablet(s) Oral every 6 hours PRN Severe Pain (7 - 10)    Vital Signs Last 24 Hrs  T(F): 98.1 (2021 04:57), Max: 98.8 (2021 21:56)  HR: 65 (2021 04:57) (65 - 67)  BP: 150/79 (2021 04:57) (140/79 - 154/80)  RR: 14 (2021 04:57) (14 - 18)  SpO2: 99% (2021 04:57) (97% - 99%)  I&O's Summary    2021 07:01  -  2021 07:00  --------------------------------------------------------  IN: 470 mL / OUT: 620 mL / NET: -150 mL      BMI (kg/m2): 28.3 (- @ 22:08)  PHYSICAL EXAM:  General: NAD, A/O x 3  ENT: MMM, no scleral icterus  Neck: Supple, No JVD, no thyroidomegaly  Lungs: Clear to auscultation bilaterally, no wheezes, no rales, no rhonchi, good inspiratory effort  Cardio: RRR, S1/S2, No murmurs  Abdomen: Soft, Nontender, Nondistended; Bowel sounds present  Extremities: No calf tenderness, No pitting edema, no skin changes    LABS:                        13.8   8.74  )-----------( 206      ( 2021 05:00 )             42.2       -    141  |  108  |  9   ----------------------------<  95  5.1   |  24  |  0.88    Ca    8.9      2021 05:00  Mg     2.0         TPro  6.6  /  Alb  3.2  /  TBili  0.5  /  DBili  x   /  AST  35  /  ALT  40  /  AlkPhos  67       eGFR if Non African American: 65 mL/min/1.73M2 (21 @ 05:00)  eGFR if : 76 mL/min/1.73M2 (21 @ 05:00)    PT/INR - ( 2021 23:00 )   PT: 13.4 sec;   INR: 1.11 ratio       PTT - ( 2021 23:00 )  PTT:29.1 sec   Lactate, Blood: 1.3 mmol/L ( @ 01:06)  Lactate, Blood: 2.3 mmol/L ( @ 23:00)    TSH 1.17   TSH with FT4 reflex --  Total T3 --    Urinalysis Basic - ( 2021 23:00 )    Color: Yellow / Appearance: Slightly Turbid / S.020 / pH: x  Gluc: x / Ketone: Negative  / Bili: Negative / Urobili: Negative   Blood: x / Protein: 30 mg/dL / Nitrite: Negative   Leuk Esterase: Moderate / RBC: 5-10 /HPF / WBC >50 /HPF   Sq Epi: x / Non Sq Epi: Moderate / Bacteria: Trace /HPF    Culture - Urine (collected 2021 23:00)  Source: .Urine Clean Catch (Midstream)  Final Report (2021 03:17):    No growth    Culture - Blood (collected 2021 23:00)  Source: .Blood Blood-Peripheral  Preliminary Report (2021 05:01):    No growth to date.    Culture - Blood (collected 2021 23:00)  Source: .Blood Blood-Peripheral  Preliminary Report (2021 05:01):    No growth to date.      COVID-19 PCR: NotDetec (21 @ 23:00)      RADIOLOGY & ADDITIONAL TESTS:  Care Discussed with Consultants/Other Providers:   
Patient is a 75y old  Female who presents with a chief complaint of decreased oral intake, generalized weakness (21 Jun 2021 07:16)    Feels okay. Denies chest pain, sob, nausea, vomiting.      Patient seen and examined at bedside.    ALLERGIES:  epinephrine (Unknown)  norepinephrine (Unknown)    MEDICATIONS  (STANDING):  DULoxetine 20 milliGRAM(s) Oral daily  enoxaparin Injectable 40 milliGRAM(s) SubCutaneous daily  gabapentin 100 milliGRAM(s) Oral three times a day  levothyroxine 75 MICROGram(s) Oral daily  nicotine -  14 mG/24Hr(s) Patch 1 patch Transdermal daily  simvastatin 10 milliGRAM(s) Oral at bedtime    MEDICATIONS  (PRN):  acetaminophen   Tablet .. 650 milliGRAM(s) Oral every 6 hours PRN Temp greater or equal to 38C (100.4F), Mild Pain (1 - 3)  ALPRAZolam 0.25 milliGRAM(s) Oral every 12 hours PRN anxiety  oxycodone    5 mG/acetaminophen 325 mG 1 Tablet(s) Oral every 6 hours PRN Severe Pain (7 - 10)    Vital Signs Last 24 Hrs  T(F): 97.8 (22 Jun 2021 06:09), Max: 99.3 (21 Jun 2021 16:35)  HR: 67 (22 Jun 2021 06:09) (63 - 85)  BP: 140/76 (22 Jun 2021 06:09) (139/90 - 155/80)  RR: 18 (22 Jun 2021 06:09) (16 - 19)  SpO2: 99% (22 Jun 2021 06:09) (95% - 99%)  I&O's Summary    21 Jun 2021 07:01  -  22 Jun 2021 07:00  --------------------------------------------------------  IN: 200 mL / OUT: 500 mL / NET: -300 mL    BMI (kg/m2): 28.3 (06-17-21 @ 22:08)  PHYSICAL EXAM:  General: NAD, A/O x 3  ENT: MMM, no scleral icterus  Neck: Supple, No JVD, no thyroidomegaly  Lungs: Clear to auscultation bilaterally, no wheezes, no rales, no rhonchi, good inspiratory effort  Cardio: RRR, S1/S2, No murmurs  Abdomen: Soft, Nontender, Nondistended; Bowel sounds present  Extremities: No calf tenderness, No pitting edema, no skin changes    LABS:                        14.2   9.18  )-----------( 255      ( 22 Jun 2021 05:45 )             43.8       06-22    143  |  107  |  11  ----------------------------<  97  4.1   |  27  |  0.94    Ca    9.8      22 Jun 2021 05:45    TPro  6.6  /  Alb  3.2  /  TBili  0.3  /  DBili  x   /  AST  26  /  ALT  36  /  AlkPhos  56  06-22     eGFR if Non African American: 60 mL/min/1.73M2 (06-22-21 @ 05:45)  eGFR if African American: 70 mL/min/1.73M2 (06-22-21 @ 05:45)     Culture - Urine (collected 17 Jun 2021 23:00)  Source: .Urine Clean Catch (Midstream)  Final Report (19 Jun 2021 03:17):    No growth    Culture - Blood (collected 17 Jun 2021 23:00)  Source: .Blood Blood-Peripheral  Preliminary Report (19 Jun 2021 05:01):    No growth to date.    Culture - Blood (collected 17 Jun 2021 23:00)  Source: .Blood Blood-Peripheral  Preliminary Report (19 Jun 2021 05:01):    No growth to date.      COVID-19 PCR: NotDetec (06-17-21 @ 23:00)      RADIOLOGY & ADDITIONAL TESTS:  Care Discussed with Consultants/Other Providers:   
Patient is a 75y old  Female who presents with a chief complaint of decreased oral intake, generalized weakness (22 Jun 2021 14:02)    Feels okay.  Asking when she is leaving hospital.      Patient seen and examined at bedside.    ALLERGIES:  epinephrine (Unknown)  norepinephrine (Unknown)    MEDICATIONS  (STANDING):  DULoxetine 20 milliGRAM(s) Oral daily  enoxaparin Injectable 40 milliGRAM(s) SubCutaneous daily  gabapentin 100 milliGRAM(s) Oral three times a day  levothyroxine 75 MICROGram(s) Oral daily  nicotine -  14 mG/24Hr(s) Patch 1 patch Transdermal daily  simvastatin 10 milliGRAM(s) Oral at bedtime    MEDICATIONS  (PRN):  acetaminophen   Tablet .. 650 milliGRAM(s) Oral every 6 hours PRN Temp greater or equal to 38C (100.4F), Mild Pain (1 - 3)  ALPRAZolam 0.25 milliGRAM(s) Oral every 12 hours PRN anxiety  oxycodone    5 mG/acetaminophen 325 mG 1 Tablet(s) Oral every 6 hours PRN Severe Pain (7 - 10)    Vital Signs Last 24 Hrs  T(F): 98.8 (23 Jun 2021 05:20), Max: 98.8 (23 Jun 2021 05:20)  HR: 66 (23 Jun 2021 05:20) (66 - 91)  BP: 132/67 (23 Jun 2021 05:20) (116/76 - 136/78)  RR: 18 (23 Jun 2021 05:20) (18 - 20)  SpO2: 98% (23 Jun 2021 05:20) (97% - 100%)  I&O's Summary    22 Jun 2021 07:01  -  23 Jun 2021 07:00  --------------------------------------------------------  IN: 200 mL / OUT: 300 mL / NET: -100 mL    PHYSICAL EXAM:  General: NAD, A/O x 2-3, mildly confused at times   ENT: MMM, no scleral icterus  Neck: Supple, No JVD, no thyroidomegaly  Lungs: Clear to auscultation bilaterally, no wheezes, no rales, no rhonchi, good inspiratory effort  Cardio: RRR, S1/S2, No murmurs  Abdomen: Soft, Nontender, Nondistended; Bowel sounds present  Extremities: No calf tenderness, No pitting edema, no skin changes    LABS:             14.1   9.53  )-----------( 254      ( 23 Jun 2021 07:15 )             43.7       06-22  143  |  107  |  11  ----------------------------<  97  4.1   |  27  |  0.94    Ca    9.8      22 Jun 2021 05:45    TPro  6.6  /  Alb  3.2  /  TBili  0.3  /  DBili  x   /  AST  26  /  ALT  36  /  AlkPhos  56  06-22     eGFR if Non African American: 60 mL/min/1.73M2 (06-22-21 @ 05:45)  eGFR if African American: 70 mL/min/1.73M2 (06-22-21 @ 05:45)    Culture - Urine (collected 17 Jun 2021 23:00)  Source: .Urine Clean Catch (Midstream)  Final Report (19 Jun 2021 03:17):    No growth    Culture - Blood (collected 17 Jun 2021 23:00)  Source: .Blood Blood-Peripheral  Final Report (23 Jun 2021 05:01):    No Growth Final    Culture - Blood (collected 17 Jun 2021 23:00)  Source: .Blood Blood-Peripheral  Final Report (23 Jun 2021 05:01):    No Growth Final      COVID-19 PCR: NotDetec (06-17-21 @ 23:00)      RADIOLOGY & ADDITIONAL TESTS:  Care Discussed with Consultants/Other Providers:   
Patient is a 75y old  Female who presents with a chief complaint of decreased oral intake, generalized weakness (20 Jun 2021 08:07)    Feels better. no overnight events.      Patient seen and examined at bedside.    ALLERGIES:  epinephrine (Unknown)  norepinephrine (Unknown)    MEDICATIONS  (STANDING):  cefTRIAXone   IVPB 1000 milliGRAM(s) IV Intermittent every 24 hours  DULoxetine 20 milliGRAM(s) Oral daily  enoxaparin Injectable 40 milliGRAM(s) SubCutaneous daily  gabapentin 100 milliGRAM(s) Oral three times a day  levothyroxine 75 MICROGram(s) Oral daily  nicotine -  14 mG/24Hr(s) Patch 1 patch Transdermal daily  simvastatin 10 milliGRAM(s) Oral at bedtime    MEDICATIONS  (PRN):  acetaminophen   Tablet .. 650 milliGRAM(s) Oral every 6 hours PRN Temp greater or equal to 38C (100.4F), Mild Pain (1 - 3)  ALPRAZolam 0.25 milliGRAM(s) Oral every 12 hours PRN anxiety  oxycodone    5 mG/acetaminophen 325 mG 1 Tablet(s) Oral every 6 hours PRN Severe Pain (7 - 10)    Vital Signs Last 24 Hrs  T(F): 97.6 (21 Jun 2021 05:46), Max: 98.8 (20 Jun 2021 23:55)  HR: 61 (21 Jun 2021 05:46) (56 - 78)  BP: 152/79 (21 Jun 2021 05:46) (128/77 - 152/85)  RR: 16 (21 Jun 2021 05:46) (12 - 16)  SpO2: 97% (21 Jun 2021 05:46) (97% - 99%)  I&O's Summary    20 Jun 2021 07:01  -  21 Jun 2021 07:00  --------------------------------------------------------  IN: 245 mL / OUT: 1100 mL / NET: -855 mL    BMI (kg/m2): 28.3 (06-17-21 @ 22:08)  PHYSICAL EXAM:  General: NAD, A/O x 3  ENT: MMM, no scleral icterus  Neck: Supple, No JVD, no thyroidomegaly  Lungs: Clear to auscultation bilaterally, no wheezes, no rales, no rhonchi, good inspiratory effort  Cardio: RRR, S1/S2, No murmurs  Abdomen: Soft, Nontender, Nondistended; Bowel sounds present  Extremities: No calf tenderness, No pitting edema, no skin changes    LABS:                        13.8   8.74  )-----------( 206      ( 20 Jun 2021 05:00 )             42.2       06-20    141  |  108  |  9   ----------------------------<  95  5.1   |  24  |  0.88    Ca    8.9      20 Jun 2021 05:00  Mg     2.0     06-18    TPro  6.6  /  Alb  3.2  /  TBili  0.5  /  DBili  x   /  AST  35  /  ALT  40  /  AlkPhos  67  06-20     eGFR if Non African American: 65 mL/min/1.73M2 (06-20-21 @ 05:00)  eGFR if : 76 mL/min/1.73M2 (06-20-21 @ 05:00)               TSH 1.17   TSH with FT4 reflex --  Total T3 --    Culture - Urine (collected 17 Jun 2021 23:00)  Source: .Urine Clean Catch (Midstream)  Final Report (19 Jun 2021 03:17):    No growth    Culture - Blood (collected 17 Jun 2021 23:00)  Source: .Blood Blood-Peripheral  Preliminary Report (19 Jun 2021 05:01):    No growth to date.    Culture - Blood (collected 17 Jun 2021 23:00)  Source: .Blood Blood-Peripheral  Preliminary Report (19 Jun 2021 05:01):    No growth to date.    COVID-19 PCR: NotDetec (06-17-21 @ 23:00)    RADIOLOGY & ADDITIONAL TESTS:  Care Discussed with Consultants/Other Providers:

## 2021-06-23 NOTE — PROGRESS NOTE ADULT - REASON FOR ADMISSION
decreased oral intake, generalized weakness

## 2021-06-24 DIAGNOSIS — F40.02 AGORAPHOBIA W/OUT PANIC DISORDER: ICD-10-CM

## 2021-07-07 ENCOUNTER — APPOINTMENT (OUTPATIENT)
Dept: INTERNAL MEDICINE | Facility: CLINIC | Age: 76
End: 2021-07-07

## 2021-08-30 ENCOUNTER — INPATIENT (INPATIENT)
Facility: HOSPITAL | Age: 76
LOS: 3 days | Discharge: NURSING, REHAB OR LONG-TERM CARE | DRG: 884 | End: 2021-09-03
Attending: STUDENT IN AN ORGANIZED HEALTH CARE EDUCATION/TRAINING PROGRAM | Admitting: INTERNAL MEDICINE
Payer: MEDICARE

## 2021-08-30 VITALS
HEIGHT: 61 IN | OXYGEN SATURATION: 90 % | HEART RATE: 103 BPM | DIASTOLIC BLOOD PRESSURE: 81 MMHG | TEMPERATURE: 99 F | SYSTOLIC BLOOD PRESSURE: 134 MMHG | WEIGHT: 229.94 LBS | RESPIRATION RATE: 16 BRPM
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
## 2021-08-30 LAB
ALBUMIN SERPL ELPH-MCNC: 3.6 G/DL — SIGNIFICANT CHANGE UP (ref 3.3–5)
ALP SERPL-CCNC: 70 U/L — SIGNIFICANT CHANGE UP (ref 40–120)
ALT FLD-CCNC: 37 U/L — SIGNIFICANT CHANGE UP (ref 10–45)
ANION GAP SERPL CALC-SCNC: 10 MMOL/L — SIGNIFICANT CHANGE UP (ref 5–17)
AST SERPL-CCNC: 39 U/L — SIGNIFICANT CHANGE UP (ref 10–40)
BASOPHILS # BLD AUTO: 0.05 K/UL — SIGNIFICANT CHANGE UP (ref 0–0.2)
BASOPHILS NFR BLD AUTO: 0.5 % — SIGNIFICANT CHANGE UP (ref 0–2)
BILIRUB SERPL-MCNC: 0.3 MG/DL — SIGNIFICANT CHANGE UP (ref 0.2–1.2)
BUN SERPL-MCNC: 23 MG/DL — SIGNIFICANT CHANGE UP (ref 7–23)
CALCIUM SERPL-MCNC: 9.4 MG/DL — SIGNIFICANT CHANGE UP (ref 8.4–10.5)
CHLORIDE SERPL-SCNC: 105 MMOL/L — SIGNIFICANT CHANGE UP (ref 96–108)
CO2 SERPL-SCNC: 26 MMOL/L — SIGNIFICANT CHANGE UP (ref 22–31)
CREAT SERPL-MCNC: 1.29 MG/DL — SIGNIFICANT CHANGE UP (ref 0.5–1.3)
EOSINOPHIL # BLD AUTO: 0.36 K/UL — SIGNIFICANT CHANGE UP (ref 0–0.5)
EOSINOPHIL NFR BLD AUTO: 3.7 % — SIGNIFICANT CHANGE UP (ref 0–6)
GLUCOSE SERPL-MCNC: 113 MG/DL — HIGH (ref 70–99)
HCT VFR BLD CALC: 40.9 % — SIGNIFICANT CHANGE UP (ref 34.5–45)
HGB BLD-MCNC: 13 G/DL — SIGNIFICANT CHANGE UP (ref 11.5–15.5)
IMM GRANULOCYTES NFR BLD AUTO: 0.3 % — SIGNIFICANT CHANGE UP (ref 0–1.5)
LYMPHOCYTES # BLD AUTO: 3 K/UL — SIGNIFICANT CHANGE UP (ref 1–3.3)
LYMPHOCYTES # BLD AUTO: 31 % — SIGNIFICANT CHANGE UP (ref 13–44)
MCHC RBC-ENTMCNC: 29.2 PG — SIGNIFICANT CHANGE UP (ref 27–34)
MCHC RBC-ENTMCNC: 31.8 GM/DL — LOW (ref 32–36)
MCV RBC AUTO: 91.9 FL — SIGNIFICANT CHANGE UP (ref 80–100)
MONOCYTES # BLD AUTO: 0.78 K/UL — SIGNIFICANT CHANGE UP (ref 0–0.9)
MONOCYTES NFR BLD AUTO: 8 % — SIGNIFICANT CHANGE UP (ref 2–14)
NEUTROPHILS # BLD AUTO: 5.47 K/UL — SIGNIFICANT CHANGE UP (ref 1.8–7.4)
NEUTROPHILS NFR BLD AUTO: 56.5 % — SIGNIFICANT CHANGE UP (ref 43–77)
NRBC # BLD: 0 /100 WBCS — SIGNIFICANT CHANGE UP (ref 0–0)
PLATELET # BLD AUTO: 313 K/UL — SIGNIFICANT CHANGE UP (ref 150–400)
POTASSIUM SERPL-MCNC: 4.7 MMOL/L — SIGNIFICANT CHANGE UP (ref 3.5–5.3)
POTASSIUM SERPL-SCNC: 4.7 MMOL/L — SIGNIFICANT CHANGE UP (ref 3.5–5.3)
PROT SERPL-MCNC: 7.9 G/DL — SIGNIFICANT CHANGE UP (ref 6–8.3)
RBC # BLD: 4.45 M/UL — SIGNIFICANT CHANGE UP (ref 3.8–5.2)
RBC # FLD: 15.8 % — HIGH (ref 10.3–14.5)
SARS-COV-2 RNA SPEC QL NAA+PROBE: SIGNIFICANT CHANGE UP
SODIUM SERPL-SCNC: 141 MMOL/L — SIGNIFICANT CHANGE UP (ref 135–145)
WBC # BLD: 9.69 K/UL — SIGNIFICANT CHANGE UP (ref 3.8–10.5)
WBC # FLD AUTO: 9.69 K/UL — SIGNIFICANT CHANGE UP (ref 3.8–10.5)

## 2021-08-30 PROCEDURE — 99285 EMERGENCY DEPT VISIT HI MDM: CPT

## 2021-08-30 PROCEDURE — 99223 1ST HOSP IP/OBS HIGH 75: CPT

## 2021-08-30 PROCEDURE — 71045 X-RAY EXAM CHEST 1 VIEW: CPT | Mod: 26

## 2021-08-30 PROCEDURE — 73610 X-RAY EXAM OF ANKLE: CPT | Mod: 26,LT

## 2021-08-30 PROCEDURE — 74176 CT ABD & PELVIS W/O CONTRAST: CPT | Mod: 26,MA

## 2021-08-30 PROCEDURE — 70450 CT HEAD/BRAIN W/O DYE: CPT | Mod: 26,MA

## 2021-08-30 PROCEDURE — 93010 ELECTROCARDIOGRAM REPORT: CPT

## 2021-08-30 PROCEDURE — 73502 X-RAY EXAM HIP UNI 2-3 VIEWS: CPT | Mod: 26,RT

## 2021-08-30 PROCEDURE — 72125 CT NECK SPINE W/O DYE: CPT | Mod: 26,MA

## 2021-08-30 NOTE — ED PROVIDER NOTE - PELVIS
[FreeTextEntry1] : Patient was reminded to have routine eye exam and dental care.  I gave him prescription to have routine labs done. UNSTABLE

## 2021-08-30 NOTE — ED ADULT NURSE NOTE - OBJECTIVE STATEMENT
Pt brought from home (recently d/c from [facility] 3 days ago) and as per her  he is unable to lift her and care for her at home. Pt is alert and oriented x3. Pt fell yesterday on her right side and now has right sided hip discomfort.

## 2021-08-30 NOTE — ED PROVIDER NOTE - OBJECTIVE STATEMENT
76 y/o F HTN, Rheum Arthritis, neuropathy to RLE, PTSD previously admitted to [facility] for 100 days (insurance ran out) and DC'ed home with home care 3 days ago. As per  unable to move her and care for her. States she is too heavy and a fall risk. Fell yesterday onto her right side, no head trauma, no LOC        PMD- Reyes 76 y/o F HTN, Rheum Arthritis, neuropathy to RLE, PTSD previously admitted to [facility] for 100 days (insurance ran out) and DC'ed home with home care 3 days ago. As per  unable to lift her and care for her. States she is too heavy and a fall risk. Patient fell yesterday onto her right side now c/o right hip pain, left ankle pain and neck pain. Also foul smell to her urine noted.   PMD- Reyes

## 2021-08-30 NOTE — ED PROVIDER NOTE - CLINICAL SUMMARY MEDICAL DECISION MAKING FREE TEXT BOX
76 y/o F HTN, Rheum Arthritis, neuropathy to RLE, PTSD previously admitted to [facility] for 100 days (insurance ran out) and DC'ed home with home care 3 days ago. As per  unable to lift her and care for her. States she is too heavy and a fall risk. Patient fell yesterday onto her right side now c/o right hip pain, left ankle pain and neck pain. Also foul smell to her urine noted.   Plan: Will check labs, xray right hip/pelvis, left ankle, CT head, CT neck, EKG, CXR, check UA, UCX, ADMIT 74 y/o F HTN, Rheum Arthritis, neuropathy to RLE, PTSD previously admitted to [facility] for 100 days (insurance ran out) and DC'ed home with home care 3 days ago. As per  unable to lift her and care for her. States she is too heavy and a fall risk. Patient fell yesterday onto her right side now c/o right hip pain, left ankle pain and neck pain. Also foul smell to her urine noted.   Plan: Will check labs, xray right hip/pelvis, left ankle, CT head, CT neck, EKG, CXR, check UA, UCX, ADMIT  **update: Labs stable. CT head and cervical spine negative. Xray left ankle no acute fracture. Questionable fracture right hip- CT scan pelvis ordered. Admitted to Dr. Jansen 74 y/o F HTN, Rheum Arthritis, neuropathy to RLE, PTSD previously admitted to [facility] for 100 days (insurance ran out) and IL'ed home with home care 3 days ago. As per  unable to lift her and care for her. States she is too heavy and a fall risk. Patient fell yesterday onto her right side now c/o right hip pain, left ankle pain and neck pain. Also foul smell to her urine noted.   Plan: Will check labs, xray right hip/pelvis, left ankle, CT head, CT neck, EKG, CXR, check UA, UCX, ADMIT  **update: Labs stable. CT head and cervical spine negative. Xray left ankle no acute fracture. Questionable fracture right hip- CT scan pelvis ordered. Admitted to Dr. Jansen.

## 2021-08-30 NOTE — ED PROVIDER NOTE - CARE PLAN
1 Principal Discharge DX:	Fall at home  Secondary Diagnosis:	Pain in right hip  Secondary Diagnosis:	Inability to ambulate due to right hip

## 2021-08-30 NOTE — ED PROVIDER NOTE - ATTENDING CONTRIBUTION TO CARE
pt p/w fall last night with R hip pain and L ankle pain.  unable to ambulate. pt was dc'ed home from rehab with home aid but  and aid together unable to care for pt. pt unable to walk, fell yesterday as  unable to support her.  no LOC. no chest pain/sob.    exam:   General: NAD.   HEENT: eyes perrl, head without swelling/tenderness/ discoloration or other signs of trauma  cor: RRR, s1s2, 2+rad pulses.   lungs: ctabl, no resp distress.   abd: soft, nondistended. mild L mid /lower abd ttp. no rebound/guarding  neuro: a&ox3, cn2-12 intact, LAU, 5/5 strength c nl sensation all extremities, nl coordination.   MSK: mild mid C spine ttp. pelvis stable nontender. mild R hip ttp . able to range both hips with pain. L ankle with mild lateral swelling , mildly tender. 2+ dp pulses  Skin: normal, no rash    AP: pt with h/o RA, neuropathy, difficult ambulating and was in rehab for 100days, AR'ed home and fell. home care not adequate. R hip, L ankle pain p fall. head/neck pain. check ct h, cspine, abdpelv. r/o trauma.  xray hip/L ankle.  analgesics. check labs.  likely need admission for placement

## 2021-08-30 NOTE — ED ADULT TRIAGE NOTE - BMI (KG/M2)
Reason For Visit  Chief Complaint   Patient presents with   • Follow-up           History of Present Illness    pt restarted humalog - blood sugars improving  Reviewed mri lumbar spine with pt - lumbar spinal stenosis  C/o rt arm/hand numbness and aching for 2 years.    Review of Systems  Review of Systems   All other systems reviewed and are negative.      Allergy  ALLERGIES:   Allergen Reactions   • Ace Inhibitors PRURITUS     unsure   • Ampicillin RASH     BLISTERS   • Avocado   (Food Or Med) Other (See Comments)     STOMACH CRAMPS   • Azithromycin Other (See Comments)     Facial swelling   • Metformin Other (See Comments)     Can't recall  VOMINTING   • Penicillins RASH     Blisters   • Sulfa Antibiotics RASH     BLISTERS   • Adhesive   (Environmental) RASH and ERYTHEMA     NYLON TAPE       Medications  Current Outpatient Medications   Medication Sig Dispense Refill   • ALPRAZolam (XANAX) 0.5 MG tablet TAKE 1 TABLET BY MOUTH THREE TIMES DAILY AS NEEDED FOR PANIC ATTACKS 90 tablet 0   • ALPRAZolam (XANAX) 0.5 MG tablet 1 pill three times a day as needed for panic attacks 90 tablet 0   • traZODone (DESYREL) 50 MG tablet Take 1 tablet by mouth nightly. 30 tablet 0   • famciclovir (FAMVIR) 250 MG tablet TAKE 1 TABLET BY MOUTH DAILY 90 tablet 1   • Insulin Aspart, w/Niacinamide, (Fiasp FlexTouch) 100 UNIT/ML Solution Pen-injector 0-150 = 2 units 151-200=4 units 201-250=6 units 251-300=8 units 301-350=10 units 10 mL 3   • insulin lispro (HumaLOG) 100 UNIT/ML injectable solution 0-150 = 2 units 151-200=4 units 201-250=6 units 251-300=8 units 301-350=10 units 10 mL 3   • Accu-Chek SmartView test strip TEST BLOOD SUGAR TWICE DAILY AS DIRECTED 200 strip 1   • levothyroxine 50 MCG tablet TAKE 1 TABLET BY MOUTH EVERY DAY 90 tablet 0   • Jardiance 10 MG tablet TAKE 1 TABLET BY MOUTH EVERY DAY 90 tablet 0   • desvenlafaxine (PRISTIQ) 100 MG 24 hr tablet TAKE 1 TABLET BY MOUTH DAILY 90 tablet 0   • Insulin Pen Needle (BD Pen  Needle Edwina U/F) 32G X 4 MM Misc Use as directed once daily 100 each 3   • simvastatin (ZOCOR) 20 MG tablet TAKE 1 TABLET BY MOUTH EVERY NIGHT 90 tablet 0   • Blood Glucose Monitoring Suppl (ACCU-CHEK EDWINA SMARTVIEW) w/Device Kit 1 strip 2 times daily. TEST BLOOD SUGAR TWICE DAILY DX: E11.65 1 kit 0   • cyclobenzaprine (FLEXERIL) 5 MG tablet Take 5 mg by mouth daily as needed for Muscle spasms.     • dronabinol (MARINOL) 2.5 MG capsule daily as needed.      • HYDROmorphone (DILAUDID) 2 MG tablet every 6 hours as needed. 2 TABLETS IN AM AND 2 TABLETS IN EVERING PRN     • LANTUS SOLOSTAR 100 UNIT/ML pen-injector INJECT 34 UNITS AT BEDTIME (Patient taking differently: 36 Units nightly. INJECT 36 UNITS AT BEDTIME) 45 mL 3   • mupirocin (BACTROBAN) 2 % nasal ointment APPLY TO NARES TWICE A DAY FOR 5 DAYS 10 g 0   • triamcinolone (ARISTOCORT) 0.1 % cream Apply topically 2 times daily. (Patient taking differently: Apply topically 2 times daily as needed. ) 30 g 0   • Insulin Syringe 31G X 5/16\" 0.5 ML Misc use as directed     • MAGNESIUM  mg nightly.      • gabapentin (NEURONTIN) 300 MG capsule Take 1 capsule by mouth 4 times daily.      • fentaNYL (DURAGESIC) 25 MCG/HR patch Place 1 patch onto the skin every 72 hours.        No current facility-administered medications for this visit.        Problem List  Patient Active Problem List   Diagnosis   • Depression with anxiety   • Deviated nasal septum   • Diabetic neuropathy (CMS/HCC)   • Fatigue   • Hyperlipidemia   • Hypertension   • Hypothyroidism   • Lumbar degenerative disc disease   • Vitamin D deficiency   • Osteoarthritis of spine with radiculopathy, lumbosacral region   • Uncontrolled diabetes mellitus (CMS/HCC)   • Squamous cell carcinoma of skin   • Hearing loss, left   • Left ear pain   • Gallbladder polyp   • Urge and stress incontinence   • Overweight   • Psoriasis   • Suppurative otitis media of left ear   • Type 2 diabetes mellitus with hyperglycemia,  with long-term current use of insulin (CMS/HCC)   • Cough   • Neuropathy involving both lower extremities   • Smoking 1/2 pack a day or less   • Dry skin   • Infection of wound due to methicillin resistant Staphylococcus aureus (MRSA)   • Skin lesion of left arm   • Insomnia   • Pain, low back   • Elevated blood pressure reading   • Cataract   • Dupuytren's contracture of hand   • Mild major depression (CMS/HCC)   • Insulin long-term use (CMS/HCC)   • Spinal stenosis of lumbar region with neurogenic claudication   • Cervical radiculopathy due to degenerative joint disease of spine       Surgical History  Past Surgical History:   Procedure Laterality Date   • Back surgery      LUMBAR FUSION, FUSION REVISION   • Breast reduction surgery     • Cosmetic abdominoplasty tummy tuck     • Inner ear surgery Left 1990S    4 EAR SURGERIES   • Pr elective surgery     • Spinal cord stimulator implant      X2   • Spinal cord stimulator removal      X1       Family History  Family History   Problem Relation Age of Onset   • Cancer Mother         STOMACH   • Diabetes Father    • Diabetes Brother    • Diabetes Son    • Cancer Maternal Uncle         THROAT   • Diabetes Maternal Uncle    • Heart disease Maternal Grandmother    • Hypertension Maternal Grandmother    • Cancer Maternal Grandfather         throat   • Diabetes Maternal Grandfather    • Diabetes Sister        Social History  Social History     Tobacco Use   • Smoking status: Former Smoker     Packs/day: 0.25     Types: Cigarettes     Quit date: 10/15/2019     Years since quittin.1   • Smokeless tobacco: Never Used   Substance Use Topics   • Alcohol use: No     Frequency: Never   • Drug use: Yes     Types: Cannabinols     Comment: MEDICAL MARIJUANA        Physical Exam  Vitals:    20 1448   BP: 117/68   Pulse: 73   Weight: 69.6 kg (153 lb 6.3 oz)   Height: 5' 4\" (1.626 m)   PainSc: 3-4     Body mass index is 26.33 kg/m².    Physical Exam   Constitutional: She is  oriented to person, place, and time. She appears well-developed and well-nourished.   Pt uncomfortable due to back pain  Pt walks with pain   HENT:   Head: Normocephalic and atraumatic.   Right Ear: Tympanic membrane and external ear normal.   Left Ear: Tympanic membrane and external ear normal.   Nose: Nose normal.   Mouth/Throat: Oropharynx is clear and moist.   Eyes: Pupils are equal, round, and reactive to light. Conjunctivae and EOM are normal. Right eye exhibits no discharge. Left eye exhibits no discharge.   Neck: Normal range of motion. Neck supple.   Cardiovascular: Normal rate, regular rhythm and normal heart sounds.   No murmur heard.  Pulmonary/Chest: Effort normal and breath sounds normal. She exhibits no tenderness.   Abdominal: Soft. Bowel sounds are normal. She exhibits no distension and no mass. There is no abdominal tenderness. There is no guarding.   Musculoskeletal:         General: Tenderness present. No edema.      Comments: Tender over palms of hands   Neurological: She is alert and oriented to person, place, and time. She has normal reflexes. No cranial nerve deficit. Coordination normal.   Skin: Skin is warm and dry. No rash noted.   Psychiatric: She has a normal mood and affect. Her behavior is normal. Thought content normal.   Vitals reviewed.        Assessment  1. Lumbar degenerative disc disease    2. Uncontrolled type 2 diabetes mellitus with hypoglycemia without coma (CMS/HCC)    3. Mild major depression (CMS/HCC)    4. Insulin long-term use (CMS/HCC)    5. Spinal stenosis of lumbar region with neurogenic claudication    6. Cervical radiculopathy due to degenerative joint disease of spine        Plan    Orders Placed This Encounter   • SERVICE TO ENDOCRINOLOGY     F/up with endo  Depression - stable  Has f/up with dr rubi 12/3  bp stable  See ophtho  Pt will consider mri c-spine in a few months    Proper usage and side effects of medications reviewed and discussed.    Patient  education completed on disease process, etiology, and prognosis.  Return to clinic as clinically indicated.  All questions answered and patient verbalized understanding of the conversation and plan.      Brittany Martin MD   43.4

## 2021-08-31 DIAGNOSIS — M79.672 PAIN IN LEFT FOOT: ICD-10-CM

## 2021-08-31 DIAGNOSIS — E03.9 HYPOTHYROIDISM, UNSPECIFIED: ICD-10-CM

## 2021-08-31 DIAGNOSIS — E78.5 HYPERLIPIDEMIA, UNSPECIFIED: ICD-10-CM

## 2021-08-31 DIAGNOSIS — R26.2 DIFFICULTY IN WALKING, NOT ELSEWHERE CLASSIFIED: ICD-10-CM

## 2021-08-31 DIAGNOSIS — G62.9 POLYNEUROPATHY, UNSPECIFIED: ICD-10-CM

## 2021-08-31 DIAGNOSIS — R60.0 LOCALIZED EDEMA: ICD-10-CM

## 2021-08-31 DIAGNOSIS — M25.551 PAIN IN RIGHT HIP: ICD-10-CM

## 2021-08-31 LAB
ALBUMIN SERPL ELPH-MCNC: 3.2 G/DL — LOW (ref 3.3–5)
ALP SERPL-CCNC: 67 U/L — SIGNIFICANT CHANGE UP (ref 40–120)
ALT FLD-CCNC: 29 U/L — SIGNIFICANT CHANGE UP (ref 10–45)
ANION GAP SERPL CALC-SCNC: 11 MMOL/L — SIGNIFICANT CHANGE UP (ref 5–17)
APPEARANCE UR: ABNORMAL
AST SERPL-CCNC: 32 U/L — SIGNIFICANT CHANGE UP (ref 10–40)
BACTERIA # UR AUTO: ABNORMAL /HPF
BASOPHILS # BLD AUTO: 0.05 K/UL — SIGNIFICANT CHANGE UP (ref 0–0.2)
BASOPHILS NFR BLD AUTO: 0.6 % — SIGNIFICANT CHANGE UP (ref 0–2)
BILIRUB SERPL-MCNC: 0.3 MG/DL — SIGNIFICANT CHANGE UP (ref 0.2–1.2)
BILIRUB UR-MCNC: NEGATIVE — SIGNIFICANT CHANGE UP
BUN SERPL-MCNC: 21 MG/DL — SIGNIFICANT CHANGE UP (ref 7–23)
CALCIUM SERPL-MCNC: 9.3 MG/DL — SIGNIFICANT CHANGE UP (ref 8.4–10.5)
CHLORIDE SERPL-SCNC: 108 MMOL/L — SIGNIFICANT CHANGE UP (ref 96–108)
CO2 SERPL-SCNC: 27 MMOL/L — SIGNIFICANT CHANGE UP (ref 22–31)
COLOR SPEC: YELLOW — SIGNIFICANT CHANGE UP
COVID-19 SPIKE DOMAIN AB INTERP: POSITIVE
COVID-19 SPIKE DOMAIN ANTIBODY RESULT: >250 U/ML — HIGH
CREAT SERPL-MCNC: 1.31 MG/DL — HIGH (ref 0.5–1.3)
D DIMER BLD IA.RAPID-MCNC: 156 NG/ML DDU — SIGNIFICANT CHANGE UP
DIFF PNL FLD: ABNORMAL
EOSINOPHIL # BLD AUTO: 0.33 K/UL — SIGNIFICANT CHANGE UP (ref 0–0.5)
EOSINOPHIL NFR BLD AUTO: 4.1 % — SIGNIFICANT CHANGE UP (ref 0–6)
GLUCOSE SERPL-MCNC: 97 MG/DL — SIGNIFICANT CHANGE UP (ref 70–99)
GLUCOSE UR QL: NEGATIVE — SIGNIFICANT CHANGE UP
HCT VFR BLD CALC: 39.3 % — SIGNIFICANT CHANGE UP (ref 34.5–45)
HGB BLD-MCNC: 12.3 G/DL — SIGNIFICANT CHANGE UP (ref 11.5–15.5)
IMM GRANULOCYTES NFR BLD AUTO: 0.2 % — SIGNIFICANT CHANGE UP (ref 0–1.5)
KETONES UR-MCNC: NEGATIVE — SIGNIFICANT CHANGE UP
LEUKOCYTE ESTERASE UR-ACNC: ABNORMAL
LYMPHOCYTES # BLD AUTO: 2.97 K/UL — SIGNIFICANT CHANGE UP (ref 1–3.3)
LYMPHOCYTES # BLD AUTO: 36.7 % — SIGNIFICANT CHANGE UP (ref 13–44)
MCHC RBC-ENTMCNC: 29.1 PG — SIGNIFICANT CHANGE UP (ref 27–34)
MCHC RBC-ENTMCNC: 31.3 GM/DL — LOW (ref 32–36)
MCV RBC AUTO: 93.1 FL — SIGNIFICANT CHANGE UP (ref 80–100)
MONOCYTES # BLD AUTO: 0.7 K/UL — SIGNIFICANT CHANGE UP (ref 0–0.9)
MONOCYTES NFR BLD AUTO: 8.7 % — SIGNIFICANT CHANGE UP (ref 2–14)
NEUTROPHILS # BLD AUTO: 4.02 K/UL — SIGNIFICANT CHANGE UP (ref 1.8–7.4)
NEUTROPHILS NFR BLD AUTO: 49.7 % — SIGNIFICANT CHANGE UP (ref 43–77)
NITRITE UR-MCNC: NEGATIVE — SIGNIFICANT CHANGE UP
NRBC # BLD: 0 /100 WBCS — SIGNIFICANT CHANGE UP (ref 0–0)
PH UR: 5 — SIGNIFICANT CHANGE UP (ref 5–8)
PLATELET # BLD AUTO: 296 K/UL — SIGNIFICANT CHANGE UP (ref 150–400)
POTASSIUM SERPL-MCNC: 4.2 MMOL/L — SIGNIFICANT CHANGE UP (ref 3.5–5.3)
POTASSIUM SERPL-SCNC: 4.2 MMOL/L — SIGNIFICANT CHANGE UP (ref 3.5–5.3)
PROT SERPL-MCNC: 7.2 G/DL — SIGNIFICANT CHANGE UP (ref 6–8.3)
PROT UR-MCNC: 30 MG/DL
RBC # BLD: 4.22 M/UL — SIGNIFICANT CHANGE UP (ref 3.8–5.2)
RBC # FLD: 15.9 % — HIGH (ref 10.3–14.5)
RBC CASTS # UR COMP ASSIST: SIGNIFICANT CHANGE UP /HPF (ref 0–4)
SARS-COV-2 IGG+IGM SERPL QL IA: >250 U/ML — HIGH
SARS-COV-2 IGG+IGM SERPL QL IA: POSITIVE
SODIUM SERPL-SCNC: 146 MMOL/L — HIGH (ref 135–145)
SP GR SPEC: 1.02 — SIGNIFICANT CHANGE UP (ref 1.01–1.02)
TSH SERPL-MCNC: 1.03 UIU/ML — SIGNIFICANT CHANGE UP (ref 0.36–3.74)
UROBILINOGEN FLD QL: NEGATIVE — SIGNIFICANT CHANGE UP
WBC # BLD: 8.09 K/UL — SIGNIFICANT CHANGE UP (ref 3.8–10.5)
WBC # FLD AUTO: 8.09 K/UL — SIGNIFICANT CHANGE UP (ref 3.8–10.5)
WBC UR QL: >50 /HPF (ref 0–5)

## 2021-08-31 RX ORDER — DULOXETINE HYDROCHLORIDE 30 MG/1
20 CAPSULE, DELAYED RELEASE ORAL DAILY
Refills: 0 | Status: DISCONTINUED | OUTPATIENT
Start: 2021-08-31 | End: 2021-09-03

## 2021-08-31 RX ORDER — DEXTROSE MONOHYDRATE, SODIUM CHLORIDE, AND POTASSIUM CHLORIDE 50; .745; 4.5 G/1000ML; G/1000ML; G/1000ML
1000 INJECTION, SOLUTION INTRAVENOUS
Refills: 0 | Status: DISCONTINUED | OUTPATIENT
Start: 2021-08-31 | End: 2021-08-31

## 2021-08-31 RX ORDER — SODIUM CHLORIDE 9 MG/ML
1000 INJECTION, SOLUTION INTRAVENOUS
Refills: 0 | Status: DISCONTINUED | OUTPATIENT
Start: 2021-08-31 | End: 2021-09-02

## 2021-08-31 RX ORDER — ALPRAZOLAM 0.25 MG
0.5 TABLET ORAL
Refills: 0 | Status: DISCONTINUED | OUTPATIENT
Start: 2021-08-31 | End: 2021-09-03

## 2021-08-31 RX ORDER — GABAPENTIN 400 MG/1
100 CAPSULE ORAL THREE TIMES A DAY
Refills: 0 | Status: DISCONTINUED | OUTPATIENT
Start: 2021-08-31 | End: 2021-09-03

## 2021-08-31 RX ORDER — OXYCODONE AND ACETAMINOPHEN 5; 325 MG/1; MG/1
1 TABLET ORAL EVERY 4 HOURS
Refills: 0 | Status: DISCONTINUED | OUTPATIENT
Start: 2021-08-31 | End: 2021-09-03

## 2021-08-31 RX ORDER — LEVOTHYROXINE SODIUM 125 MCG
75 TABLET ORAL DAILY
Refills: 0 | Status: DISCONTINUED | OUTPATIENT
Start: 2021-08-31 | End: 2021-09-03

## 2021-08-31 RX ORDER — ENOXAPARIN SODIUM 100 MG/ML
40 INJECTION SUBCUTANEOUS DAILY
Refills: 0 | Status: DISCONTINUED | OUTPATIENT
Start: 2021-08-31 | End: 2021-09-03

## 2021-08-31 RX ORDER — SIMVASTATIN 20 MG/1
10 TABLET, FILM COATED ORAL AT BEDTIME
Refills: 0 | Status: DISCONTINUED | OUTPATIENT
Start: 2021-08-31 | End: 2021-09-03

## 2021-08-31 RX ADMIN — OXYCODONE AND ACETAMINOPHEN 1 TABLET(S): 5; 325 TABLET ORAL at 11:51

## 2021-08-31 RX ADMIN — OXYCODONE AND ACETAMINOPHEN 1 TABLET(S): 5; 325 TABLET ORAL at 15:41

## 2021-08-31 RX ADMIN — OXYCODONE AND ACETAMINOPHEN 1 TABLET(S): 5; 325 TABLET ORAL at 10:51

## 2021-08-31 RX ADMIN — GABAPENTIN 100 MILLIGRAM(S): 400 CAPSULE ORAL at 21:53

## 2021-08-31 RX ADMIN — OXYCODONE AND ACETAMINOPHEN 1 TABLET(S): 5; 325 TABLET ORAL at 16:37

## 2021-08-31 RX ADMIN — GABAPENTIN 100 MILLIGRAM(S): 400 CAPSULE ORAL at 06:10

## 2021-08-31 RX ADMIN — GABAPENTIN 100 MILLIGRAM(S): 400 CAPSULE ORAL at 13:57

## 2021-08-31 RX ADMIN — DULOXETINE HYDROCHLORIDE 20 MILLIGRAM(S): 30 CAPSULE, DELAYED RELEASE ORAL at 15:41

## 2021-08-31 RX ADMIN — OXYCODONE AND ACETAMINOPHEN 1 TABLET(S): 5; 325 TABLET ORAL at 21:53

## 2021-08-31 RX ADMIN — SIMVASTATIN 10 MILLIGRAM(S): 20 TABLET, FILM COATED ORAL at 21:53

## 2021-08-31 RX ADMIN — ENOXAPARIN SODIUM 40 MILLIGRAM(S): 100 INJECTION SUBCUTANEOUS at 06:10

## 2021-08-31 RX ADMIN — OXYCODONE AND ACETAMINOPHEN 1 TABLET(S): 5; 325 TABLET ORAL at 22:23

## 2021-08-31 RX ADMIN — Medication 75 MICROGRAM(S): at 06:11

## 2021-08-31 NOTE — CONSULT NOTE ADULT - ASSESSMENT
Impression:  76 yo female with h/o rheumatoid arthritis peripheral neuropathy chronic pain anxiety depression and chronic gait disorder.  Recently discharged from [facility] after prolonged stay and unable to be cared for by spouse. Brain CTs and MRI dating back to 2017 reveal ventricular enlargement with differential of NPH vs central atrophy.  Imaging has not significantly changed on my review since 2017.  She appears to have dementia and a multifactorial gait disorder.  R/O psych disorder. Clinical presentation  is nor classic for NPH.     REC: Discussed with her  by phone this AM. He declines w/u for hydrocephalus which would include transfer for a  large volume LP with sedation etc.  He is asking for placement and I would agree.

## 2021-08-31 NOTE — H&P ADULT - NSHPREVIEWOFSYSTEMS_GEN_ALL_CORE
CONSTITUTIONAL: No fever, weight loss, or fatigue  EYES: No eye pain, visual disturbances, or discharge  ENMT:  No difficulty hearing, tinnitus, vertigo; No sinus or throat pain  NECK: No pain or stiffness  RESPIRATORY: No cough, wheezing, chills or hemoptysis; No shortness of breath  CARDIOVASCULAR: No chest pain, palpitations, dizziness, or leg swelling  GASTROINTESTINAL: No abdominal or epigastric pain. No nausea, vomiting, or hematemesis; No diarrhea or constipation. No melena or hematochezia.  GENITOURINARY: No dysuria, frequency, hematuria, or incontinence  NEUROLOGICAL: No headaches, memory loss, loss of strength, numbness, or tremors  SKIN: No itching, burning, rashes, or lesions   LYMPH NODES: No enlarged glands  ENDOCRINE: No heat or cold intolerance; No hair loss  MUSCULOSKELETAL: No joint pain or swelling; No muscle, back, or extremity pain. R hip and L foot pain.   PSYCHIATRIC: No depression, anxiety, mood swings, or difficulty sleeping  HEME/LYMPH: No easy bruising, or bleeding gums  ALLERY AND IMMUNOLOGIC: No hives or eczema

## 2021-08-31 NOTE — CONSULT NOTE ADULT - SUBJECTIVE AND OBJECTIVE BOX
Neurology consult    TAYLOR [id number]     Patient is a 75y old  Female who presents with a chief complaint of s/p fall R hip pain. placement (31 Aug 2021 00:42)      HPI:  75F hx of HLD, hypothyroid, peripheral neuropathy, anxiety s/p D/C from [facility] 3 days ago after spending the requisite time there. However, on discharge home pt has been unable to get off the couch, despite help from  and his friends and unable to care for her daily hygiene or transport her. She had sustained a fall yesterday after another failed attempt to transfer her to the wheelchair. She denied any fevers, chills, cough, SOB, CP, abd pain, dysuria or flank pain. She admits to chronic loose stools about 3x a day that is unchanged. She has some R hip pain and L foot pain after fall. In ED, afebrile, P: 103 BP: 134/81 sat 90-91% on RA.  (31 Aug 2021 00:42)      REVIEW OF SYSTEMS:    Constitutional: No fever, chills, fatigue, weakness  Eyes: no eye pain, visual disturbances, or discharge  ENT:  No difficulty hearing, tinnitus, vertigo; No sinus or throat pain  Neck: No pain or stiffness  Respiratory: No cough, dyspnea, wheezing   Cardiovascular: No chest pain, palpitations,   Gastrointestinal: No abdominal or epigastric pain. No nausea, vomiting  No diarrhea or constipation.   Genitourinary: No dysuria, frequency, hematuria or incontinence  Neurological: see above  Psychiatric: No depression, anxiety, mood swings or difficulty sleeping  Musculoskeletal: Multifocal pain  Skin: No itching, burning, rashes or lesions   Lymph Nodes: No enlarged glands  Endocrine: No heat or cold intolerance; No hair loss, No h/o diabetes or thyroid dysfunction  Allergy and Immunologic: No hives or eczema    MEDICATIONS    ALPRAZolam 0.5 milliGRAM(s) Oral two times a day PRN  DULoxetine 20 milliGRAM(s) Oral daily  enoxaparin Injectable 40 milliGRAM(s) SubCutaneous every 12 hours  gabapentin 100 milliGRAM(s) Oral three times a day  levothyroxine 75 MICROGram(s) Oral daily  oxycodone    5 mG/acetaminophen 325 mG 1 Tablet(s) Oral every 4 hours PRN  simvastatin 10 milliGRAM(s) Oral at bedtime      PMH: PTSD (post-traumatic stress disorder)    Anxiety    High cholesterol    Hypertension    Agoraphobia    Chronic back pain         PSH: No significant past surgical history        Family history:   FAMILY HISTORY:      SOCIAL HISTORY:  No history of tobacco or alcohol use     Allergies    epinephrine (Unknown)  norepinephrine (Unknown)    Intolerances        Height (cm): 152.4 (08-31 @ 01:20)  Weight (kg): 62.9 (08-31 @ 01:20)  BMI (kg/m2): 27.1 (08-31 @ 01:20)    Vital Signs Last 24 Hrs  T(C): 36.2 (31 Aug 2021 04:26), Max: 37.1 (30 Aug 2021 17:55)  T(F): 97.1 (31 Aug 2021 04:26), Max: 98.7 (30 Aug 2021 17:55)  HR: 84 (31 Aug 2021 04:26) (84 - 103)  BP: 119/72 (31 Aug 2021 04:26) (112/76 - 141/86)  BP(mean): --  RR: 15 (31 Aug 2021 04:26) (15 - 16)  SpO2: 96% (31 Aug 2021 04:26) (90% - 96%)      On Neurological Examination:    Head: normocephalic Neck: supple.  Resists bilateral straight leg raising.    Mental Status - Patient is alert, orientation is variable confused inattentive    Cranial Nerves - PERRL, EOMI, VFF, normal V through XII no nystagmus    Motor Exam : Generally weak lowers greater than uppers gives way and uncooperative complaining of pain. No focal  atrophy abnormal movement or dysmetria    Sensory    Intact to light touch and pinprick.  Reduced vibration sense distally.  JPS intact.    Reflexes:  symmetric @ 2+ save ankle jerks 1+ and  plantars downgoing    Gait - not able.                                                          LABS:  CBC Full  -  ( 31 Aug 2021 05:30 )  WBC Count : 8.09 K/uL  RBC Count : 4.22 M/uL  Hemoglobin : 12.3 g/dL  Hematocrit : 39.3 %  Platelet Count - Automated : 296 K/uL  Mean Cell Volume : 93.1 fl  Mean Cell Hemoglobin : 29.1 pg  Mean Cell Hemoglobin Concentration : 31.3 gm/dL  Auto Neutrophil # : 4.02 K/uL  Auto Lymphocyte # : 2.97 K/uL  Auto Monocyte # : 0.70 K/uL  Auto Eosinophil # : 0.33 K/uL  Auto Basophil # : 0.05 K/uL  Auto Neutrophil % : 49.7 %  Auto Lymphocyte % : 36.7 %  Auto Monocyte % : 8.7 %  Auto Eosinophil % : 4.1 %  Auto Basophil % : 0.6 %      08-31    146<H>  |  108  |  21  ----------------------------<  97  4.2   |  27  |  1.31<H>    Ca    9.3      31 Aug 2021 05:30    TPro  7.2  /  Alb  3.2<L>  /  TBili  0.3  /  DBili  x   /  AST  32  /  ALT  29  /  AlkPhos  67  08-31    LIVER FUNCTIONS - ( 31 Aug 2021 05:30 )  Alb: 3.2 g/dL / Pro: 7.2 g/dL / ALK PHOS: 67 U/L / ALT: 29 U/L / AST: 32 U/L / GGT: x           Hemoglobin A1C:                 RADIOLOGY            < from: CT Head No Cont (08.30.21 @ 19:33) >    EXAM:  CT CERVICAL SPINE    EXAM:  CT BRAIN      PROCEDURE DATE:  08/30/2021        INTERPRETATION:  CT HEAD, CT CERVICAL SPINE    INDICATIONS: head trauma s/p fall, No additional history was provided.    CT BRAIN:    TECHNIQUE:  Multiple contiguous axial images were obtained from the skull base to the vertex without the use of intravenous contrast.    COMPARISON EXAMINATION: Head CT and cervical spine CT 4/14/2021    FINDINGS:  Ventricles and sulci: Parenchymal volume loss is present which is commensurate with patient age. Mild ventriculomegaly may be slightly out of proportion to the level of atrophy.  Intra-axial: There are hemispheric white matter areas of low attenuation which are nonspecific but likely related to sequelae of microvascular disease.  No intracranial mass, acute hemorrhage, or significant midline shift is present.  Extra-axial: There is no extra-axial collection.  Visualized sinuses: No air-fluid levels are identified. Clear.  Visualized mastoids:  Clear.  Calvarium:Unremarkable.  Miscellaneous:  None.    Impression: See below    ======================================================================================      CT CERVICAL SPINE:    TECHNIQUE:  Axial images were obtained through the cervical spine usingmultislice helical technique.  Reformatted coronal and sagittal images were performed.    COMPARISON EXAMINATION:  Head CT and cervical spine CT 4/14/2021    FINDINGS:  On the sagittal reformations, there is no prevertebral soft tissue swelling. There is no splaying of the spinous processes.  On the coronal reformations, occipital condyles are normal. Lateral masses of C1 align normally with C2.  On the axial images, no lucent fracture line is identified.    Multilevel degenerative osteoarthritisis present. Findings include marginal osteophytes, uncovertebral spurring, and facet joint space compartment narrowing with subchondral sclerosis and hypertrophic osteophytes at multiple levels. There is multilevel degenerative disc disease. Findingsinclude loss of normal disc space height and endplate sclerosis.    Miscellaneous:  None.    IMPRESSIONS:    Head CT: No CT evidence of acute intracranial hemorrhage.  Ventriculomegaly may be slightly out of proportion to the level of atrophy. Consider NPH.    C-spine CT:  No acute fracture.      --- End of Report ---          < end of copied text >

## 2021-08-31 NOTE — CONSULT NOTE ADULT - CONSULT REASON
Home in care of family.  Rest.  Take your medications as prescribed.  See Celeste Gaitan in the office in 1 to 2 days.  Return to the emergency department right away symptoms worsen/any problems.   s/p fall

## 2021-08-31 NOTE — H&P ADULT - NSHPLABSRESULTS_GEN_ALL_CORE
13.0   9.69  )-----------( 313      ( 30 Aug 2021 18:55 )             40.9       08-30    141  |  105  |  23  ----------------------------<  113<H>  4.7   |  26  |  1.29    Ca    9.4      30 Aug 2021 18:55    TPro  7.9  /  Alb  3.6  /  TBili  0.3  /  DBili  x   /  AST  39  /  ALT  37  /  AlkPhos  70  08-30         LIVER FUNCTIONS - ( 30 Aug 2021 18:55 )  Alb: 3.6 g/dL / Pro: 7.9 g/dL / ALK PHOS: 70 U/L / ALT: 37 U/L / AST: 39 U/L / GGT: x             EKG: personally rev. NSR at 87bpm, low voltage      CXR: wet read personally rev. NAPD, incr markings on R sec to overlying breast tissue.    rad< from: CT Abdomen and Pelvis No Cont (08.30.21 @ 20:43) >    FINDINGS:  Evaluation of the solid organs and vasculature is limited without intravenous contrast.    LOWER CHEST: Linear atelectasis.    LIVER: Small calcification at the dome, unchanged. Few scattered hypodensities too small to characterize.  BILE DUCTS: Normal caliber.  GALLBLADDER: Cholelithiasis.  SPLEEN: Calcified granuloma.  PANCREAS: Within normal limits.  ADRENALS: Within normal limits.  KIDNEYS/URETERS: No hydronephrosis.    BLADDER: Within normal limits.  REPRODUCTIVE ORGANS: Uterus and adnexa within normal limits.    BOWEL: No bowel obstruction. Appendix is normal. Periampullary duodenal diverticulum. Mild colonic diverticulosis.  PERITONEUM: No ascites.  VESSELS: Atherosclerotic changes.  RETROPERITONEUM/LYMPH NODES: No lymphadenopathy.  ABDOMINAL WALL: Mild subcutaneous stranding in the left lateral abdominal wall.  BONES: Degenerative changes.    IMPRESSION:  Study is limited without intravenous contrast. Mild left lateral abdominal wall subcutaneous stranding, may be postraumatic in etiology.    < end of copied text >    < from: CT Head No Cont (08.30.21 @ 19:33) >    IMPRESSIONS:    Head CT: No CT evidence of acute intracranial hemorrhage.  Ventriculomegaly may be slightly out of proportion to the level of atrophy. Consider NPH.    C-spine CT:  No acute fracture.    < end of copied text >

## 2021-08-31 NOTE — PHYSICAL THERAPY INITIAL EVALUATION ADULT - PERTINENT HX OF CURRENT PROBLEM, REHAB EVAL
75F hx of HLD, hypothyroid, peripheral neuropathy, anxiety s/p D/C from [facility] 3 days ago after spending the requisite time there. However, on discharge home pt has been unable to get off the couch, despite help from  and his friends and unable to care for her daily hygiene or transport her. She had sustained a fall yesterday after another failed attempt to transfer her to the wheelchair

## 2021-08-31 NOTE — H&P ADULT - ASSESSMENT
75F hx of HLD, hypothyroid, peripheral neuropathy, anxiety s/p recent D/C from [facility] and pw s/p fall and ambulatory failure with family unable to care for her.     #Ambulatory failure  SW  PT consult  Assess for longterm placement.   check XR of L foot.   +edema in L LE - check doppler of LLE  ?low sats in ED. place on continuous pulse ox overnight.   check d-dimer to be complete.   hx of UTIs, check UA to be complete.   Neuro consult for ?NPH on CT head.     #HLD  cont statin    #Hypothyroid  cont synthroid    #Peripheral neuropathy  cont gabapention and prn percocet     #DVT/GI proph  lovenox , PPI      CAPRINI SCORE [CLOT]    AGE RELATED RISK FACTORS                                                       MOBILITY RELATED FACTORS  [ ] Age 41-60 years                                            (1 Point)                  [ ] Bed rest                                                        (1 Point)  [2 ] Age: 61-74 years                                           (2 Points)                 [ ] Plaster cast                                                   (2 Points)  [ ] Age= 75 years                                              (3 Points)                 [ ] Bed bound for more than 72 hours                 (2 Points)    DISEASE RELATED RISK FACTORS                                               GENDER SPECIFIC FACTORS  [ ] Edema in the lower extremities                       (1 Point)                  [ ] Pregnancy                                                     (1 Point)  [ ] Varicose veins                                               (1 Point)                  [ ] Post-partum < 6 weeks                                   (1 Point)             [ 1] BMI > 25 Kg/m2                                            (1 Point)                  [ ] Hormonal therapy  or oral contraception          (1 Point)                 [ ] Sepsis (in the previous month)                        (1 Point)                  [ ] History of pregnancy complications                 (1 point)  [ ] Pneumonia or serious lung disease                                               [ ] Unexplained or recurrent                     (1 Point)           (in the previous month)                               (1 Point)  [ ] Abnormal pulmonary function test                     (1 Point)                 SURGERY RELATED RISK FACTORS  [ ] Acute myocardial infarction                              (1 Point)                 [ ]  Section                                             (1 Point)  [ ] Congestive heart failure (in the previous month)  (1 Point)               [ ] Minor surgery                                                  (1 Point)   [ ] Inflammatory bowel disease                             (1 Point)                 [ ] Arthroscopic surgery                                        (2 Points)  [ ] Central venous access                                      (2 Points)                [ ] General surgery lasting more than 45 minutes   (2 Points)       [ ] Stroke (in the previous month)                          (5 Points)               [ ] Elective arthroplasty                                         (5 Points)                                                                                                                                               HEMATOLOGY RELATED FACTORS                                                 TRAUMA RELATED RISK FACTORS  [ ] Prior episodes of VTE                                     (3 Points)                [ ] Fracture of the hip, pelvis, or leg                       (5 Points)  [ ] Positive family history for VTE                         (3 Points)                 [ ] Acute spinal cord injury (in the previous month)  (5 Points)  [ ] Prothrombin 70818 A                                     (3 Points)                 [ ] Paralysis  (less than 1 month)                             (5 Points)  [ ] Factor V Leiden                                             (3 Points)                  [ ] Multiple Trauma within 1 month                        (5 Points)  [ ] Lupus anticoagulants                                     (3 Points)                                                           [ ] Anticardiolipin antibodies                               (3 Points)                                                       [ ] High homocysteine in the blood                      (3 Points)                                             [ ] Other congenital or acquired thrombophilia      (3 Points)                                                [ ] Heparin induced thrombocytopenia                  (3 Points)                                          Total Score [   3       ]    Caprini Score 0 - 2:  Low Risk, No VTE Prophylaxis required for most patients, encourage ambulation  Caprini Score 3 - 6:  At Risk, pharmacologic VTE prophylaxis is indicated for most patients (in the absence of a contraindication)  Caprini Score Greater than or = 7:  High Risk, pharmacologic VTE prophylaxis is indicated for most patients (in the absence of a contraindication)

## 2021-08-31 NOTE — H&P ADULT - NSHPPHYSICALEXAM_GEN_ALL_CORE
Vital Signs Last 24 Hrs  T(C): 37 (30 Aug 2021 22:54), Max: 37.1 (30 Aug 2021 17:55)  T(F): 98.6 (30 Aug 2021 22:54), Max: 98.7 (30 Aug 2021 17:55)  HR: 87 (30 Aug 2021 22:54) (87 - 103)  BP: 112/76 (30 Aug 2021 22:54) (112/76 - 134/81)  BP(mean): --  RR: 16 (30 Aug 2021 22:54) (16 - 16)  SpO2: 91% (30 Aug 2021 22:54) (90% - 91%)  Daily Height in cm: 154.94 (30 Aug 2021 17:55)    Daily   CAPILLARY BLOOD GLUCOSE        I&O's Summary      GENERAL: NAD  HEAD:  Normocephalic  EYES: EOMI, PERRLA, conjunctiva and sclera clear  ENMT: No tonsillar erythema, exudates, or enlargement; Moist mucous membranes, No lesions  NECK: Supple, No JVD, no bruit, normal thyroid  NERVOUS SYSTEM:  Alert & Oriented X3, Good concentration; grossly  Motor Strength 5/5 B/L upper and lower extremities; DTRs 2+ intact and symmetric  CHEST/LUNG: Clear to auscultation bilaterally; No rales, rhonchi, wheezing, or rubs  HEART: Regular rate and rhythm; No murmurs, rubs, or gallops  ABDOMEN: Soft, some mild L sided abd tenderness, no visible hematoma or cellulitis.  Nondistended; Bowel sounds present  EXTREMITIES:  2+ Peripheral Pulses, No clubbing, cyanosis, +L foot pedal edema but nontender metatarsal squeeze. FROM in the L ankle.   LYMPH: No lymphadenopathy noted  SKIN: No rashes or lesions

## 2021-08-31 NOTE — H&P ADULT - HISTORY OF PRESENT ILLNESS
75F hx of HLD, hypothyroid, peripheral neuropathy, anxiety s/p D/C from [facility] 3 days ago after spending the requisite time there. However, on discharge home pt has been unable to get off the couch, despite help from  and his friends and unable to care for her daily hygiene or transport her. She had sustained a fall yesterday after another failed attempt to transfer her to the wheelchair. She denied any fevers, chills, cough, SOB, CP, abd pain, dysuria or flank pain. She admits to chronic loose stools about 3x a day that is unchanged. She has some R hip pain and L foot pain after fall. In ED, afebrile, P: 103 BP: 134/81 sat 90-91% on RA.

## 2021-08-31 NOTE — PHYSICAL THERAPY INITIAL EVALUATION ADULT - ADDITIONAL COMMENTS
pt lives in private house w/spouse, 1 aleks w/rail, no stairs in house that pt uses. pt is minimally ambulatory; transfers to wheelchair w/rollator and assist from spouse. pt also has a straight cane and shower chair

## 2021-09-01 LAB
ANION GAP SERPL CALC-SCNC: 7 MMOL/L — SIGNIFICANT CHANGE UP (ref 5–17)
BUN SERPL-MCNC: 16 MG/DL — SIGNIFICANT CHANGE UP (ref 7–23)
CALCIUM SERPL-MCNC: 9.1 MG/DL — SIGNIFICANT CHANGE UP (ref 8.4–10.5)
CHLORIDE SERPL-SCNC: 109 MMOL/L — HIGH (ref 96–108)
CO2 SERPL-SCNC: 26 MMOL/L — SIGNIFICANT CHANGE UP (ref 22–31)
CREAT SERPL-MCNC: 1.19 MG/DL — SIGNIFICANT CHANGE UP (ref 0.5–1.3)
GLUCOSE SERPL-MCNC: 122 MG/DL — HIGH (ref 70–99)
POTASSIUM SERPL-MCNC: 4.1 MMOL/L — SIGNIFICANT CHANGE UP (ref 3.5–5.3)
POTASSIUM SERPL-SCNC: 4.1 MMOL/L — SIGNIFICANT CHANGE UP (ref 3.5–5.3)
SODIUM SERPL-SCNC: 142 MMOL/L — SIGNIFICANT CHANGE UP (ref 135–145)

## 2021-09-01 PROCEDURE — 93971 EXTREMITY STUDY: CPT | Mod: 26,LT

## 2021-09-01 PROCEDURE — 99232 SBSQ HOSP IP/OBS MODERATE 35: CPT

## 2021-09-01 PROCEDURE — 73620 X-RAY EXAM OF FOOT: CPT | Mod: 26,LT

## 2021-09-01 RX ADMIN — DULOXETINE HYDROCHLORIDE 20 MILLIGRAM(S): 30 CAPSULE, DELAYED RELEASE ORAL at 11:41

## 2021-09-01 RX ADMIN — OXYCODONE AND ACETAMINOPHEN 1 TABLET(S): 5; 325 TABLET ORAL at 00:00

## 2021-09-01 RX ADMIN — GABAPENTIN 100 MILLIGRAM(S): 400 CAPSULE ORAL at 05:05

## 2021-09-01 RX ADMIN — ENOXAPARIN SODIUM 40 MILLIGRAM(S): 100 INJECTION SUBCUTANEOUS at 11:41

## 2021-09-01 RX ADMIN — OXYCODONE AND ACETAMINOPHEN 1 TABLET(S): 5; 325 TABLET ORAL at 17:59

## 2021-09-01 RX ADMIN — OXYCODONE AND ACETAMINOPHEN 1 TABLET(S): 5; 325 TABLET ORAL at 21:36

## 2021-09-01 RX ADMIN — OXYCODONE AND ACETAMINOPHEN 1 TABLET(S): 5; 325 TABLET ORAL at 19:25

## 2021-09-01 RX ADMIN — Medication 0.5 MILLIGRAM(S): at 21:36

## 2021-09-01 RX ADMIN — GABAPENTIN 100 MILLIGRAM(S): 400 CAPSULE ORAL at 13:16

## 2021-09-01 RX ADMIN — SIMVASTATIN 10 MILLIGRAM(S): 20 TABLET, FILM COATED ORAL at 21:37

## 2021-09-01 RX ADMIN — GABAPENTIN 100 MILLIGRAM(S): 400 CAPSULE ORAL at 21:36

## 2021-09-01 RX ADMIN — Medication 75 MICROGRAM(S): at 05:05

## 2021-09-01 RX ADMIN — OXYCODONE AND ACETAMINOPHEN 1 TABLET(S): 5; 325 TABLET ORAL at 13:26

## 2021-09-01 RX ADMIN — OXYCODONE AND ACETAMINOPHEN 1 TABLET(S): 5; 325 TABLET ORAL at 22:36

## 2021-09-01 NOTE — PROGRESS NOTE ADULT - SUBJECTIVE AND OBJECTIVE BOX
Patient is a 76y old  Female who presents with a chief complaint of s/p fall R hip pain. placement (31 Aug 2021 07:57)    Patient seen and examined at bedside. No acute overnight events.     ALLERGIES:  epinephrine (Unknown)  norepinephrine (Unknown)    MEDICATIONS  (STANDING):  DULoxetine 20 milliGRAM(s) Oral daily  enoxaparin Injectable 40 milliGRAM(s) SubCutaneous daily  gabapentin 100 milliGRAM(s) Oral three times a day  levothyroxine 75 MICROGram(s) Oral daily  simvastatin 10 milliGRAM(s) Oral at bedtime  sodium chloride 0.45%. 1000 milliLiter(s) (75 mL/Hr) IV Continuous <Continuous>    MEDICATIONS  (PRN):  ALPRAZolam 0.5 milliGRAM(s) Oral two times a day PRN anxiety  oxycodone    5 mG/acetaminophen 325 mG 1 Tablet(s) Oral every 4 hours PRN Moderate Pain (4 - 6)    Vital Signs Last 24 Hrs  T(F): 98.1 (01 Sep 2021 08:25), Max: 99.3 (31 Aug 2021 21:46)  HR: 82 (01 Sep 2021 08:25) (80 - 94)  BP: 106/61 (01 Sep 2021 08:25) (106/61 - 128/89)  RR: 18 (01 Sep 2021 08:25) (16 - 23)  SpO2: 92% (01 Sep 2021 08:25) (92% - 96%)  I&O's Summary    31 Aug 2021 07:01  -  01 Sep 2021 07:00  --------------------------------------------------------  IN: 0 mL / OUT: 600 mL / NET: -600 mL    BMI (kg/m2): 27.1 (21 @ 01:20)    PHYSICAL EXAM:  General: NAD  ENT: MMM, no tonsilar exudate  Neck: Supple, No JVD  Lungs: Clear to auscultation bilaterally, no wheezes. Good air entry bilaterally   Cardio: RRR, S1/S2, No murmurs  Abdomen: Soft, Nontender, Nondistended; Bowel sounds present  Extremities: No calf tenderness, No pitting edema    LABS:                        12.3   8.09  )-----------( 296      ( 31 Aug 2021 05:30 )             39.3           142  |  109  |  16  ----------------------------<  122  4.1   |  26  |  1.19    Ca    9.1      01 Sep 2021 09:42    TPro  7.2  /  Alb  3.2  /  TBili  0.3  /  DBili  x   /  AST  32  /  ALT  29  /  AlkPhos  67       eGFR if African American: 52 mL/min/1.73M2 (21 @ 09:42)  eGFR if Non African American: 45 mL/min/1.73M2 (21 @ 09:42)    TSH 1.033   TSH with FT4 reflex --  Total T3 --    Urinalysis Basic - ( 30 Aug 2021 08:19 )    Color: Yellow / Appearance: very cloudy / S.020 / pH: x  Gluc: x / Ketone: Negative  / Bili: Negative / Urobili: Negative   Blood: x / Protein: 30 mg/dL / Nitrite: Negative   Leuk Esterase: Moderate / RBC: 0-4 /HPF / WBC >50 /HPF   Sq Epi: x / Non Sq Epi: x / Bacteria: Many /HPF    COVID-19 PCR: NotDetec (21 @ 18:55)    RADIOLOGY & ADDITIONAL TESTS:     Care Discussed with Consultants/Other Providers:

## 2021-09-01 NOTE — PROGRESS NOTE ADULT - ASSESSMENT
75F hx of HLD, hypothyroid, peripheral neuropathy, anxiety s/p recent D/C from [facility] and pw s/p fall and ambulatory failure with family unable to care for her.     #Ambulatory failure  SW  PT eval noted - SHIVANI  LLE duplex negative  Infectious w/u negative    #HLD  cont statin    #Hypothyroid  cont synthroid    #Peripheral neuropathy  cont gabapention and prn percocet     #DVT/GI proph  lovenox , PPI    DC to [facility]

## 2021-09-02 LAB
-  AMIKACIN: SIGNIFICANT CHANGE UP
-  AMOXICILLIN/CLAVULANIC ACID: SIGNIFICANT CHANGE UP
-  AMPICILLIN/SULBACTAM: SIGNIFICANT CHANGE UP
-  AMPICILLIN: SIGNIFICANT CHANGE UP
-  AZTREONAM: SIGNIFICANT CHANGE UP
-  CEFAZOLIN: SIGNIFICANT CHANGE UP
-  CEFEPIME: SIGNIFICANT CHANGE UP
-  CEFOTAXIME: SIGNIFICANT CHANGE UP
-  CEFOXITIN: SIGNIFICANT CHANGE UP
-  CEFTAZIDIME: SIGNIFICANT CHANGE UP
-  CEFTRIAXONE: SIGNIFICANT CHANGE UP
-  CEFUROXIME: SIGNIFICANT CHANGE UP
-  CIPROFLOXACIN: SIGNIFICANT CHANGE UP
-  ERTAPENEM: SIGNIFICANT CHANGE UP
-  GENTAMICIN: SIGNIFICANT CHANGE UP
-  IMIPENEM: SIGNIFICANT CHANGE UP
-  LEVOFLOXACIN: SIGNIFICANT CHANGE UP
-  MEROPENEM: SIGNIFICANT CHANGE UP
-  MINOCYCLINE: SIGNIFICANT CHANGE UP
-  NITROFURANTOIN: SIGNIFICANT CHANGE UP
-  PIPERACILLIN/TAZOBACTAM: SIGNIFICANT CHANGE UP
-  TIGECYCLINE: SIGNIFICANT CHANGE UP
-  TOBRAMYCIN: SIGNIFICANT CHANGE UP
-  TRIMETHOPRIM/SULFAMETHOXAZOLE: SIGNIFICANT CHANGE UP
CULTURE RESULTS: SIGNIFICANT CHANGE UP
METHOD TYPE: SIGNIFICANT CHANGE UP
ORGANISM # SPEC MICROSCOPIC CNT: SIGNIFICANT CHANGE UP
ORGANISM # SPEC MICROSCOPIC CNT: SIGNIFICANT CHANGE UP
SARS-COV-2 RNA SPEC QL NAA+PROBE: SIGNIFICANT CHANGE UP
SPECIMEN SOURCE: SIGNIFICANT CHANGE UP

## 2021-09-02 PROCEDURE — 99232 SBSQ HOSP IP/OBS MODERATE 35: CPT

## 2021-09-02 RX ADMIN — OXYCODONE AND ACETAMINOPHEN 1 TABLET(S): 5; 325 TABLET ORAL at 15:00

## 2021-09-02 RX ADMIN — OXYCODONE AND ACETAMINOPHEN 1 TABLET(S): 5; 325 TABLET ORAL at 19:20

## 2021-09-02 RX ADMIN — GABAPENTIN 100 MILLIGRAM(S): 400 CAPSULE ORAL at 05:59

## 2021-09-02 RX ADMIN — ENOXAPARIN SODIUM 40 MILLIGRAM(S): 100 INJECTION SUBCUTANEOUS at 12:21

## 2021-09-02 RX ADMIN — GABAPENTIN 100 MILLIGRAM(S): 400 CAPSULE ORAL at 21:08

## 2021-09-02 RX ADMIN — OXYCODONE AND ACETAMINOPHEN 1 TABLET(S): 5; 325 TABLET ORAL at 14:13

## 2021-09-02 RX ADMIN — SIMVASTATIN 10 MILLIGRAM(S): 20 TABLET, FILM COATED ORAL at 21:08

## 2021-09-02 RX ADMIN — OXYCODONE AND ACETAMINOPHEN 1 TABLET(S): 5; 325 TABLET ORAL at 11:15

## 2021-09-02 RX ADMIN — OXYCODONE AND ACETAMINOPHEN 1 TABLET(S): 5; 325 TABLET ORAL at 10:20

## 2021-09-02 RX ADMIN — Medication 75 MICROGRAM(S): at 05:59

## 2021-09-02 RX ADMIN — Medication 0.5 MILLIGRAM(S): at 21:08

## 2021-09-02 RX ADMIN — OXYCODONE AND ACETAMINOPHEN 1 TABLET(S): 5; 325 TABLET ORAL at 18:28

## 2021-09-02 RX ADMIN — DULOXETINE HYDROCHLORIDE 20 MILLIGRAM(S): 30 CAPSULE, DELAYED RELEASE ORAL at 12:22

## 2021-09-02 RX ADMIN — GABAPENTIN 100 MILLIGRAM(S): 400 CAPSULE ORAL at 14:13

## 2021-09-02 RX ADMIN — OXYCODONE AND ACETAMINOPHEN 1 TABLET(S): 5; 325 TABLET ORAL at 06:59

## 2021-09-02 RX ADMIN — OXYCODONE AND ACETAMINOPHEN 1 TABLET(S): 5; 325 TABLET ORAL at 05:59

## 2021-09-02 NOTE — PROGRESS NOTE ADULT - SUBJECTIVE AND OBJECTIVE BOX
Patient is a 76y old  Female who presents with a chief complaint of s/p fall R hip pain. placement (01 Sep 2021 11:05)    Patient seen and examined at bedside. No acute overnight events. Reports sleeping well. Denies complaints.     ALLERGIES:  epinephrine (Unknown)  norepinephrine (Unknown)    MEDICATIONS  (STANDING):  DULoxetine 20 milliGRAM(s) Oral daily  enoxaparin Injectable 40 milliGRAM(s) SubCutaneous daily  gabapentin 100 milliGRAM(s) Oral three times a day  levothyroxine 75 MICROGram(s) Oral daily  simvastatin 10 milliGRAM(s) Oral at bedtime    MEDICATIONS  (PRN):  ALPRAZolam 0.5 milliGRAM(s) Oral two times a day PRN anxiety  oxycodone    5 mG/acetaminophen 325 mG 1 Tablet(s) Oral every 4 hours PRN Moderate Pain (4 - 6)    Vital Signs Last 24 Hrs  T(F): 97.8 (02 Sep 2021 07:50), Max: 98.9 (01 Sep 2021 19:50)  HR: 72 (02 Sep 2021 07:50) (70 - 91)  BP: 117/53 (02 Sep 2021 07:50) (99/65 - 118/67)  RR: 17 (02 Sep 2021 07:50) (15 - 18)  SpO2: 95% (02 Sep 2021 07:50) (93% - 96%)  I&O's Summary    01 Sep 2021 07:01  -  02 Sep 2021 07:00  --------------------------------------------------------  IN: 200 mL / OUT: 1400 mL / NET: -1200 mL    BMI (kg/m2): 27.1 (08-31-21 @ 01:20)    PHYSICAL EXAM:  General: NAD  ENT: MMM, no tonsilar exudate  Neck: Supple, No JVD  Lungs: Clear to auscultation bilaterally, no wheezes. Good air entry bilaterally   Cardio: RRR, S1/S2, No murmurs  Abdomen: Soft, Nontender, Nondistended; Bowel sounds present  Extremities: No calf tenderness, No pitting edema    LABS:                        12.3   8.09  )-----------( 296      ( 31 Aug 2021 05:30 )             39.3       09-01    142  |  109  |  16  ----------------------------<  122  4.1   |  26  |  1.19    Ca    9.1      01 Sep 2021 09:42    TPro  7.2  /  Alb  3.2  /  TBili  0.3  /  DBili  x   /  AST  32  /  ALT  29  /  AlkPhos  67  08-31     eGFR if African American: 52 mL/min/1.73M2 (09-01-21 @ 09:42)  eGFR if Non African American: 45 mL/min/1.73M2 (09-01-21 @ 09:42)    TSH 1.033   TSH with FT4 reflex --  Total T3 --    Culture - Urine (collected 30 Aug 2021 08:19)  Source: Clean Catch Clean Catch (Midstream)  Preliminary Report (01 Sep 2021 16:23):    >100,000 CFU/ml Gram Negative Rods    COVID-19 PCR: Radhatevitor (08-30-21 @ 18:55)    RADIOLOGY & ADDITIONAL TESTS:     Care Discussed with Consultants/Other Providers:

## 2021-09-02 NOTE — PROGRESS NOTE ADULT - ASSESSMENT
75F hx of HLD, hypothyroid, peripheral neuropathy, anxiety s/p recent D/C from [facility] and pw s/p fall and ambulatory failure with family unable to care for her.     #Ambulatory failure  SW  PT eval noted - SHIVANI  LLE duplex negative  Infectious w/u negative    #HLD  cont statin    #Hypothyroid  cont synthroid    #Peripheral neuropathy  cont gabapention and prn percocet     #DVT/GI proph  lovenox , PPI    SHIVANI placement

## 2021-09-03 ENCOUNTER — TRANSCRIPTION ENCOUNTER (OUTPATIENT)
Age: 76
End: 2021-09-03

## 2021-09-03 VITALS
TEMPERATURE: 98 F | SYSTOLIC BLOOD PRESSURE: 109 MMHG | OXYGEN SATURATION: 95 % | DIASTOLIC BLOOD PRESSURE: 66 MMHG | HEART RATE: 87 BPM | RESPIRATION RATE: 16 BRPM

## 2021-09-03 LAB
ANION GAP SERPL CALC-SCNC: 8 MMOL/L — SIGNIFICANT CHANGE UP (ref 5–17)
BUN SERPL-MCNC: 18 MG/DL — SIGNIFICANT CHANGE UP (ref 7–23)
CALCIUM SERPL-MCNC: 9.4 MG/DL — SIGNIFICANT CHANGE UP (ref 8.4–10.5)
CHLORIDE SERPL-SCNC: 107 MMOL/L — SIGNIFICANT CHANGE UP (ref 96–108)
CO2 SERPL-SCNC: 28 MMOL/L — SIGNIFICANT CHANGE UP (ref 22–31)
CREAT SERPL-MCNC: 1.03 MG/DL — SIGNIFICANT CHANGE UP (ref 0.5–1.3)
GLUCOSE SERPL-MCNC: 96 MG/DL — SIGNIFICANT CHANGE UP (ref 70–99)
HCT VFR BLD CALC: 37.7 % — SIGNIFICANT CHANGE UP (ref 34.5–45)
HGB BLD-MCNC: 12.2 G/DL — SIGNIFICANT CHANGE UP (ref 11.5–15.5)
MCHC RBC-ENTMCNC: 29.6 PG — SIGNIFICANT CHANGE UP (ref 27–34)
MCHC RBC-ENTMCNC: 32.4 GM/DL — SIGNIFICANT CHANGE UP (ref 32–36)
MCV RBC AUTO: 91.5 FL — SIGNIFICANT CHANGE UP (ref 80–100)
NRBC # BLD: 0 /100 WBCS — SIGNIFICANT CHANGE UP (ref 0–0)
PLATELET # BLD AUTO: 272 K/UL — SIGNIFICANT CHANGE UP (ref 150–400)
POTASSIUM SERPL-MCNC: 4.5 MMOL/L — SIGNIFICANT CHANGE UP (ref 3.5–5.3)
POTASSIUM SERPL-SCNC: 4.5 MMOL/L — SIGNIFICANT CHANGE UP (ref 3.5–5.3)
RBC # BLD: 4.12 M/UL — SIGNIFICANT CHANGE UP (ref 3.8–5.2)
RBC # FLD: 15.2 % — HIGH (ref 10.3–14.5)
SODIUM SERPL-SCNC: 143 MMOL/L — SIGNIFICANT CHANGE UP (ref 135–145)
WBC # BLD: 7.84 K/UL — SIGNIFICANT CHANGE UP (ref 3.8–10.5)
WBC # FLD AUTO: 7.84 K/UL — SIGNIFICANT CHANGE UP (ref 3.8–10.5)

## 2021-09-03 PROCEDURE — 71045 X-RAY EXAM CHEST 1 VIEW: CPT

## 2021-09-03 PROCEDURE — 80048 BASIC METABOLIC PNL TOTAL CA: CPT

## 2021-09-03 PROCEDURE — 87077 CULTURE AEROBIC IDENTIFY: CPT

## 2021-09-03 PROCEDURE — 81001 URINALYSIS AUTO W/SCOPE: CPT

## 2021-09-03 PROCEDURE — 87186 SC STD MICRODIL/AGAR DIL: CPT

## 2021-09-03 PROCEDURE — 73502 X-RAY EXAM HIP UNI 2-3 VIEWS: CPT

## 2021-09-03 PROCEDURE — 85025 COMPLETE CBC W/AUTO DIFF WBC: CPT

## 2021-09-03 PROCEDURE — U0003: CPT

## 2021-09-03 PROCEDURE — 87635 SARS-COV-2 COVID-19 AMP PRB: CPT

## 2021-09-03 PROCEDURE — 97162 PT EVAL MOD COMPLEX 30 MIN: CPT

## 2021-09-03 PROCEDURE — 87086 URINE CULTURE/COLONY COUNT: CPT

## 2021-09-03 PROCEDURE — 72125 CT NECK SPINE W/O DYE: CPT | Mod: MA

## 2021-09-03 PROCEDURE — 84443 ASSAY THYROID STIM HORMONE: CPT

## 2021-09-03 PROCEDURE — 93971 EXTREMITY STUDY: CPT

## 2021-09-03 PROCEDURE — 99239 HOSP IP/OBS DSCHRG MGMT >30: CPT

## 2021-09-03 PROCEDURE — 85379 FIBRIN DEGRADATION QUANT: CPT

## 2021-09-03 PROCEDURE — 73610 X-RAY EXAM OF ANKLE: CPT

## 2021-09-03 PROCEDURE — 93005 ELECTROCARDIOGRAM TRACING: CPT

## 2021-09-03 PROCEDURE — 86769 SARS-COV-2 COVID-19 ANTIBODY: CPT

## 2021-09-03 PROCEDURE — 80053 COMPREHEN METABOLIC PANEL: CPT

## 2021-09-03 PROCEDURE — 70450 CT HEAD/BRAIN W/O DYE: CPT | Mod: MA

## 2021-09-03 PROCEDURE — 36415 COLL VENOUS BLD VENIPUNCTURE: CPT

## 2021-09-03 PROCEDURE — U0005: CPT

## 2021-09-03 PROCEDURE — 74176 CT ABD & PELVIS W/O CONTRAST: CPT | Mod: MA

## 2021-09-03 PROCEDURE — 99285 EMERGENCY DEPT VISIT HI MDM: CPT | Mod: 25

## 2021-09-03 PROCEDURE — 85027 COMPLETE CBC AUTOMATED: CPT

## 2021-09-03 PROCEDURE — 73620 X-RAY EXAM OF FOOT: CPT

## 2021-09-03 RX ADMIN — OXYCODONE AND ACETAMINOPHEN 1 TABLET(S): 5; 325 TABLET ORAL at 06:32

## 2021-09-03 RX ADMIN — GABAPENTIN 100 MILLIGRAM(S): 400 CAPSULE ORAL at 13:51

## 2021-09-03 RX ADMIN — OXYCODONE AND ACETAMINOPHEN 1 TABLET(S): 5; 325 TABLET ORAL at 13:50

## 2021-09-03 RX ADMIN — OXYCODONE AND ACETAMINOPHEN 1 TABLET(S): 5; 325 TABLET ORAL at 07:30

## 2021-09-03 RX ADMIN — OXYCODONE AND ACETAMINOPHEN 1 TABLET(S): 5; 325 TABLET ORAL at 14:45

## 2021-09-03 RX ADMIN — ENOXAPARIN SODIUM 40 MILLIGRAM(S): 100 INJECTION SUBCUTANEOUS at 11:10

## 2021-09-03 RX ADMIN — Medication 0.5 MILLIGRAM(S): at 15:23

## 2021-09-03 RX ADMIN — Medication 75 MICROGRAM(S): at 05:12

## 2021-09-03 RX ADMIN — DULOXETINE HYDROCHLORIDE 20 MILLIGRAM(S): 30 CAPSULE, DELAYED RELEASE ORAL at 11:10

## 2021-09-03 RX ADMIN — GABAPENTIN 100 MILLIGRAM(S): 400 CAPSULE ORAL at 05:12

## 2021-09-03 NOTE — DISCHARGE NOTE PROVIDER - NSDCMRMEDTOKEN_GEN_ALL_CORE_FT
ALPRAZolam 0.5 mg oral tablet: orally 2 times a day, As Needed  Cymbalta 20 mg oral delayed release capsule: 1 cap(s) orally 2 times a day  gabapentin 100 mg oral capsule: 1 cap(s) orally 3 times a day  oxycodone-acetaminophen 5 mg-325 mg oral tablet: 1 tab(s) orally every 4 hours MDD:20mg  simvastatin 10 mg oral tablet: 1 tab(s) orally once a day (at bedtime)  Synthroid 75 mcg (0.075 mg) oral tablet: 1 tab(s) orally once a day

## 2021-09-03 NOTE — PROGRESS NOTE ADULT - SUBJECTIVE AND OBJECTIVE BOX
Patient is a 76y old  Female who presents with a chief complaint of s/p fall R hip pain. placement (03 Sep 2021 13:17)      INTERVAL HPI/OVERNIGHT EVENTS: Patient seen and examined at bedside. No overnight events. Reports frequent falls at home. Reports improvement in intermittent, achy hip pain, well controlled with pain medications.     MEDICATIONS  (STANDING):  DULoxetine 20 milliGRAM(s) Oral daily  enoxaparin Injectable 40 milliGRAM(s) SubCutaneous daily  gabapentin 100 milliGRAM(s) Oral three times a day  levothyroxine 75 MICROGram(s) Oral daily  simvastatin 10 milliGRAM(s) Oral at bedtime    MEDICATIONS  (PRN):  ALPRAZolam 0.5 milliGRAM(s) Oral two times a day PRN anxiety  oxycodone    5 mG/acetaminophen 325 mG 1 Tablet(s) Oral every 4 hours PRN Moderate Pain (4 - 6)      Allergies    epinephrine (Unknown)  norepinephrine (Unknown)    Intolerances        REVIEW OF SYSTEMS:  CONSTITUTIONAL: No fever or chills  HEENT:  No headache, no sore throat  RESPIRATORY: No cough, wheezing, or shortness of breath  CARDIOVASCULAR: No chest pain, palpitations  GASTROINTESTINAL: No abd pain, nausea, vomiting, or diarrhea  GENITOURINARY: No dysuria, frequency, or hematuria  NEUROLOGICAL: no focal weakness or dizziness  MUSCULOSKELETAL: intermittent hip pain    Vital Signs Last 24 Hrs  T(C): 36.8 (03 Sep 2021 12:22), Max: 36.8 (02 Sep 2021 15:47)  T(F): 98.2 (03 Sep 2021 12:22), Max: 98.3 (02 Sep 2021 15:47)  HR: 81 (03 Sep 2021 12:22) (52 - 88)  BP: 109/72 (03 Sep 2021 12:22) (105/65 - 122/71)  BP(mean): --  RR: 15 (03 Sep 2021 12:22) (15 - 17)  SpO2: 94% (03 Sep 2021 12:22) (93% - 96%)    PHYSICAL EXAM:  GENERAL: NAD, elderly female  HEENT:  anicteric, moist mucous membranes  CHEST/LUNG: CTA b/l, no rales, wheezes, or rhonchi  HEART:  RRR, S1, S2  ABDOMEN:  BS+, soft, nontender, nondistended  EXTREMITIES: no edema, cyanosis, or calf tenderness  NERVOUS SYSTEM: answers questions and follows commands appropriately    LABS:                        12.2   7.84  )-----------( 272      ( 03 Sep 2021 09:14 )             37.7     09-03    143  |  107  |  18  ----------------------------<  96  4.5   |  28  |  1.03    Ca    9.4      03 Sep 2021 09:14          eGFR if Non African American: 53 mL/min/1.73M2 (09-03-21 @ 09:14)  eGFR if : 62 mL/min/1.73M2 (09-03-21 @ 09:14)            Culture - Urine (collected 30 Aug 2021 08:19)  Source: Clean Catch Clean Catch (Midstream)  Final Report (02 Sep 2021 21:48):    >100,000 CFU/ml Citrobacter freundii complex  Organism: Citrobacter freundii complex (02 Sep 2021 21:48)  Organism: Citrobacter freundii complex (02 Sep 2021 21:48)      -  Amikacin: S <=16      -  Amoxicillin/Clavulanic Acid: R <=8/4      -  Ampicillin: R <=8      -  Ampicillin/Sulbactam: R <=4/2      -  Aztreonam: S <=4      -  Cefazolin: R 16      -  Cefepime: S <=2      -  Cefotaxime: S <=2      -  Cefoxitin: R <=8      -  Ceftazidime: S <=1      -  Ceftriaxone: S <=1      -  Cefuroxime: R <=4      -  Ciprofloxacin: S <=0.25      -  Ertapenem: S <=0.5      -  Gentamicin: S <=2      -  Imipenem: S <=1      -  Levofloxacin: S <=0.5      -  Meropenem: S <=1      -  Minocycline: S <=4      -  Nitrofurantoin: S <=32      -  Piperacillin/Tazobactam: S <=8      -  Tigecycline: S <=2      -  Tobramycin: S <=2      -  Trimethoprim/Sulfamethoxazole: S <=0.5/9.5      Method Type: DARIO      COVID-19 PCR: NotDetec (09-02-21 @ 10:10)  COVID-19 PCR: NotDetec (08-30-21 @ 18:55)        Culture - Urine (collected 08-30-21 @ 08:19)  Source: Clean Catch Clean Catch (Midstream)  Final Report (09-02-21 @ 21:48):    >100,000 CFU/ml Citrobacter freundii complex  Organism: Citrobacter freundii complex (09-02-21 @ 21:48)  Organism: Citrobacter freundii complex (09-02-21 @ 21:48)      -  Amikacin: S <=16      -  Amoxicillin/Clavulanic Acid: R <=8/4      -  Ampicillin: R <=8      -  Ampicillin/Sulbactam: R <=4/2      -  Aztreonam: S <=4      -  Cefazolin: R 16      -  Cefepime: S <=2      -  Cefotaxime: S <=2      -  Cefoxitin: R <=8      -  Ceftazidime: S <=1      -  Ceftriaxone: S <=1      -  Cefuroxime: R <=4      -  Ciprofloxacin: S <=0.25      -  Ertapenem: S <=0.5      -  Gentamicin: S <=2      -  Imipenem: S <=1      -  Levofloxacin: S <=0.5      -  Meropenem: S <=1      -  Minocycline: S <=4      -  Nitrofurantoin: S <=32      -  Piperacillin/Tazobactam: S <=8      -  Tigecycline: S <=2      -  Tobramycin: S <=2      -  Trimethoprim/Sulfamethoxazole: S <=0.5/9.5      Method Type: DARIO        RADIOLOGY & ADDITIONAL TESTS: Personally reviewed.     Consultant(s) Notes Reviewed:  [x] YES  [ ] NO   Discussed with RENEE/JOANNA, RN     Patient is a 76y old  Female who presents with a chief complaint of s/p fall R hip pain. placement (03 Sep 2021 13:17)      INTERVAL HPI/OVERNIGHT EVENTS: Patient seen and examined at bedside. No overnight events. Reports frequent falls at home. Reports improvement in intermittent, achy hip pain, well controlled with pain medications.     MEDICATIONS  (STANDING):  DULoxetine 20 milliGRAM(s) Oral daily  enoxaparin Injectable 40 milliGRAM(s) SubCutaneous daily  gabapentin 100 milliGRAM(s) Oral three times a day  levothyroxine 75 MICROGram(s) Oral daily  simvastatin 10 milliGRAM(s) Oral at bedtime    MEDICATIONS  (PRN):  ALPRAZolam 0.5 milliGRAM(s) Oral two times a day PRN anxiety  oxycodone    5 mG/acetaminophen 325 mG 1 Tablet(s) Oral every 4 hours PRN Moderate Pain (4 - 6)      Allergies    epinephrine (Unknown)  norepinephrine (Unknown)    Intolerances        REVIEW OF SYSTEMS:  CONSTITUTIONAL: No fever or chills  HEENT:  No headache, no sore throat  RESPIRATORY: No cough, wheezing, or shortness of breath  CARDIOVASCULAR: No chest pain, palpitations  GASTROINTESTINAL: No abd pain, nausea, vomiting, or diarrhea  GENITOURINARY: No dysuria, frequency, or hematuria  NEUROLOGICAL: no focal weakness or dizziness  MUSCULOSKELETAL: intermittent hip pain    Vital Signs Last 24 Hrs  T(C): 36.8 (03 Sep 2021 12:22), Max: 36.8 (02 Sep 2021 15:47)  T(F): 98.2 (03 Sep 2021 12:22), Max: 98.3 (02 Sep 2021 15:47)  HR: 81 (03 Sep 2021 12:22) (52 - 88)  BP: 109/72 (03 Sep 2021 12:22) (105/65 - 122/71)  BP(mean): --  RR: 15 (03 Sep 2021 12:22) (15 - 17)  SpO2: 94% (03 Sep 2021 12:22) (93% - 96%)    PHYSICAL EXAM:  GENERAL: NAD, elderly female  HEENT:  anicteric, moist mucous membranes  CHEST/LUNG: CTA b/l, no rales, wheezes, or rhonchi  HEART:  RRR, S1, S2  ABDOMEN:  BS+, soft, nontender, nondistended  EXTREMITIES: no edema, cyanosis, or calf tenderness  NERVOUS SYSTEM: answers questions and follows commands appropriately, moves all extremities     LABS:                        12.2   7.84  )-----------( 272      ( 03 Sep 2021 09:14 )             37.7     09-03    143  |  107  |  18  ----------------------------<  96  4.5   |  28  |  1.03    Ca    9.4      03 Sep 2021 09:14          eGFR if Non African American: 53 mL/min/1.73M2 (09-03-21 @ 09:14)  eGFR if : 62 mL/min/1.73M2 (09-03-21 @ 09:14)            Culture - Urine (collected 30 Aug 2021 08:19)  Source: Clean Catch Clean Catch (Midstream)  Final Report (02 Sep 2021 21:48):    >100,000 CFU/ml Citrobacter freundii complex  Organism: Citrobacter freundii complex (02 Sep 2021 21:48)  Organism: Citrobacter freundii complex (02 Sep 2021 21:48)      -  Amikacin: S <=16      -  Amoxicillin/Clavulanic Acid: R <=8/4      -  Ampicillin: R <=8      -  Ampicillin/Sulbactam: R <=4/2      -  Aztreonam: S <=4      -  Cefazolin: R 16      -  Cefepime: S <=2      -  Cefotaxime: S <=2      -  Cefoxitin: R <=8      -  Ceftazidime: S <=1      -  Ceftriaxone: S <=1      -  Cefuroxime: R <=4      -  Ciprofloxacin: S <=0.25      -  Ertapenem: S <=0.5      -  Gentamicin: S <=2      -  Imipenem: S <=1      -  Levofloxacin: S <=0.5      -  Meropenem: S <=1      -  Minocycline: S <=4      -  Nitrofurantoin: S <=32      -  Piperacillin/Tazobactam: S <=8      -  Tigecycline: S <=2      -  Tobramycin: S <=2      -  Trimethoprim/Sulfamethoxazole: S <=0.5/9.5      Method Type: DARIO      COVID-19 PCR: NotDetec (09-02-21 @ 10:10)  COVID-19 PCR: NotDetec (08-30-21 @ 18:55)        Culture - Urine (collected 08-30-21 @ 08:19)  Source: Clean Catch Clean Catch (Midstream)  Final Report (09-02-21 @ 21:48):    >100,000 CFU/ml Citrobacter freundii complex  Organism: Citrobacter freundii complex (09-02-21 @ 21:48)  Organism: Citrobacter freundii complex (09-02-21 @ 21:48)      -  Amikacin: S <=16      -  Amoxicillin/Clavulanic Acid: R <=8/4      -  Ampicillin: R <=8      -  Ampicillin/Sulbactam: R <=4/2      -  Aztreonam: S <=4      -  Cefazolin: R 16      -  Cefepime: S <=2      -  Cefotaxime: S <=2      -  Cefoxitin: R <=8      -  Ceftazidime: S <=1      -  Ceftriaxone: S <=1      -  Cefuroxime: R <=4      -  Ciprofloxacin: S <=0.25      -  Ertapenem: S <=0.5      -  Gentamicin: S <=2      -  Imipenem: S <=1      -  Levofloxacin: S <=0.5      -  Meropenem: S <=1      -  Minocycline: S <=4      -  Nitrofurantoin: S <=32      -  Piperacillin/Tazobactam: S <=8      -  Tigecycline: S <=2      -  Tobramycin: S <=2      -  Trimethoprim/Sulfamethoxazole: S <=0.5/9.5      Method Type: DRAIO        RADIOLOGY & ADDITIONAL TESTS: Personally reviewed.     Consultant(s) Notes Reviewed:  [x] YES  [ ] NO   Discussed with SW/JOANNA, RN

## 2021-09-03 NOTE — PROGRESS NOTE ADULT - ASSESSMENT
76F hx of HLD, hypothyroid, peripheral neuropathy, anxiety s/p recent D/C from [facility] and pw s/p fall and ambulatory failure with family unable to care for her.     #Fall   -PT consult recommending [facility] v long term care  -Pain control    #HLD  #Hypothyroidism   #Peripheral neuropathy   #Anxiety  -Chronic, continue home medications: Xanax, Cymbalta, Neurontin, Synthroid, Zocor    #Prophylactic Measure  DVT prophylaxis: Lovenox    Discussed with  Dylan [phone] aware and in agreement with above. DC to [facility] L[facility] today.  76F hx of HLD, hypothyroid, peripheral neuropathy, anxiety s/p recent D/C from [facility] and pw s/p fall and ambulatory failure with family unable to care for her.     #Fall   -PT consult recommending [facility] v long term care. Accepted to [facility]  -Pain control    #HLD  #Hypothyroidism   #Peripheral neuropathy   #Anxiety  -Chronic, continue home medications: Xanax, Cymbalta, Neurontin, Synthroid, Zocor    #Prophylactic Measure  DVT prophylaxis: Lovenox    Discussed with  Dylan [phone] aware and in agreement with above. DC to [facility] today.

## 2021-09-03 NOTE — DISCHARGE NOTE PROVIDER - NSDCCPCAREPLAN_GEN_ALL_CORE_FT
PRINCIPAL DISCHARGE DIAGNOSIS  Diagnosis: Fall at home  Assessment and Plan of Treatment: You were admitted for multiple falls.   -You are accepted to long term placement at [facility].   -Continue therapy and get stronger there  -Follow up with your primary care physician within the week

## 2021-09-03 NOTE — DISCHARGE NOTE PROVIDER - CARE PROVIDER_API CALL
Reyes, John A (MD)  Internal Medicine  [address], [address]  [place] [address]  Phone: [phone]  Fax: [phone]  Follow Up Time:

## 2021-09-03 NOTE — DISCHARGE NOTE NURSING/CASE MANAGEMENT/SOCIAL WORK - PATIENT PORTAL LINK FT
You can access the FollowMyHealth Patient Portal offered by [facility] by registering at the following website: [email or web address]. By joining [facility]’s FollowMyHealth portal, you will also be able to view your health information using other applications (apps) compatible with our system.
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
## 2021-09-03 NOTE — DISCHARGE NOTE NURSING/CASE MANAGEMENT/SOCIAL WORK - NSDCVIVACCINE_GEN_ALL_CORE_FT
Tdap; 14-Apr-2021 15:25; Chantelle Meehan (RN); [employer]; [id number] (Exp. Date: 21-Jul-2022); IntraMuscular; Deltoid Right.; 0.5 milliLiter(s); VIS (VIS Published: 09-May-2013, VIS Presented: 14-Apr-2021);

## 2021-09-07 ENCOUNTER — NON-APPOINTMENT (OUTPATIENT)
Age: 76
End: 2021-09-07

## 2021-10-07 DIAGNOSIS — F32.A DEPRESSION, UNSPECIFIED: ICD-10-CM

## 2021-10-07 DIAGNOSIS — R21 RASH AND OTHER NONSPECIFIC SKIN ERUPTION: ICD-10-CM

## 2021-10-07 DIAGNOSIS — R52 PAIN, UNSPECIFIED: ICD-10-CM

## 2021-10-07 DIAGNOSIS — E78.5 HYPERLIPIDEMIA, UNSPECIFIED: ICD-10-CM

## 2021-10-07 RX ORDER — SIMVASTATIN 10 MG/1
10 TABLET, FILM COATED ORAL
Qty: 90 | Refills: 3 | Status: ACTIVE | COMMUNITY
Start: 2021-10-07

## 2021-10-07 RX ORDER — DULOXETINE HYDROCHLORIDE 20 MG/1
20 CAPSULE, DELAYED RELEASE PELLETS ORAL
Refills: 0 | Status: DISCONTINUED | COMMUNITY
Start: 2021-10-07 | End: 2021-10-07

## 2021-10-07 RX ORDER — MICONAZOLE NITRATE 20 MG/G
2 POWDER TOPICAL TWICE DAILY
Qty: 1 | Refills: 1 | Status: ACTIVE | COMMUNITY
Start: 2021-10-07 | End: 1900-01-01

## 2021-10-27 ENCOUNTER — INPATIENT (INPATIENT)
Facility: HOSPITAL | Age: 76
LOS: 2 days | Discharge: HOME | DRG: 690 | End: 2021-10-30
Attending: STUDENT IN AN ORGANIZED HEALTH CARE EDUCATION/TRAINING PROGRAM | Admitting: STUDENT IN AN ORGANIZED HEALTH CARE EDUCATION/TRAINING PROGRAM
Payer: MEDICARE

## 2021-10-27 VITALS
HEIGHT: 60 IN | TEMPERATURE: 97 F | SYSTOLIC BLOOD PRESSURE: 138 MMHG | RESPIRATION RATE: 17 BRPM | HEART RATE: 101 BPM | DIASTOLIC BLOOD PRESSURE: 89 MMHG | WEIGHT: 134.92 LBS | OXYGEN SATURATION: 95 %

## 2021-10-27 DIAGNOSIS — N39.0 URINARY TRACT INFECTION, SITE NOT SPECIFIED: ICD-10-CM

## 2021-10-27 LAB
ALBUMIN SERPL ELPH-MCNC: 3.5 G/DL — SIGNIFICANT CHANGE UP (ref 3.3–5)
ALP SERPL-CCNC: 77 U/L — SIGNIFICANT CHANGE UP (ref 40–120)
ALT FLD-CCNC: 25 U/L — SIGNIFICANT CHANGE UP (ref 10–45)
ANION GAP SERPL CALC-SCNC: 8 MMOL/L — SIGNIFICANT CHANGE UP (ref 5–17)
APPEARANCE UR: ABNORMAL
AST SERPL-CCNC: 23 U/L — SIGNIFICANT CHANGE UP (ref 10–40)
BASOPHILS # BLD AUTO: 0.06 K/UL — SIGNIFICANT CHANGE UP (ref 0–0.2)
BASOPHILS NFR BLD AUTO: 0.6 % — SIGNIFICANT CHANGE UP (ref 0–2)
BILIRUB SERPL-MCNC: 0.3 MG/DL — SIGNIFICANT CHANGE UP (ref 0.2–1.2)
BILIRUB UR-MCNC: NEGATIVE — SIGNIFICANT CHANGE UP
BUN SERPL-MCNC: 13 MG/DL — SIGNIFICANT CHANGE UP (ref 7–23)
CALCIUM SERPL-MCNC: 9.6 MG/DL — SIGNIFICANT CHANGE UP (ref 8.4–10.5)
CHLORIDE SERPL-SCNC: 107 MMOL/L — SIGNIFICANT CHANGE UP (ref 96–108)
CO2 SERPL-SCNC: 25 MMOL/L — SIGNIFICANT CHANGE UP (ref 22–31)
COLOR SPEC: YELLOW — SIGNIFICANT CHANGE UP
CREAT SERPL-MCNC: 1.06 MG/DL — SIGNIFICANT CHANGE UP (ref 0.5–1.3)
DIFF PNL FLD: ABNORMAL
EOSINOPHIL # BLD AUTO: 0.08 K/UL — SIGNIFICANT CHANGE UP (ref 0–0.5)
EOSINOPHIL NFR BLD AUTO: 0.8 % — SIGNIFICANT CHANGE UP (ref 0–6)
GLUCOSE SERPL-MCNC: 113 MG/DL — HIGH (ref 70–99)
GLUCOSE UR QL: NEGATIVE — SIGNIFICANT CHANGE UP
HCT VFR BLD CALC: 43.3 % — SIGNIFICANT CHANGE UP (ref 34.5–45)
HGB BLD-MCNC: 13.8 G/DL — SIGNIFICANT CHANGE UP (ref 11.5–15.5)
IMM GRANULOCYTES NFR BLD AUTO: 0.3 % — SIGNIFICANT CHANGE UP (ref 0–1.5)
KETONES UR-MCNC: NEGATIVE — SIGNIFICANT CHANGE UP
LACTATE SERPL-SCNC: 1.8 MMOL/L — SIGNIFICANT CHANGE UP (ref 0.7–2)
LEUKOCYTE ESTERASE UR-ACNC: ABNORMAL
LYMPHOCYTES # BLD AUTO: 2.25 K/UL — SIGNIFICANT CHANGE UP (ref 1–3.3)
LYMPHOCYTES # BLD AUTO: 22 % — SIGNIFICANT CHANGE UP (ref 13–44)
MCHC RBC-ENTMCNC: 28.9 PG — SIGNIFICANT CHANGE UP (ref 27–34)
MCHC RBC-ENTMCNC: 31.9 GM/DL — LOW (ref 32–36)
MCV RBC AUTO: 90.8 FL — SIGNIFICANT CHANGE UP (ref 80–100)
MONOCYTES # BLD AUTO: 0.55 K/UL — SIGNIFICANT CHANGE UP (ref 0–0.9)
MONOCYTES NFR BLD AUTO: 5.4 % — SIGNIFICANT CHANGE UP (ref 2–14)
NEUTROPHILS # BLD AUTO: 7.25 K/UL — SIGNIFICANT CHANGE UP (ref 1.8–7.4)
NEUTROPHILS NFR BLD AUTO: 70.9 % — SIGNIFICANT CHANGE UP (ref 43–77)
NITRITE UR-MCNC: NEGATIVE — SIGNIFICANT CHANGE UP
NRBC # BLD: 0 /100 WBCS — SIGNIFICANT CHANGE UP (ref 0–0)
PH UR: 6 — SIGNIFICANT CHANGE UP (ref 5–8)
PLATELET # BLD AUTO: 308 K/UL — SIGNIFICANT CHANGE UP (ref 150–400)
POTASSIUM SERPL-MCNC: 4.4 MMOL/L — SIGNIFICANT CHANGE UP (ref 3.5–5.3)
POTASSIUM SERPL-SCNC: 4.4 MMOL/L — SIGNIFICANT CHANGE UP (ref 3.5–5.3)
PROT SERPL-MCNC: 7.9 G/DL — SIGNIFICANT CHANGE UP (ref 6–8.3)
PROT UR-MCNC: 100
RBC # BLD: 4.77 M/UL — SIGNIFICANT CHANGE UP (ref 3.8–5.2)
RBC # FLD: 13.9 % — SIGNIFICANT CHANGE UP (ref 10.3–14.5)
SARS-COV-2 RNA SPEC QL NAA+PROBE: SIGNIFICANT CHANGE UP
SODIUM SERPL-SCNC: 140 MMOL/L — SIGNIFICANT CHANGE UP (ref 135–145)
SP GR SPEC: 1.01 — SIGNIFICANT CHANGE UP (ref 1.01–1.02)
UROBILINOGEN FLD QL: NEGATIVE — SIGNIFICANT CHANGE UP
WBC # BLD: 10.22 K/UL — SIGNIFICANT CHANGE UP (ref 3.8–10.5)
WBC # FLD AUTO: 10.22 K/UL — SIGNIFICANT CHANGE UP (ref 3.8–10.5)

## 2021-10-27 PROCEDURE — 93010 ELECTROCARDIOGRAM REPORT: CPT

## 2021-10-27 PROCEDURE — 71045 X-RAY EXAM CHEST 1 VIEW: CPT | Mod: 26

## 2021-10-27 PROCEDURE — 99406 BEHAV CHNG SMOKING 3-10 MIN: CPT

## 2021-10-27 PROCEDURE — 99223 1ST HOSP IP/OBS HIGH 75: CPT

## 2021-10-27 PROCEDURE — 99285 EMERGENCY DEPT VISIT HI MDM: CPT

## 2021-10-27 RX ORDER — SODIUM CHLORIDE 9 MG/ML
1900 INJECTION INTRAMUSCULAR; INTRAVENOUS; SUBCUTANEOUS ONCE
Refills: 0 | Status: COMPLETED | OUTPATIENT
Start: 2021-10-27 | End: 2021-10-27

## 2021-10-27 RX ORDER — GABAPENTIN 400 MG/1
100 CAPSULE ORAL THREE TIMES A DAY
Refills: 0 | Status: DISCONTINUED | OUTPATIENT
Start: 2021-10-27 | End: 2021-10-28

## 2021-10-27 RX ORDER — OXYCODONE AND ACETAMINOPHEN 5; 325 MG/1; MG/1
1 TABLET ORAL EVERY 12 HOURS
Refills: 0 | Status: DISCONTINUED | OUTPATIENT
Start: 2021-10-27 | End: 2021-10-28

## 2021-10-27 RX ORDER — ACETAMINOPHEN 500 MG
650 TABLET ORAL EVERY 6 HOURS
Refills: 0 | Status: DISCONTINUED | OUTPATIENT
Start: 2021-10-27 | End: 2021-10-30

## 2021-10-27 RX ORDER — DULOXETINE HYDROCHLORIDE 30 MG/1
20 CAPSULE, DELAYED RELEASE ORAL
Refills: 0 | Status: DISCONTINUED | OUTPATIENT
Start: 2021-10-27 | End: 2021-10-30

## 2021-10-27 RX ORDER — ALPRAZOLAM 0.25 MG
0 TABLET ORAL
Qty: 0 | Refills: 0 | DISCHARGE

## 2021-10-27 RX ORDER — CEFTRIAXONE 500 MG/1
1000 INJECTION, POWDER, FOR SOLUTION INTRAMUSCULAR; INTRAVENOUS ONCE
Refills: 0 | Status: COMPLETED | OUTPATIENT
Start: 2021-10-27 | End: 2021-10-27

## 2021-10-27 RX ORDER — NICOTINE POLACRILEX 2 MG
1 GUM BUCCAL DAILY
Refills: 0 | Status: DISCONTINUED | OUTPATIENT
Start: 2021-10-27 | End: 2021-10-30

## 2021-10-27 RX ORDER — LEVOTHYROXINE SODIUM 125 MCG
75 TABLET ORAL DAILY
Refills: 0 | Status: DISCONTINUED | OUTPATIENT
Start: 2021-10-27 | End: 2021-10-30

## 2021-10-27 RX ORDER — ALPRAZOLAM 0.25 MG
0.5 TABLET ORAL DAILY
Refills: 0 | Status: DISCONTINUED | OUTPATIENT
Start: 2021-10-27 | End: 2021-10-28

## 2021-10-27 RX ORDER — ENOXAPARIN SODIUM 100 MG/ML
40 INJECTION SUBCUTANEOUS DAILY
Refills: 0 | Status: DISCONTINUED | OUTPATIENT
Start: 2021-10-27 | End: 2021-10-30

## 2021-10-27 RX ORDER — LANOLIN ALCOHOL/MO/W.PET/CERES
3 CREAM (GRAM) TOPICAL AT BEDTIME
Refills: 0 | Status: DISCONTINUED | OUTPATIENT
Start: 2021-10-27 | End: 2021-10-28

## 2021-10-27 RX ORDER — CEFTRIAXONE 500 MG/1
1000 INJECTION, POWDER, FOR SOLUTION INTRAMUSCULAR; INTRAVENOUS EVERY 24 HOURS
Refills: 0 | Status: COMPLETED | OUTPATIENT
Start: 2021-10-27 | End: 2021-10-30

## 2021-10-27 RX ORDER — ONDANSETRON 8 MG/1
4 TABLET, FILM COATED ORAL EVERY 8 HOURS
Refills: 0 | Status: DISCONTINUED | OUTPATIENT
Start: 2021-10-27 | End: 2021-10-30

## 2021-10-27 RX ORDER — SIMVASTATIN 20 MG/1
10 TABLET, FILM COATED ORAL AT BEDTIME
Refills: 0 | Status: DISCONTINUED | OUTPATIENT
Start: 2021-10-27 | End: 2021-10-30

## 2021-10-27 RX ADMIN — SIMVASTATIN 10 MILLIGRAM(S): 20 TABLET, FILM COATED ORAL at 21:47

## 2021-10-27 RX ADMIN — CEFTRIAXONE 1000 MILLIGRAM(S): 500 INJECTION, POWDER, FOR SOLUTION INTRAMUSCULAR; INTRAVENOUS at 15:00

## 2021-10-27 RX ADMIN — CEFTRIAXONE 100 MILLIGRAM(S): 500 INJECTION, POWDER, FOR SOLUTION INTRAMUSCULAR; INTRAVENOUS at 14:31

## 2021-10-27 RX ADMIN — DULOXETINE HYDROCHLORIDE 20 MILLIGRAM(S): 30 CAPSULE, DELAYED RELEASE ORAL at 21:49

## 2021-10-27 RX ADMIN — Medication 650 MILLIGRAM(S): at 21:46

## 2021-10-27 RX ADMIN — GABAPENTIN 100 MILLIGRAM(S): 400 CAPSULE ORAL at 21:47

## 2021-10-27 RX ADMIN — Medication 650 MILLIGRAM(S): at 22:46

## 2021-10-27 RX ADMIN — SODIUM CHLORIDE 1900 MILLILITER(S): 9 INJECTION INTRAMUSCULAR; INTRAVENOUS; SUBCUTANEOUS at 14:10

## 2021-10-27 RX ADMIN — Medication 3 MILLIGRAM(S): at 21:47

## 2021-10-27 RX ADMIN — SODIUM CHLORIDE 1900 MILLILITER(S): 9 INJECTION INTRAMUSCULAR; INTRAVENOUS; SUBCUTANEOUS at 16:00

## 2021-10-27 RX ADMIN — OXYCODONE AND ACETAMINOPHEN 1 TABLET(S): 5; 325 TABLET ORAL at 17:40

## 2021-10-27 NOTE — H&P ADULT - NSHPPHYSICALEXAM_GEN_ALL_CORE
T(C): 37.9 (10-27-21 @ 14:02), Max: 37.9 (10-27-21 @ 14:02)  HR: 97 (10-27-21 @ 14:30) (97 - 101)  BP: 102/46 (10-27-21 @ 14:30) (102/46 - 138/89)  RR: 17 (10-27-21 @ 14:30) (17 - 17)  SpO2: 99% (10-27-21 @ 14:30) (95% - 99%)    GENERAL: NAD, elderly  EYES: sclera clear, no exudates  ENMT: oropharynx clear without erythema, no exudates, moist mucous membranes  NECK: supple, soft, no thyromegaly noted  LUNGS: good air entry bilaterally, clear to auscultation, symmetric breath sounds, no wheezing or rhonchi appreciated  HEART: soft S1/S2, regular rate and rhythm, no murmurs noted, trace lower extremity edema  GASTROINTESTINAL: abdomen is soft, nontender, nondistended, normoactive bowel sounds, no palpable masses  INTEGUMENT: good skin turgor, no lesions noted  MUSCULOSKELETAL: no clubbing or cyanosis, no obvious deformity  NEUROLOGIC: awake, alert, oriented x3, 3/5 strength in all extremities, no obvious sensory deficits  PSYCHIATRIC: mood is good, affect is congruent, linear and logical thought process
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
## 2021-10-27 NOTE — ED ADULT NURSE NOTE - NSFALLRSKINDICTYPE_ED_ALL_ED
[Follow-Up Visit] : a follow-up visit for [Father] : father [FreeTextEntry2] : Beta thal major Need for Mobility Assisted Device
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
## 2021-10-27 NOTE — H&P ADULT - NSHPREVIEWOFSYSTEMS_GEN_ALL_CORE
CONSTITUTIONAL: denies fever, chills, admits to fatigue, weakness  HEENT: denies blurred vision, sore throat  CARDIOVASCULAR: denies chest pain, chest pressure, palpitations  RESPIRATORY: denies shortness of breath, sputum production  GASTROINTESTINAL: denies nausea, vomiting, diarrhea, abdominal pain  GENITOURINARY: denies dysuria, discharge, admits to urinary frequency   NEUROLOGICAL: denies numbness, headache, focal weakness  MUSCULOSKELETAL: denies new joint pain, muscle aches  HEMATOLOGIC: denies gross bleeding, bruising  PSYCHIATRIC: denies recent changes in anxiety, depression  ENDOCRINOLOGIC: denies sweating, cold or heat intolerance

## 2021-10-27 NOTE — H&P ADULT - NSHPSOCIALHISTORY_GEN_ALL_CORE
Lives at home with daughter and   Has HHA 7hours a day, uses wheelchair and walker at baseline, able to perform some ADLs on her own  Denies etoh, smokes about 1/2-1PPD, denies illicit drug use

## 2021-10-27 NOTE — ED ADULT NURSE NOTE - NSFALLRSKPASTHIST_ED_ALL_ED
Problem: Communication  Goal: The ability to communicate needs accurately and effectively will improve  Outcome: PROGRESSING AS EXPECTED     Problem: Safety  Goal: Will remain free from injury  Outcome: PROGRESSING AS EXPECTED     Problem: Infection  Goal: Will remain free from infection  Outcome: PROGRESSING AS EXPECTED     Problem: Knowledge Deficit  Goal: Knowledge of disease process/condition, treatment plan, diagnostic tests, and medications will improve  Outcome: PROGRESSING AS EXPECTED     Problem: Skin Integrity  Goal: Risk for impaired skin integrity will decrease  Outcome: PROGRESSING AS EXPECTED      yes

## 2021-10-28 DIAGNOSIS — F17.200 NICOTINE DEPENDENCE, UNSPECIFIED, UNCOMPLICATED: ICD-10-CM

## 2021-10-28 DIAGNOSIS — M06.9 RHEUMATOID ARTHRITIS, UNSPECIFIED: ICD-10-CM

## 2021-10-28 DIAGNOSIS — N39.0 URINARY TRACT INFECTION, SITE NOT SPECIFIED: ICD-10-CM

## 2021-10-28 DIAGNOSIS — E03.9 HYPOTHYROIDISM, UNSPECIFIED: ICD-10-CM

## 2021-10-28 DIAGNOSIS — E78.5 HYPERLIPIDEMIA, UNSPECIFIED: ICD-10-CM

## 2021-10-28 DIAGNOSIS — G62.9 POLYNEUROPATHY, UNSPECIFIED: ICD-10-CM

## 2021-10-28 DIAGNOSIS — F41.9 ANXIETY DISORDER, UNSPECIFIED: ICD-10-CM

## 2021-10-28 LAB
ANION GAP SERPL CALC-SCNC: 11 MMOL/L — SIGNIFICANT CHANGE UP (ref 5–17)
BASOPHILS # BLD AUTO: 0.04 K/UL — SIGNIFICANT CHANGE UP (ref 0–0.2)
BASOPHILS NFR BLD AUTO: 0.6 % — SIGNIFICANT CHANGE UP (ref 0–2)
BUN SERPL-MCNC: 11 MG/DL — SIGNIFICANT CHANGE UP (ref 7–23)
CALCIUM SERPL-MCNC: 9.4 MG/DL — SIGNIFICANT CHANGE UP (ref 8.4–10.5)
CHLORIDE SERPL-SCNC: 113 MMOL/L — HIGH (ref 96–108)
CO2 SERPL-SCNC: 22 MMOL/L — SIGNIFICANT CHANGE UP (ref 22–31)
COVID-19 SPIKE DOMAIN AB INTERP: POSITIVE
COVID-19 SPIKE DOMAIN ANTIBODY RESULT: >250 U/ML — HIGH
CREAT SERPL-MCNC: 0.91 MG/DL — SIGNIFICANT CHANGE UP (ref 0.5–1.3)
EOSINOPHIL # BLD AUTO: 0.23 K/UL — SIGNIFICANT CHANGE UP (ref 0–0.5)
EOSINOPHIL NFR BLD AUTO: 3.3 % — SIGNIFICANT CHANGE UP (ref 0–6)
GLUCOSE SERPL-MCNC: 106 MG/DL — HIGH (ref 70–99)
HCT VFR BLD CALC: 39.1 % — SIGNIFICANT CHANGE UP (ref 34.5–45)
HGB BLD-MCNC: 12.4 G/DL — SIGNIFICANT CHANGE UP (ref 11.5–15.5)
IMM GRANULOCYTES NFR BLD AUTO: 0.1 % — SIGNIFICANT CHANGE UP (ref 0–1.5)
LYMPHOCYTES # BLD AUTO: 2.52 K/UL — SIGNIFICANT CHANGE UP (ref 1–3.3)
LYMPHOCYTES # BLD AUTO: 36.3 % — SIGNIFICANT CHANGE UP (ref 13–44)
MCHC RBC-ENTMCNC: 28.7 PG — SIGNIFICANT CHANGE UP (ref 27–34)
MCHC RBC-ENTMCNC: 31.7 GM/DL — LOW (ref 32–36)
MCV RBC AUTO: 90.5 FL — SIGNIFICANT CHANGE UP (ref 80–100)
MONOCYTES # BLD AUTO: 0.54 K/UL — SIGNIFICANT CHANGE UP (ref 0–0.9)
MONOCYTES NFR BLD AUTO: 7.8 % — SIGNIFICANT CHANGE UP (ref 2–14)
NEUTROPHILS # BLD AUTO: 3.6 K/UL — SIGNIFICANT CHANGE UP (ref 1.8–7.4)
NEUTROPHILS NFR BLD AUTO: 51.9 % — SIGNIFICANT CHANGE UP (ref 43–77)
NRBC # BLD: 0 /100 WBCS — SIGNIFICANT CHANGE UP (ref 0–0)
PLATELET # BLD AUTO: 257 K/UL — SIGNIFICANT CHANGE UP (ref 150–400)
POTASSIUM SERPL-MCNC: 3.9 MMOL/L — SIGNIFICANT CHANGE UP (ref 3.5–5.3)
POTASSIUM SERPL-SCNC: 3.9 MMOL/L — SIGNIFICANT CHANGE UP (ref 3.5–5.3)
RBC # BLD: 4.32 M/UL — SIGNIFICANT CHANGE UP (ref 3.8–5.2)
RBC # FLD: 14.1 % — SIGNIFICANT CHANGE UP (ref 10.3–14.5)
SARS-COV-2 IGG+IGM SERPL QL IA: >250 U/ML — HIGH
SARS-COV-2 IGG+IGM SERPL QL IA: POSITIVE
SODIUM SERPL-SCNC: 146 MMOL/L — HIGH (ref 135–145)
WBC # BLD: 6.94 K/UL — SIGNIFICANT CHANGE UP (ref 3.8–10.5)
WBC # FLD AUTO: 6.94 K/UL — SIGNIFICANT CHANGE UP (ref 3.8–10.5)

## 2021-10-28 PROCEDURE — 99232 SBSQ HOSP IP/OBS MODERATE 35: CPT

## 2021-10-28 RX ORDER — LANOLIN ALCOHOL/MO/W.PET/CERES
3 CREAM (GRAM) TOPICAL AT BEDTIME
Refills: 0 | Status: DISCONTINUED | OUTPATIENT
Start: 2021-10-28 | End: 2021-10-30

## 2021-10-28 RX ORDER — GABAPENTIN 400 MG/1
100 CAPSULE ORAL THREE TIMES A DAY
Refills: 0 | Status: DISCONTINUED | OUTPATIENT
Start: 2021-10-28 | End: 2021-10-30

## 2021-10-28 RX ORDER — ALPRAZOLAM 0.25 MG
0.5 TABLET ORAL DAILY
Refills: 0 | Status: DISCONTINUED | OUTPATIENT
Start: 2021-10-28 | End: 2021-10-30

## 2021-10-28 RX ORDER — OXYCODONE AND ACETAMINOPHEN 5; 325 MG/1; MG/1
1 TABLET ORAL EVERY 12 HOURS
Refills: 0 | Status: DISCONTINUED | OUTPATIENT
Start: 2021-10-28 | End: 2021-10-30

## 2021-10-28 RX ADMIN — Medication 650 MILLIGRAM(S): at 20:58

## 2021-10-28 RX ADMIN — DULOXETINE HYDROCHLORIDE 20 MILLIGRAM(S): 30 CAPSULE, DELAYED RELEASE ORAL at 05:58

## 2021-10-28 RX ADMIN — Medication 3 MILLIGRAM(S): at 22:16

## 2021-10-28 RX ADMIN — GABAPENTIN 100 MILLIGRAM(S): 400 CAPSULE ORAL at 06:01

## 2021-10-28 RX ADMIN — DULOXETINE HYDROCHLORIDE 20 MILLIGRAM(S): 30 CAPSULE, DELAYED RELEASE ORAL at 17:36

## 2021-10-28 RX ADMIN — CEFTRIAXONE 100 MILLIGRAM(S): 500 INJECTION, POWDER, FOR SOLUTION INTRAMUSCULAR; INTRAVENOUS at 15:18

## 2021-10-28 RX ADMIN — GABAPENTIN 100 MILLIGRAM(S): 400 CAPSULE ORAL at 15:18

## 2021-10-28 RX ADMIN — OXYCODONE AND ACETAMINOPHEN 1 TABLET(S): 5; 325 TABLET ORAL at 11:30

## 2021-10-28 RX ADMIN — ENOXAPARIN SODIUM 40 MILLIGRAM(S): 100 INJECTION SUBCUTANEOUS at 15:17

## 2021-10-28 RX ADMIN — SIMVASTATIN 10 MILLIGRAM(S): 20 TABLET, FILM COATED ORAL at 22:16

## 2021-10-28 RX ADMIN — GABAPENTIN 100 MILLIGRAM(S): 400 CAPSULE ORAL at 22:16

## 2021-10-28 RX ADMIN — Medication 650 MILLIGRAM(S): at 19:58

## 2021-10-28 RX ADMIN — Medication 75 MICROGRAM(S): at 05:59

## 2021-10-28 NOTE — DIETITIAN INITIAL EVALUATION ADULT. - PERTINENT MEDS FT
MEDICATIONS  (STANDING):  cefTRIAXone   IVPB 1000 milliGRAM(s) IV Intermittent every 24 hours  DULoxetine 20 milliGRAM(s) Oral two times a day  enoxaparin Injectable 40 milliGRAM(s) SubCutaneous daily  gabapentin 100 milliGRAM(s) Oral three times a day  levothyroxine 75 MICROGram(s) Oral daily  nicotine - 21 mG/24Hr(s) Patch 1 patch Transdermal daily  simvastatin 10 milliGRAM(s) Oral at bedtime    MEDICATIONS  (PRN):  acetaminophen     Tablet .. 650 milliGRAM(s) Oral every 6 hours PRN Temp greater or equal to 38C (100.4F), Mild Pain (1 - 3)  ALPRAZolam 0.5 milliGRAM(s) Oral daily PRN anxiety  melatonin 3 milliGRAM(s) Oral at bedtime PRN Insomnia  ondansetron Injectable 4 milliGRAM(s) IV Push every 8 hours PRN Nausea and/or Vomiting  oxycodone    5 mG/acetaminophen 325 mG 1 Tablet(s) Oral every 12 hours PRN Severe Pain (7 - 10)
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
## 2021-10-28 NOTE — DIETITIAN INITIAL EVALUATION ADULT. - OTHER INFO
Pt admitted for UTI and reports back pain from pressure ulcers. PMHX of HLD, peripheral Neuropathy, hypothyroidism, HTN. Stage 2 pressure ulcer on left and right buttocks, and left elbow. Pt reports fair appetite since hospitalization and consumes about 50% of meals. No chewing or swallowing difficulties reported. Pt could not remember last BM. No GI distress reported. Pt requested multivitamin after explanation of increased protein and vitamins for pressure ulcer.

## 2021-10-28 NOTE — DIETITIAN INITIAL EVALUATION ADULT. - ORAL INTAKE PTA/DIET HISTORY
Pt reports fair appetite at home and does not follow any specific diet. 24 hour recall proves pt is consuming high amounts of salt and saturated fats ([employer] 3x/week). Pt usually skips lunch. Pt lives with  and daughter but reports no one cooks and  does the grocery shopping. Pt denies having HTN. Pt could not remember UBW but denies any noticeable changes. Pt reports being lactose intolerant but can tolerate milk.

## 2021-10-28 NOTE — DIETITIAN INITIAL EVALUATION ADULT. - PERTINENT LABORATORY DATA
Date: 28 Oct 2021 06:30  Hemoglobin 12.4   Hematocrit 39.1     Date: 10-28  Sodium 146<H>  Potassium 3.9  Glucose Serum 106<H>  BUN 11  Creatinine 1.06
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
## 2021-10-28 NOTE — DIETITIAN INITIAL EVALUATION ADULT. - SIGNS/SYMPTOMS
as evidenced by diet recall and intake of foods high in salt and sat. fat as evidenced stage 2 pressure ulcers.

## 2021-10-28 NOTE — PROGRESS NOTE ADULT - SUBJECTIVE AND OBJECTIVE BOX
Patient is a 76y old  Female who presents with a chief complaint of UTI (27 Oct 2021 16:04)      Patient seen and examined at bedside.    ALLERGIES:  epinephrine (Unknown)  norepinephrine (Unknown)    MEDICATIONS  (STANDING):  cefTRIAXone   IVPB 1000 milliGRAM(s) IV Intermittent every 24 hours  DULoxetine 20 milliGRAM(s) Oral two times a day  enoxaparin Injectable 40 milliGRAM(s) SubCutaneous daily  gabapentin 100 milliGRAM(s) Oral three times a day  levothyroxine 75 MICROGram(s) Oral daily  nicotine - 21 mG/24Hr(s) Patch 1 patch Transdermal daily  simvastatin 10 milliGRAM(s) Oral at bedtime    MEDICATIONS  (PRN):  acetaminophen     Tablet .. 650 milliGRAM(s) Oral every 6 hours PRN Temp greater or equal to 38C (100.4F), Mild Pain (1 - 3)  ALPRAZolam 0.5 milliGRAM(s) Oral daily PRN anxiety  melatonin 3 milliGRAM(s) Oral at bedtime PRN Insomnia  ondansetron Injectable 4 milliGRAM(s) IV Push every 8 hours PRN Nausea and/or Vomiting  oxycodone    5 mG/acetaminophen 325 mG 1 Tablet(s) Oral every 12 hours PRN Severe Pain (7 - 10)    Vital Signs Last 24 Hrs  T(F): 97.7 (28 Oct 2021 04:47), Max: 100.3 (27 Oct 2021 14:02)  HR: 68 (28 Oct 2021 04:47) (68 - 101)  BP: 125/78 (28 Oct 2021 04:47) (102/46 - 138/89)  RR: 17 (28 Oct 2021 04:47) (17 - 19)  SpO2: 96% (28 Oct 2021 04:47) (95% - 99%)  I&O's Summary    27 Oct 2021 07:01  -  28 Oct 2021 07:00  --------------------------------------------------------  IN: 0 mL / OUT: 650 mL / NET: -650 mL      PHYSICAL EXAM:  General: NAD, A/O x 3  ENT: MMM  Neck: Supple, No JVD  Lungs: Clear to auscultation bilaterally, Non labored breathing   Cardio: RRR, S1/S2, No murmurs  Abdomen: Soft, Nontender, Nondistended; Bowel sounds present  Extremities: No calf tenderness, No pitting edema    LABS:                        13.8   10.22 )-----------( 308      ( 27 Oct 2021 14:00 )             43.3     10-27    140  |  107  |  13  ----------------------------<  113  4.4   |  25  |  1.06    Ca    9.6      27 Oct 2021 14:00    TPro  7.9  /  Alb  3.5  /  TBili  0.3  /  DBili  x   /  AST  23  /  ALT  25  /  AlkPhos  77  10-27    eGFR if Non African American: 52 mL/min/1.73M2 (10-27-21 @ 14:00)  eGFR if African American: 60 mL/min/1.73M2 (10-27-21 @ 14:00)      Lactate, Blood: 1.8 mmol/L (10-27 @ 14:00)                          Urinalysis Basic - ( 27 Oct 2021 14:07 )    Color: Yellow / Appearance: very cloudy / S.015 / pH: x  Gluc: x / Ketone: Negative  / Bili: Negative / Urobili: Negative   Blood: x / Protein: 100 / Nitrite: Negative   Leuk Esterase: Moderate / RBC: 11-25 /HPF / WBC >50 /HPF   Sq Epi: x / Non Sq Epi: Neg.-Few / Bacteria: Many /HPF        COVID-19 PCR: NotDetec (10-27-21 @ 14:00)    RADIOLOGY & ADDITIONAL TESTS:    Care Discussed with Consultants/Other Providers:    Patient is a 76y old  Female who presents with a chief complaint of UTI (27 Oct 2021 16:04)      Patient seen and examined at bedside. pt without complaints   had a restful night,     ALLERGIES:  epinephrine (Unknown)  norepinephrine (Unknown)    MEDICATIONS  (STANDING):  cefTRIAXone   IVPB 1000 milliGRAM(s) IV Intermittent every 24 hours  DULoxetine 20 milliGRAM(s) Oral two times a day  enoxaparin Injectable 40 milliGRAM(s) SubCutaneous daily  gabapentin 100 milliGRAM(s) Oral three times a day  levothyroxine 75 MICROGram(s) Oral daily  nicotine - 21 mG/24Hr(s) Patch 1 patch Transdermal daily  simvastatin 10 milliGRAM(s) Oral at bedtime    MEDICATIONS  (PRN):  acetaminophen     Tablet .. 650 milliGRAM(s) Oral every 6 hours PRN Temp greater or equal to 38C (100.4F), Mild Pain (1 - 3)  ALPRAZolam 0.5 milliGRAM(s) Oral daily PRN anxiety  melatonin 3 milliGRAM(s) Oral at bedtime PRN Insomnia  ondansetron Injectable 4 milliGRAM(s) IV Push every 8 hours PRN Nausea and/or Vomiting  oxycodone    5 mG/acetaminophen 325 mG 1 Tablet(s) Oral every 12 hours PRN Severe Pain (7 - 10)    Vital Signs Last 24 Hrs  T(F): 97.7 (28 Oct 2021 04:47), Max: 100.3 (27 Oct 2021 14:02)  HR: 68 (28 Oct 2021 04:47) (68 - 101)  BP: 125/78 (28 Oct 2021 04:47) (102/46 - 138/89)  RR: 17 (28 Oct 2021 04:47) (17 - 19)  SpO2: 96% (28 Oct 2021 04:47) (95% - 99%)  I&O's Summary    27 Oct 2021 07:01  -  28 Oct 2021 07:00  --------------------------------------------------------  IN: 0 mL / OUT: 650 mL / NET: -650 mL      PHYSICAL EXAM:  General: 75 y/o female in  NAD, A/O x 3  ENT: MMM  Neck: Supple, No JVD  Lungs: Clear to auscultation bilaterally, Non labored breathing   Cardio: RRR, S1/S2, No murmurs  Abdomen: Soft, Nontender, Nondistended; Bowel sounds present  Extremities: No calf tenderness, No pitting edema    LABS:                        13.8   10.22 )-----------( 308      ( 27 Oct 2021 14:00 )             43.3     10-27    140  |  107  |  13  ----------------------------<  113  4.4   |  25  |  1.06    Ca    9.6      27 Oct 2021 14:00    TPro  7.9  /  Alb  3.5  /  TBili  0.3  /  DBili  x   /  AST  23  /  ALT  25  /  AlkPhos  77  10-27    eGFR if Non African American: 52 mL/min/1.73M2 (10-27-21 @ 14:00)  eGFR if African American: 60 mL/min/1.73M2 (10-27-21 @ 14:00)      Lactate, Blood: 1.8 mmol/L (10-27 @ 14:00)                          Urinalysis Basic - ( 27 Oct 2021 14:07 )    Color: Yellow / Appearance: very cloudy / S.015 / pH: x  Gluc: x / Ketone: Negative  / Bili: Negative / Urobili: Negative   Blood: x / Protein: 100 / Nitrite: Negative   Leuk Esterase: Moderate / RBC: 11-25 /HPF / WBC >50 /HPF   Sq Epi: x / Non Sq Epi: Neg.-Few / Bacteria: Many /HPF        COVID-19 PCR: NotDetec (10-27-21 @ 14:00)    RADIOLOGY & ADDITIONAL TESTS:    Care Discussed with Consultants/Other Providers:    Patient is a 76y old  Female who presents with a chief complaint of UTI (27 Oct 2021 16:04)      Patient seen and examined at bedside. pt without complaints   had a restful night,   Pt ADLS at home pt non ambulatory relying on wheel chair and walker.  Pt has been this way for over a year.      ALLERGIES:  epinephrine (Unknown)  norepinephrine (Unknown)    MEDICATIONS  (STANDING):  cefTRIAXone   IVPB 1000 milliGRAM(s) IV Intermittent every 24 hours  DULoxetine 20 milliGRAM(s) Oral two times a day  enoxaparin Injectable 40 milliGRAM(s) SubCutaneous daily  gabapentin 100 milliGRAM(s) Oral three times a day  levothyroxine 75 MICROGram(s) Oral daily  nicotine - 21 mG/24Hr(s) Patch 1 patch Transdermal daily  simvastatin 10 milliGRAM(s) Oral at bedtime    MEDICATIONS  (PRN):  acetaminophen     Tablet .. 650 milliGRAM(s) Oral every 6 hours PRN Temp greater or equal to 38C (100.4F), Mild Pain (1 - 3)  ALPRAZolam 0.5 milliGRAM(s) Oral daily PRN anxiety  melatonin 3 milliGRAM(s) Oral at bedtime PRN Insomnia  ondansetron Injectable 4 milliGRAM(s) IV Push every 8 hours PRN Nausea and/or Vomiting  oxycodone    5 mG/acetaminophen 325 mG 1 Tablet(s) Oral every 12 hours PRN Severe Pain (7 - 10)    Vital Signs Last 24 Hrs  T(F): 97.7 (28 Oct 2021 04:47), Max: 100.3 (27 Oct 2021 14:02)  HR: 68 (28 Oct 2021 04:47) (68 - 101)  BP: 125/78 (28 Oct 2021 04:47) (102/46 - 138/89)  RR: 17 (28 Oct 2021 04:47) (17 - 19)  SpO2: 96% (28 Oct 2021 04:47) (95% - 99%)  I&O's Summary    27 Oct 2021 07:01  -  28 Oct 2021 07:00  --------------------------------------------------------  IN: 0 mL / OUT: 650 mL / NET: -650 mL      PHYSICAL EXAM:  General: 75 y/o female in  NAD, A/O x 3  ENT: MMM  Neck: Supple, No JVD  Lungs: Clear to auscultation bilaterally, Non labored breathing   Cardio: RRR, S1/S2, No murmurs  Abdomen: Soft, Nontender, Nondistended; Bowel sounds present  Extremities: No calf tenderness, No pitting edema    LABS:                        13.8   10.22 )-----------( 308      ( 27 Oct 2021 14:00 )             43.3     10-27    140  |  107  |  13  ----------------------------<  113  4.4   |  25  |  1.06    Ca    9.6      27 Oct 2021 14:00    TPro  7.9  /  Alb  3.5  /  TBili  0.3  /  DBili  x   /  AST  23  /  ALT  25  /  AlkPhos  77  10-27    eGFR if Non African American: 52 mL/min/1.73M2 (10-27-21 @ 14:00)  eGFR if African American: 60 mL/min/1.73M2 (10-27-21 @ 14:00)      Lactate, Blood: 1.8 mmol/L (10-27 @ 14:00)                          Urinalysis Basic - ( 27 Oct 2021 14:07 )    Color: Yellow / Appearance: very cloudy / S.015 / pH: x  Gluc: x / Ketone: Negative  / Bili: Negative / Urobili: Negative   Blood: x / Protein: 100 / Nitrite: Negative   Leuk Esterase: Moderate / RBC: 11-25 /HPF / WBC >50 /HPF   Sq Epi: x / Non Sq Epi: Neg.-Few / Bacteria: Many /HPF        COVID-19 PCR: NotDetec (10-27-21 @ 14:00)    RADIOLOGY & ADDITIONAL TESTS:    Care Discussed with Consultants/Other Providers:

## 2021-10-29 PROCEDURE — 99232 SBSQ HOSP IP/OBS MODERATE 35: CPT

## 2021-10-29 RX ADMIN — GABAPENTIN 100 MILLIGRAM(S): 400 CAPSULE ORAL at 21:38

## 2021-10-29 RX ADMIN — SIMVASTATIN 10 MILLIGRAM(S): 20 TABLET, FILM COATED ORAL at 21:38

## 2021-10-29 RX ADMIN — DULOXETINE HYDROCHLORIDE 20 MILLIGRAM(S): 30 CAPSULE, DELAYED RELEASE ORAL at 05:43

## 2021-10-29 RX ADMIN — DULOXETINE HYDROCHLORIDE 20 MILLIGRAM(S): 30 CAPSULE, DELAYED RELEASE ORAL at 17:19

## 2021-10-29 RX ADMIN — OXYCODONE AND ACETAMINOPHEN 1 TABLET(S): 5; 325 TABLET ORAL at 06:49

## 2021-10-29 RX ADMIN — Medication 75 MICROGRAM(S): at 05:43

## 2021-10-29 RX ADMIN — CEFTRIAXONE 100 MILLIGRAM(S): 500 INJECTION, POWDER, FOR SOLUTION INTRAMUSCULAR; INTRAVENOUS at 14:41

## 2021-10-29 RX ADMIN — Medication 0.5 MILLIGRAM(S): at 10:53

## 2021-10-29 RX ADMIN — OXYCODONE AND ACETAMINOPHEN 1 TABLET(S): 5; 325 TABLET ORAL at 17:20

## 2021-10-29 RX ADMIN — GABAPENTIN 100 MILLIGRAM(S): 400 CAPSULE ORAL at 05:43

## 2021-10-29 RX ADMIN — OXYCODONE AND ACETAMINOPHEN 1 TABLET(S): 5; 325 TABLET ORAL at 05:43

## 2021-10-29 RX ADMIN — GABAPENTIN 100 MILLIGRAM(S): 400 CAPSULE ORAL at 14:40

## 2021-10-29 RX ADMIN — ENOXAPARIN SODIUM 40 MILLIGRAM(S): 100 INJECTION SUBCUTANEOUS at 14:40

## 2021-10-29 NOTE — PHYSICAL THERAPY INITIAL EVALUATION ADULT - PERTINENT HX OF CURRENT PROBLEM, REHAB EVAL
76 year old F PMH HLD, hypothyroidism, peripheral neuropathy, anxiety coming in from home for worsening UTI symptoms. Patient was complaining of urinary frequency x2 days and was seen by PA from Dr. Reyes' office yesterday and was diagnosed with UTI and started on cefuroxime. Patient then started having worsening back pain from bed sores and increased fatigue and  decided to bring patient to the ED for further evaluation
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
## 2021-10-29 NOTE — PROGRESS NOTE ADULT - SUBJECTIVE AND OBJECTIVE BOX
Patient is a 76y old  Female who presents with a chief complaint of UTI       Patient seen and examined at bedside. Pt states they feel well, denies overnight events or current complaints including chest pain, shortness of breath, dizziness, nausea, vomiting, diarrhea, fever or chills.      ALLERGIES:  epinephrine (Unknown)  norepinephrine (Unknown)    MEDICATIONS  (STANDING):  cefTRIAXone   IVPB 1000 milliGRAM(s) IV Intermittent every 24 hours  DULoxetine 20 milliGRAM(s) Oral two times a day  enoxaparin Injectable 40 milliGRAM(s) SubCutaneous daily  gabapentin 100 milliGRAM(s) Oral three times a day  levothyroxine 75 MICROGram(s) Oral daily  nicotine - 21 mG/24Hr(s) Patch 1 patch Transdermal daily  simvastatin 10 milliGRAM(s) Oral at bedtime    MEDICATIONS  (PRN):  acetaminophen     Tablet .. 650 milliGRAM(s) Oral every 6 hours PRN Temp greater or equal to 38C (100.4F), Mild Pain (1 - 3)  ALPRAZolam 0.5 milliGRAM(s) Oral daily PRN anxiety  melatonin 3 milliGRAM(s) Oral at bedtime PRN Insomnia  ondansetron Injectable 4 milliGRAM(s) IV Push every 8 hours PRN Nausea and/or Vomiting  oxycodone    5 mG/acetaminophen 325 mG 1 Tablet(s) Oral every 12 hours PRN Severe Pain (7 - 10)    Vital Signs Last 24 Hrs  T(F): 97.5 (29 Oct 2021 08:09), Max: 98.9 (28 Oct 2021 21:05)  HR: 77 (29 Oct 2021 10:42) (67 - 85)  BP: 153/73 (29 Oct 2021 10:42) (130/75 - 158/82)  RR: 12 (29 Oct 2021 08:09) (12 - 18)  SpO2: 98% (29 Oct 2021 10:42) (95% - 98%)  I&O's Summary    28 Oct 2021 07:01  -  29 Oct 2021 07:00  --------------------------------------------------------  IN: 0 mL / OUT: 700 mL / NET: -700 mL    29 Oct 2021 07:01  -  29 Oct 2021 12:10  --------------------------------------------------------  IN: 240 mL / OUT: 0 mL / NET: 240 mL      PHYSICAL EXAM:  General: NAD, elderly, Alert  ENT: MMM, no oral thrush   Neck: Supple, No JVD  Lungs: Clear to auscultation bilaterally, non labored breathing  Cardio: RRR, S1/S2, No murmurs  Abdomen: Soft, Nontender, Nondistended; Bowel sounds present  Extremities: No calf tenderness, No pitting edema    LABS:                        12.4   6.94  )-----------( 257      ( 28 Oct 2021 06:30 )             39.1     10-28    146  |  113  |  11  ----------------------------<  106  3.9   |  22  |  0.91    Ca    9.4      28 Oct 2021 06:30    TPro  7.9  /  Alb  3.5  /  TBili  0.3  /  DBili  x   /  AST  23  /  ALT  25  /  AlkPhos  77  10-27    eGFR if Non African American: 62 mL/min/1.73M2 (10-28-21 @ 06:30)  eGFR if : 72 mL/min/1.73M2 (10-28-21 @ 06:30)      Lactate, Blood: 1.8 mmol/L (10-27 @ 14:00)                          Urinalysis Basic - ( 27 Oct 2021 14:07 )    Color: Yellow / Appearance: very cloudy / S.015 / pH: x  Gluc: x / Ketone: Negative  / Bili: Negative / Urobili: Negative   Blood: x / Protein: 100 / Nitrite: Negative   Leuk Esterase: Moderate / RBC: 11-25 /HPF / WBC >50 /HPF   Sq Epi: x / Non Sq Epi: Neg.-Few / Bacteria: Many /HPF        Culture - Urine (collected 27 Oct 2021 14:07)  Source: Clean Catch Clean Catch (Midstream)  Preliminary Report (29 Oct 2021 07:41):    >100,000 CFU/ml Gram Negative Rods    Culture - Blood (collected 27 Oct 2021 14:00)  Source: .Blood Blood-Peripheral  Preliminary Report (28 Oct 2021 22:01):    No growth to date.    Culture - Blood (collected 27 Oct 2021 14:00)  Source: .Blood Blood-Peripheral  Preliminary Report (28 Oct 2021 22:01):    No growth to date.      COVID-19 PCR: NotDetec (10-27-21 @ 14:00)      RADIOLOGY & ADDITIONAL TESTS:    Care Discussed with Consultants/Other Providers:

## 2021-10-29 NOTE — ADVANCED PRACTICE NURSE CONSULT - ASSESSMENT
Alert patient, assessed in bed. Likely incontinent of bladder & bowel, using Prima-Fit external catheter & diaper soiled with stool.   Patient has blanchable erythema to bilateral buttocks & sacral area.   On the left buttock is a small 1 x 3 cm area of partial thickness injury, not over a bony prominence.  On the right buttock there are 3 parallel, linear openings in skin, 3-4 x 0.5-1 cm, not over bony prominence.    These are all likely moisture related erosions, with the parallel linear wounds potentially abrasions.

## 2021-10-29 NOTE — ADVANCED PRACTICE NURSE CONSULT - RECOMMEDATIONS
Suggest normal saline cleanse, pat dry & apply Triad Cream to bilateral buttocks twice daily & PRN.  Frequent rounding to ensure skin is clean & dry.  Turn & position every 2 hours while in bed to offload pressure.   Nutritional support per dietician's recommendations.

## 2021-10-29 NOTE — PHYSICAL THERAPY INITIAL EVALUATION ADULT - ADDITIONAL COMMENTS
pt lives in private house w/spouse & dtr in private house, ramp to enter, 1 aleks, no stairs in house that pt uses. pt is minimally ambulatory; transfers to wheelchair w/rollator and assist from spouse. pt also has a straight cane and shower chair, grab bars, pt has HHA 7 hrs/day
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
## 2021-10-30 ENCOUNTER — TRANSCRIPTION ENCOUNTER (OUTPATIENT)
Age: 76
End: 2021-10-30

## 2021-10-30 VITALS
DIASTOLIC BLOOD PRESSURE: 71 MMHG | SYSTOLIC BLOOD PRESSURE: 126 MMHG | RESPIRATION RATE: 18 BRPM | TEMPERATURE: 99 F | OXYGEN SATURATION: 95 % | HEART RATE: 78 BPM

## 2021-10-30 LAB
-  AMIKACIN: SIGNIFICANT CHANGE UP
-  AMOXICILLIN/CLAVULANIC ACID: SIGNIFICANT CHANGE UP
-  AMPICILLIN/SULBACTAM: SIGNIFICANT CHANGE UP
-  AMPICILLIN: SIGNIFICANT CHANGE UP
-  AZTREONAM: SIGNIFICANT CHANGE UP
-  CEFAZOLIN: SIGNIFICANT CHANGE UP
-  CEFEPIME: SIGNIFICANT CHANGE UP
-  CEFOXITIN: SIGNIFICANT CHANGE UP
-  CEFTRIAXONE: SIGNIFICANT CHANGE UP
-  CIPROFLOXACIN: SIGNIFICANT CHANGE UP
-  ERTAPENEM: SIGNIFICANT CHANGE UP
-  GENTAMICIN: SIGNIFICANT CHANGE UP
-  IMIPENEM: SIGNIFICANT CHANGE UP
-  LEVOFLOXACIN: SIGNIFICANT CHANGE UP
-  MEROPENEM: SIGNIFICANT CHANGE UP
-  NITROFURANTOIN: SIGNIFICANT CHANGE UP
-  PIPERACILLIN/TAZOBACTAM: SIGNIFICANT CHANGE UP
-  TIGECYCLINE: SIGNIFICANT CHANGE UP
-  TOBRAMYCIN: SIGNIFICANT CHANGE UP
-  TRIMETHOPRIM/SULFAMETHOXAZOLE: SIGNIFICANT CHANGE UP
ANION GAP SERPL CALC-SCNC: 8 MMOL/L — SIGNIFICANT CHANGE UP (ref 5–17)
BUN SERPL-MCNC: 10 MG/DL — SIGNIFICANT CHANGE UP (ref 7–23)
CALCIUM SERPL-MCNC: 9.3 MG/DL — SIGNIFICANT CHANGE UP (ref 8.4–10.5)
CHLORIDE SERPL-SCNC: 107 MMOL/L — SIGNIFICANT CHANGE UP (ref 96–108)
CO2 SERPL-SCNC: 27 MMOL/L — SIGNIFICANT CHANGE UP (ref 22–31)
CREAT SERPL-MCNC: 1.07 MG/DL — SIGNIFICANT CHANGE UP (ref 0.5–1.3)
CULTURE RESULTS: SIGNIFICANT CHANGE UP
GLUCOSE SERPL-MCNC: 141 MG/DL — HIGH (ref 70–99)
METHOD TYPE: SIGNIFICANT CHANGE UP
ORGANISM # SPEC MICROSCOPIC CNT: SIGNIFICANT CHANGE UP
ORGANISM # SPEC MICROSCOPIC CNT: SIGNIFICANT CHANGE UP
POTASSIUM SERPL-MCNC: 3.8 MMOL/L — SIGNIFICANT CHANGE UP (ref 3.5–5.3)
POTASSIUM SERPL-SCNC: 3.8 MMOL/L — SIGNIFICANT CHANGE UP (ref 3.5–5.3)
SODIUM SERPL-SCNC: 142 MMOL/L — SIGNIFICANT CHANGE UP (ref 135–145)
SPECIMEN SOURCE: SIGNIFICANT CHANGE UP

## 2021-10-30 PROCEDURE — 93005 ELECTROCARDIOGRAM TRACING: CPT

## 2021-10-30 PROCEDURE — 81001 URINALYSIS AUTO W/SCOPE: CPT

## 2021-10-30 PROCEDURE — 97162 PT EVAL MOD COMPLEX 30 MIN: CPT

## 2021-10-30 PROCEDURE — 96365 THER/PROPH/DIAG IV INF INIT: CPT

## 2021-10-30 PROCEDURE — 99239 HOSP IP/OBS DSCHRG MGMT >30: CPT

## 2021-10-30 PROCEDURE — 36415 COLL VENOUS BLD VENIPUNCTURE: CPT

## 2021-10-30 PROCEDURE — 87040 BLOOD CULTURE FOR BACTERIA: CPT

## 2021-10-30 PROCEDURE — 80053 COMPREHEN METABOLIC PANEL: CPT

## 2021-10-30 PROCEDURE — 87635 SARS-COV-2 COVID-19 AMP PRB: CPT

## 2021-10-30 PROCEDURE — 85025 COMPLETE CBC W/AUTO DIFF WBC: CPT

## 2021-10-30 PROCEDURE — 99285 EMERGENCY DEPT VISIT HI MDM: CPT

## 2021-10-30 PROCEDURE — 87077 CULTURE AEROBIC IDENTIFY: CPT

## 2021-10-30 PROCEDURE — 80048 BASIC METABOLIC PNL TOTAL CA: CPT

## 2021-10-30 PROCEDURE — 87086 URINE CULTURE/COLONY COUNT: CPT

## 2021-10-30 PROCEDURE — 71045 X-RAY EXAM CHEST 1 VIEW: CPT

## 2021-10-30 PROCEDURE — 83605 ASSAY OF LACTIC ACID: CPT

## 2021-10-30 PROCEDURE — 87186 SC STD MICRODIL/AGAR DIL: CPT

## 2021-10-30 PROCEDURE — 86769 SARS-COV-2 COVID-19 ANTIBODY: CPT

## 2021-10-30 RX ORDER — CEFUROXIME AXETIL 250 MG
1 TABLET ORAL
Qty: 8 | Refills: 0
Start: 2021-10-30 | End: 2021-11-02

## 2021-10-30 RX ORDER — NICOTINE POLACRILEX 2 MG
1 GUM BUCCAL
Qty: 30 | Refills: 0
Start: 2021-10-30 | End: 2021-11-28

## 2021-10-30 RX ADMIN — Medication 75 MICROGRAM(S): at 05:37

## 2021-10-30 RX ADMIN — GABAPENTIN 100 MILLIGRAM(S): 400 CAPSULE ORAL at 05:37

## 2021-10-30 RX ADMIN — GABAPENTIN 100 MILLIGRAM(S): 400 CAPSULE ORAL at 14:11

## 2021-10-30 RX ADMIN — Medication 1 PATCH: at 11:32

## 2021-10-30 RX ADMIN — CEFTRIAXONE 100 MILLIGRAM(S): 500 INJECTION, POWDER, FOR SOLUTION INTRAMUSCULAR; INTRAVENOUS at 15:22

## 2021-10-30 RX ADMIN — DULOXETINE HYDROCHLORIDE 20 MILLIGRAM(S): 30 CAPSULE, DELAYED RELEASE ORAL at 05:37

## 2021-10-30 RX ADMIN — Medication 650 MILLIGRAM(S): at 15:08

## 2021-10-30 RX ADMIN — OXYCODONE AND ACETAMINOPHEN 1 TABLET(S): 5; 325 TABLET ORAL at 10:10

## 2021-10-30 RX ADMIN — Medication 650 MILLIGRAM(S): at 14:12

## 2021-10-30 RX ADMIN — ENOXAPARIN SODIUM 40 MILLIGRAM(S): 100 INJECTION SUBCUTANEOUS at 11:33

## 2021-10-30 RX ADMIN — OXYCODONE AND ACETAMINOPHEN 1 TABLET(S): 5; 325 TABLET ORAL at 09:46

## 2021-10-30 NOTE — PROGRESS NOTE ADULT - SUBJECTIVE AND OBJECTIVE BOX
Patient is a 76y old  Female who presents with a chief complaint of UTI (29 Oct 2021 12:10)      Patient seen and examined at bedside.  no acute events overnight    ALLERGIES:  epinephrine (Unknown)  norepinephrine (Unknown)        Vital Signs Last 24 Hrs  T(F): 98.4 (30 Oct 2021 07:53), Max: 98.9 (29 Oct 2021 16:29)  HR: 77 (30 Oct 2021 07:53) (74 - 77)  BP: 135/64 (30 Oct 2021 07:53) (122/73 - 153/73)  RR: 19 (30 Oct 2021 07:53) (18 - 19)  SpO2: 95% (30 Oct 2021 07:53) (94% - 98%)  I&O's Summary    29 Oct 2021 07:01  -  30 Oct 2021 07:00  --------------------------------------------------------  IN: 680 mL / OUT: 1000 mL / NET: -320 mL      MEDICATIONS:  acetaminophen     Tablet .. 650 milliGRAM(s) Oral every 6 hours PRN  ALPRAZolam 0.5 milliGRAM(s) Oral daily PRN  cefTRIAXone   IVPB 1000 milliGRAM(s) IV Intermittent every 24 hours  DULoxetine 20 milliGRAM(s) Oral two times a day  enoxaparin Injectable 40 milliGRAM(s) SubCutaneous daily  gabapentin 100 milliGRAM(s) Oral three times a day  levothyroxine 75 MICROGram(s) Oral daily  melatonin 3 milliGRAM(s) Oral at bedtime PRN  nicotine - 21 mG/24Hr(s) Patch 1 patch Transdermal daily  ondansetron Injectable 4 milliGRAM(s) IV Push every 8 hours PRN  oxycodone    5 mG/acetaminophen 325 mG 1 Tablet(s) Oral every 12 hours PRN  simvastatin 10 milliGRAM(s) Oral at bedtime      PHYSICAL EXAM:  General: NAD, Alert elderly female  ENT: MMM, no thrush  Neck: Supple, No JVD  Lungs: Clear to auscultation bilaterally, non labored  Cardio: S1S2 regular  Abdomen: Soft, Nontender  Extremities: No cyanosis, No edema    LABS:                        12.4   6.94  )-----------( 257      ( 28 Oct 2021 06:30 )             39.1     10-28    146  |  113  |  11  ----------------------------<  106  3.9   |  22  |  0.91    Ca    9.4      28 Oct 2021 06:30    TPro  7.9  /  Alb  3.5  /  TBili  0.3  /  DBili  x   /  AST  23  /  ALT  25  /  AlkPhos  77  10-27    eGFR if Non African American: 62 mL/min/1.73M2 (10-28-21 @ 06:30)  eGFR if : 72 mL/min/1.73M2 (10-28-21 @ 06:30)      Lactate, Blood: 1.8 mmol/L (10-27 @ 14:00)                          Urinalysis Basic - ( 27 Oct 2021 14:07 )    Color: Yellow / Appearance: very cloudy / S.015 / pH: x  Gluc: x / Ketone: Negative  / Bili: Negative / Urobili: Negative   Blood: x / Protein: 100 / Nitrite: Negative   Leuk Esterase: Moderate / RBC: 11-25 /HPF / WBC >50 /HPF   Sq Epi: x / Non Sq Epi: Neg.-Few / Bacteria: Many /HPF        Culture - Urine (collected 27 Oct 2021 14:07)  Source: Clean Catch Clean Catch (Midstream)  Preliminary Report (29 Oct 2021 14:00):    >100,000 CFU/ml Citrobacter freundii complex    Culture - Blood (collected 27 Oct 2021 14:00)  Source: .Blood Blood-Peripheral  Preliminary Report (28 Oct 2021 22:01):    No growth to date.    Culture - Blood (collected 27 Oct 2021 14:00)  Source: .Blood Blood-Peripheral  Preliminary Report (28 Oct 2021 22:01):    No growth to date.      COVID-19 PCR: NotDetec (10-27-21 @ 14:00)      RADIOLOGY & ADDITIONAL TESTS:    Care Discussed with Consultants/Other Providers:
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
## 2021-10-30 NOTE — DISCHARGE NOTE PROVIDER - CARE PROVIDER_API CALL
Reyes, John A (MD)  Internal Medicine  [address] [facility], [address]  [place] [address]  Phone: [phone]  Fax: [phone]  Follow Up Time:

## 2021-10-30 NOTE — DISCHARGE NOTE PROVIDER - NSDCFUADDINST_GEN_ALL_CORE_FT
wound care for bilateral buttocks  normal saline cleanse, pat dry, apply triad cream to bilateral buttocks twice daily and as needed  nutritional support, frequent turn and position to offload

## 2021-10-30 NOTE — DISCHARGE NOTE NURSING/CASE MANAGEMENT/SOCIAL WORK - NSDCVIVACCINE_GEN_ALL_CORE_FT
Tdap; 14-Apr-2021 15:25; Chantelle Meehan (RN); [employer]; [id number] (Exp. Date: 21-Jul-2022); IntraMuscular; Deltoid Right.; 0.5 milliLiter(s); VIS (VIS Published: 09-May-2013, VIS Presented: 14-Apr-2021);

## 2021-10-30 NOTE — DISCHARGE NOTE PROVIDER - NSDCMRMEDTOKEN_GEN_ALL_CORE_FT
ALPRAZolam 0.5 mg oral tablet: orally once a day, As Needed  Cymbalta 20 mg oral delayed release capsule: 1 cap(s) orally 2 times a day  gabapentin 100 mg oral capsule: 1 cap(s) orally 3 times a day  Habitrol 21 mg/24 hr transdermal film, extended release: 1  transdermal once a day   oxycodone-acetaminophen 5 mg-325 mg oral tablet: 1 tab(s) orally 2 times a day  simvastatin 10 mg oral tablet: 1 tab(s) orally once a day (at bedtime)  Synthroid 75 mcg (0.075 mg) oral tablet: 1 tab(s) orally once a day  Vitamin D2 1.25 mg (50,000 intl units) oral capsule: 1 cap(s) orally once a week   ALPRAZolam 0.5 mg oral tablet: orally once a day, As Needed  cefuroxime 250 mg oral tablet: 1 tab(s) orally 2 times a day   Cymbalta 20 mg oral delayed release capsule: 1 cap(s) orally 2 times a day  gabapentin 100 mg oral capsule: 1 cap(s) orally 3 times a day  Habitrol 21 mg/24 hr transdermal film, extended release: 1  transdermal once a day   oxycodone-acetaminophen 5 mg-325 mg oral tablet: 1 tab(s) orally 2 times a day  simvastatin 10 mg oral tablet: 1 tab(s) orally once a day (at bedtime)  Synthroid 75 mcg (0.075 mg) oral tablet: 1 tab(s) orally once a day  Vitamin D2 1.25 mg (50,000 intl units) oral capsule: 1 cap(s) orally once a week

## 2021-10-30 NOTE — DISCHARGE NOTE NURSING/CASE MANAGEMENT/SOCIAL WORK - PATIENT PORTAL LINK FT
You can access the FollowMyHealth Patient Portal offered by [facility] by registering at the following website: [email or web address]. By joining [employer]’s FollowMyHealth portal, you will also be able to view your health information using other applications (apps) compatible with our system.

## 2021-10-30 NOTE — DISCHARGE NOTE PROVIDER - NSDCCPCAREPLAN_GEN_ALL_CORE_FT
PRINCIPAL DISCHARGE DIAGNOSIS  Diagnosis: Acute UTI  Assessment and Plan of Treatment: you completed three days of Iv antibiotics
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
## 2021-11-01 ENCOUNTER — INPATIENT (INPATIENT)
Facility: HOSPITAL | Age: 76
LOS: 2 days | Discharge: HOME | DRG: 871 | End: 2021-11-04
Attending: HOSPITALIST | Admitting: STUDENT IN AN ORGANIZED HEALTH CARE EDUCATION/TRAINING PROGRAM
Payer: MEDICARE

## 2021-11-01 VITALS
OXYGEN SATURATION: 97 % | HEART RATE: 75 BPM | WEIGHT: 139.99 LBS | RESPIRATION RATE: 16 BRPM | HEIGHT: 60 IN | DIASTOLIC BLOOD PRESSURE: 70 MMHG | SYSTOLIC BLOOD PRESSURE: 220 MMHG | TEMPERATURE: 97 F

## 2021-11-01 DIAGNOSIS — N39.0 URINARY TRACT INFECTION, SITE NOT SPECIFIED: ICD-10-CM

## 2021-11-01 LAB
ALBUMIN SERPL ELPH-MCNC: 3.5 G/DL — SIGNIFICANT CHANGE UP (ref 3.3–5)
ALBUMIN SERPL ELPH-MCNC: 3.7 G/DL — SIGNIFICANT CHANGE UP (ref 3.3–5)
ALP SERPL-CCNC: 78 U/L — SIGNIFICANT CHANGE UP (ref 40–120)
ALP SERPL-CCNC: 82 U/L — SIGNIFICANT CHANGE UP (ref 40–120)
ALT FLD-CCNC: 27 U/L — SIGNIFICANT CHANGE UP (ref 10–45)
ALT FLD-CCNC: 30 U/L — SIGNIFICANT CHANGE UP (ref 10–45)
ANION GAP SERPL CALC-SCNC: 10 MMOL/L — SIGNIFICANT CHANGE UP (ref 5–17)
APPEARANCE UR: CLEAR — SIGNIFICANT CHANGE UP
AST SERPL-CCNC: 111 U/L — HIGH (ref 10–40)
AST SERPL-CCNC: 53 U/L — HIGH (ref 10–40)
BACTERIA # UR AUTO: ABNORMAL /HPF
BASOPHILS # BLD AUTO: 0.04 K/UL — SIGNIFICANT CHANGE UP (ref 0–0.2)
BASOPHILS NFR BLD AUTO: 0.2 % — SIGNIFICANT CHANGE UP (ref 0–2)
BILIRUB SERPL-MCNC: 0.4 MG/DL — SIGNIFICANT CHANGE UP (ref 0.2–1.2)
BILIRUB SERPL-MCNC: 0.8 MG/DL — SIGNIFICANT CHANGE UP (ref 0.2–1.2)
BILIRUB UR-MCNC: NEGATIVE — SIGNIFICANT CHANGE UP
BUN SERPL-MCNC: 11 MG/DL — SIGNIFICANT CHANGE UP (ref 7–23)
BUN SERPL-MCNC: 12 MG/DL — SIGNIFICANT CHANGE UP (ref 7–23)
BUN SERPL-MCNC: 13 MG/DL — SIGNIFICANT CHANGE UP (ref 7–23)
CALCIUM SERPL-MCNC: 10 MG/DL — SIGNIFICANT CHANGE UP (ref 8.4–10.5)
CALCIUM SERPL-MCNC: 9.2 MG/DL — SIGNIFICANT CHANGE UP (ref 8.4–10.5)
CALCIUM SERPL-MCNC: 9.3 MG/DL — SIGNIFICANT CHANGE UP (ref 8.4–10.5)
CHLORIDE SERPL-SCNC: 104 MMOL/L — SIGNIFICANT CHANGE UP (ref 96–108)
CHLORIDE SERPL-SCNC: 104 MMOL/L — SIGNIFICANT CHANGE UP (ref 96–108)
CHLORIDE SERPL-SCNC: 98 MMOL/L — SIGNIFICANT CHANGE UP (ref 96–108)
CO2 SERPL-SCNC: 23 MMOL/L — SIGNIFICANT CHANGE UP (ref 22–31)
CO2 SERPL-SCNC: 25 MMOL/L — SIGNIFICANT CHANGE UP (ref 22–31)
CO2 SERPL-SCNC: 26 MMOL/L — SIGNIFICANT CHANGE UP (ref 22–31)
COLOR SPEC: YELLOW — SIGNIFICANT CHANGE UP
CREAT SERPL-MCNC: 0.89 MG/DL — SIGNIFICANT CHANGE UP (ref 0.5–1.3)
CREAT SERPL-MCNC: 0.97 MG/DL — SIGNIFICANT CHANGE UP (ref 0.5–1.3)
CREAT SERPL-MCNC: 1.04 MG/DL — SIGNIFICANT CHANGE UP (ref 0.5–1.3)
CULTURE RESULTS: SIGNIFICANT CHANGE UP
CULTURE RESULTS: SIGNIFICANT CHANGE UP
DIFF PNL FLD: ABNORMAL
EOSINOPHIL # BLD AUTO: 0 K/UL — SIGNIFICANT CHANGE UP (ref 0–0.5)
EOSINOPHIL NFR BLD AUTO: 0 % — SIGNIFICANT CHANGE UP (ref 0–6)
EPI CELLS # UR: SIGNIFICANT CHANGE UP
FLUAV AG NPH QL: SIGNIFICANT CHANGE UP
FLUBV AG NPH QL: SIGNIFICANT CHANGE UP
GLUCOSE SERPL-MCNC: 124 MG/DL — HIGH (ref 70–99)
GLUCOSE SERPL-MCNC: 131 MG/DL — HIGH (ref 70–99)
GLUCOSE SERPL-MCNC: 137 MG/DL — HIGH (ref 70–99)
GLUCOSE UR QL: NEGATIVE — SIGNIFICANT CHANGE UP
HCT VFR BLD CALC: 50.4 % — HIGH (ref 34.5–45)
HGB BLD-MCNC: 16.5 G/DL — HIGH (ref 11.5–15.5)
IMM GRANULOCYTES NFR BLD AUTO: 0.6 % — SIGNIFICANT CHANGE UP (ref 0–1.5)
KETONES UR-MCNC: ABNORMAL
LACTATE SERPL-SCNC: 1.3 MMOL/L — SIGNIFICANT CHANGE UP (ref 0.7–2)
LACTATE SERPL-SCNC: 1.6 MMOL/L — SIGNIFICANT CHANGE UP (ref 0.7–2)
LACTATE SERPL-SCNC: 2.3 MMOL/L — HIGH (ref 0.7–2)
LEUKOCYTE ESTERASE UR-ACNC: ABNORMAL
LYMPHOCYTES # BLD AUTO: 1.72 K/UL — SIGNIFICANT CHANGE UP (ref 1–3.3)
LYMPHOCYTES # BLD AUTO: 10 % — LOW (ref 13–44)
MCHC RBC-ENTMCNC: 28.4 PG — SIGNIFICANT CHANGE UP (ref 27–34)
MCHC RBC-ENTMCNC: 32.7 GM/DL — SIGNIFICANT CHANGE UP (ref 32–36)
MCV RBC AUTO: 86.6 FL — SIGNIFICANT CHANGE UP (ref 80–100)
MONOCYTES # BLD AUTO: 0.54 K/UL — SIGNIFICANT CHANGE UP (ref 0–0.9)
MONOCYTES NFR BLD AUTO: 3.1 % — SIGNIFICANT CHANGE UP (ref 2–14)
NEUTROPHILS # BLD AUTO: 14.85 K/UL — HIGH (ref 1.8–7.4)
NEUTROPHILS NFR BLD AUTO: 86.1 % — HIGH (ref 43–77)
NITRITE UR-MCNC: NEGATIVE — SIGNIFICANT CHANGE UP
NRBC # BLD: 0 /100 WBCS — SIGNIFICANT CHANGE UP (ref 0–0)
PH UR: 7 — SIGNIFICANT CHANGE UP (ref 5–8)
PLATELET # BLD AUTO: 419 K/UL — HIGH (ref 150–400)
POTASSIUM SERPL-MCNC: 4 MMOL/L — SIGNIFICANT CHANGE UP (ref 3.5–5.3)
POTASSIUM SERPL-MCNC: 5.8 MMOL/L — HIGH (ref 3.5–5.3)
POTASSIUM SERPL-MCNC: SIGNIFICANT CHANGE UP MMOL/L (ref 3.5–5.3)
POTASSIUM SERPL-SCNC: 4 MMOL/L — SIGNIFICANT CHANGE UP (ref 3.5–5.3)
POTASSIUM SERPL-SCNC: 5.8 MMOL/L — HIGH (ref 3.5–5.3)
POTASSIUM SERPL-SCNC: SIGNIFICANT CHANGE UP MMOL/L (ref 3.5–5.3)
PROT SERPL-MCNC: 8.1 G/DL — SIGNIFICANT CHANGE UP (ref 6–8.3)
PROT SERPL-MCNC: 9.5 G/DL — HIGH (ref 6–8.3)
PROT UR-MCNC: 30 MG/DL
RBC # BLD: 5.82 M/UL — HIGH (ref 3.8–5.2)
RBC # FLD: 14.1 % — SIGNIFICANT CHANGE UP (ref 10.3–14.5)
RBC CASTS # UR COMP ASSIST: ABNORMAL /HPF (ref 0–4)
RSV RNA NPH QL NAA+NON-PROBE: SIGNIFICANT CHANGE UP
SARS-COV-2 RNA SPEC QL NAA+PROBE: SIGNIFICANT CHANGE UP
SODIUM SERPL-SCNC: 133 MMOL/L — LOW (ref 135–145)
SODIUM SERPL-SCNC: 137 MMOL/L — SIGNIFICANT CHANGE UP (ref 135–145)
SODIUM SERPL-SCNC: 140 MMOL/L — SIGNIFICANT CHANGE UP (ref 135–145)
SP GR SPEC: 1 — LOW (ref 1.01–1.02)
SPECIMEN SOURCE: SIGNIFICANT CHANGE UP
SPECIMEN SOURCE: SIGNIFICANT CHANGE UP
UROBILINOGEN FLD QL: NEGATIVE — SIGNIFICANT CHANGE UP
WBC # BLD: 17.25 K/UL — HIGH (ref 3.8–10.5)
WBC # FLD AUTO: 17.25 K/UL — HIGH (ref 3.8–10.5)
WBC UR QL: >50 /HPF (ref 0–5)

## 2021-11-01 PROCEDURE — 71045 X-RAY EXAM CHEST 1 VIEW: CPT | Mod: 26

## 2021-11-01 PROCEDURE — 70450 CT HEAD/BRAIN W/O DYE: CPT | Mod: 26,MA

## 2021-11-01 PROCEDURE — 99406 BEHAV CHNG SMOKING 3-10 MIN: CPT

## 2021-11-01 PROCEDURE — 99285 EMERGENCY DEPT VISIT HI MDM: CPT

## 2021-11-01 PROCEDURE — 93010 ELECTROCARDIOGRAM REPORT: CPT

## 2021-11-01 PROCEDURE — 99223 1ST HOSP IP/OBS HIGH 75: CPT

## 2021-11-01 RX ORDER — LEVOTHYROXINE SODIUM 125 MCG
75 TABLET ORAL DAILY
Refills: 0 | Status: DISCONTINUED | OUTPATIENT
Start: 2021-11-01 | End: 2021-11-04

## 2021-11-01 RX ORDER — ENOXAPARIN SODIUM 100 MG/ML
40 INJECTION SUBCUTANEOUS DAILY
Refills: 0 | Status: DISCONTINUED | OUTPATIENT
Start: 2021-11-01 | End: 2021-11-04

## 2021-11-01 RX ORDER — MEROPENEM 1 G/30ML
500 INJECTION INTRAVENOUS ONCE
Refills: 0 | Status: COMPLETED | OUTPATIENT
Start: 2021-11-01 | End: 2021-11-01

## 2021-11-01 RX ORDER — CEFTRIAXONE 500 MG/1
1000 INJECTION, POWDER, FOR SOLUTION INTRAMUSCULAR; INTRAVENOUS EVERY 24 HOURS
Refills: 0 | Status: DISCONTINUED | OUTPATIENT
Start: 2021-11-01 | End: 2021-11-02

## 2021-11-01 RX ORDER — SODIUM CHLORIDE 9 MG/ML
1950 INJECTION INTRAMUSCULAR; INTRAVENOUS; SUBCUTANEOUS ONCE
Refills: 0 | Status: COMPLETED | OUTPATIENT
Start: 2021-11-01 | End: 2021-11-01

## 2021-11-01 RX ORDER — CEFEPIME 1 G/1
1000 INJECTION, POWDER, FOR SOLUTION INTRAMUSCULAR; INTRAVENOUS EVERY 12 HOURS
Refills: 0 | Status: DISCONTINUED | OUTPATIENT
Start: 2021-11-01 | End: 2021-11-01

## 2021-11-01 RX ORDER — DULOXETINE HYDROCHLORIDE 30 MG/1
20 CAPSULE, DELAYED RELEASE ORAL DAILY
Refills: 0 | Status: DISCONTINUED | OUTPATIENT
Start: 2021-11-01 | End: 2021-11-04

## 2021-11-01 RX ORDER — ALPRAZOLAM 0.25 MG
0.5 TABLET ORAL DAILY
Refills: 0 | Status: DISCONTINUED | OUTPATIENT
Start: 2021-11-01 | End: 2021-11-04

## 2021-11-01 RX ORDER — NICOTINE POLACRILEX 2 MG
1 GUM BUCCAL DAILY
Refills: 0 | Status: DISCONTINUED | OUTPATIENT
Start: 2021-11-01 | End: 2021-11-04

## 2021-11-01 RX ORDER — INFLUENZA VIRUS VACCINE 15; 15; 15; 15 UG/.5ML; UG/.5ML; UG/.5ML; UG/.5ML
0.7 SUSPENSION INTRAMUSCULAR ONCE
Refills: 0 | Status: COMPLETED | OUTPATIENT
Start: 2021-11-01 | End: 2021-11-01

## 2021-11-01 RX ORDER — SODIUM CHLORIDE 9 MG/ML
1000 INJECTION INTRAMUSCULAR; INTRAVENOUS; SUBCUTANEOUS
Refills: 0 | Status: DISCONTINUED | OUTPATIENT
Start: 2021-11-01 | End: 2021-11-04

## 2021-11-01 RX ORDER — GABAPENTIN 400 MG/1
100 CAPSULE ORAL THREE TIMES A DAY
Refills: 0 | Status: DISCONTINUED | OUTPATIENT
Start: 2021-11-01 | End: 2021-11-04

## 2021-11-01 RX ORDER — SIMVASTATIN 20 MG/1
10 TABLET, FILM COATED ORAL AT BEDTIME
Refills: 0 | Status: DISCONTINUED | OUTPATIENT
Start: 2021-11-01 | End: 2021-11-04

## 2021-11-01 RX ADMIN — SODIUM CHLORIDE 75 MILLILITER(S): 9 INJECTION INTRAMUSCULAR; INTRAVENOUS; SUBCUTANEOUS at 19:43

## 2021-11-01 RX ADMIN — SODIUM CHLORIDE 1950 MILLILITER(S): 9 INJECTION INTRAMUSCULAR; INTRAVENOUS; SUBCUTANEOUS at 16:00

## 2021-11-01 RX ADMIN — MEROPENEM 100 MILLIGRAM(S): 1 INJECTION INTRAVENOUS at 16:29

## 2021-11-01 RX ADMIN — SIMVASTATIN 10 MILLIGRAM(S): 20 TABLET, FILM COATED ORAL at 22:44

## 2021-11-01 RX ADMIN — GABAPENTIN 100 MILLIGRAM(S): 400 CAPSULE ORAL at 22:44

## 2021-11-01 RX ADMIN — CEFTRIAXONE 100 MILLIGRAM(S): 500 INJECTION, POWDER, FOR SOLUTION INTRAMUSCULAR; INTRAVENOUS at 22:44
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
## 2021-11-01 NOTE — H&P ADULT - NSHPREVIEWOFSYSTEMS_GEN_ALL_CORE
REVIEW OF SYSTEMS:  CONSTITUTIONAL: No fever, weight loss, or fatigue  EYES: No eye pain, visual disturbances, or discharge  ENMT:  No difficulty hearing, tinnitus, vertigo; No sinus or throat pain  NECK: No pain or stiffness  BREASTS: No pain, masses, or nipple discharge  RESPIRATORY: No cough, wheezing, chills or hemoptysis; No shortness of breath  CARDIOVASCULAR: No chest pain, palpitations, dizziness, or leg swelling  GASTROINTESTINAL: No abdominal or epigastric pain. No nausea, vomiting, or hematemesis; No diarrhea or constipation. No melena or hematochezia.  GENITOURINARY: No dysuria, frequency, hematuria, or incontinence  NEUROLOGICAL: No headaches, memory loss, loss of strength, numbness, or tremors  SKIN: No itching, burning, rashes, or lesions   LYMPH NODES: No enlarged glands  ENDOCRINE: No heat or cold intolerance; No hair loss  MUSCULOSKELETAL: + joint pain or swelling; No muscle, back, or extremity pain  PSYCHIATRIC: No depression, anxiety, mood swings, or difficulty sleeping  HEME/LYMPH: No easy bruising, or bleeding gums  ALLERY AND IMMUNOLOGIC: No hives or eczema    ALL ROS REVIEWED AND NORMAL EXCEPT AS STATED ABOVE

## 2021-11-01 NOTE — ED PROVIDER NOTE - CARE PLAN
1 Principal Discharge DX:	UTI (urinary tract infection)  Secondary Diagnosis:	Adult failure to thrive

## 2021-11-01 NOTE — ED PROVIDER NOTE - CLINICAL SUMMARY MEDICAL DECISION MAKING FREE TEXT BOX
76 yr old female with hx of HLD, hypothyroidism, peripheral neuropathy, anxiety, recently admitted to [facility] and d/c on 10/ 30 due to UTI, was given ceftriaxone for  3days and then dc with cefuroxime, presents today with generalized weakness, unable to care for herself, and confusion since discharged 2 days ago. Pt AOX 3 in ED. Denies any current new pain.   well appearing, clear lungs, AOX3, NIH 0, + urine odor on exam   will given iv abx, labs, ct head and likely admit 76 yr old female with hx of HLD, hypothyroidism, peripheral neuropathy, anxiety, recently admitted to [facility] and d/c on 10/ 30 due to UTI, was given ceftriaxone for  3days and then dc with cefuroxime, presents today with generalized weakness, unable to care for herself, and confusion since discharged 2 days ago. Pt AOX 3 in ED. Denies any current new pain.   well appearing, clear lungs, AOX3, NIH 0, + urine odor on exam   will given iv abx, labs, ct head   labs reviewed. given iv abx and iv fluids for UTI. plan to admit to medicine.

## 2021-11-01 NOTE — H&P ADULT - NSHPPHYSICALEXAM_GEN_ALL_CORE
T(C): 37.7 (11-01-21 @ 14:41), Max: 37.7 (11-01-21 @ 14:41)  HR: 104 (11-01-21 @ 17:06) (75 - 104)  BP: 155/84 (11-01-21 @ 17:06) (155/84 - 220/70)  RR: 17 (11-01-21 @ 17:06) (16 - 18)  SpO2: 100% (11-01-21 @ 17:06) (97% - 100%)  Wt(kg): --Vital Signs Last 24 Hrs  T(C): 37.7 (01 Nov 2021 14:41), Max: 37.7 (01 Nov 2021 14:41)  T(F): 99.8 (01 Nov 2021 14:41), Max: 99.8 (01 Nov 2021 14:41)  HR: 104 (01 Nov 2021 17:06) (75 - 104)  BP: 155/84 (01 Nov 2021 17:06) (155/84 - 220/70)  BP(mean): 110 (01 Nov 2021 16:00) (110 - 110)  RR: 17 (01 Nov 2021 17:06) (16 - 18)  SpO2: 100% (01 Nov 2021 17:06) (97% - 100%)    PHYSICAL EXAM:  GENERAL: NAD, elderly female  HEAD:  Atraumatic, Normocephalic  EYES: EOMI, PERRLA, conjunctiva and sclera clear  ENMT: No tonsillar erythema, exudates, or enlargement; Dry mucous membranes, Poor dentition, No lesions  NECK: Supple, No JVD, Normal thyroid  NERVOUS SYSTEM:  Alert & Oriented X3, Good concentration; Motor Strength 5/5 B/L upper and lower extremities; DTRs 2+ intact and symmetric  CHEST/LUNG: Clear to percussion bilaterally; No rales, rhonchi, wheezing, or rubs  HEART: Regular rate and rhythm; No murmurs, rubs, or gallops  ABDOMEN: Soft, Nontender, Nondistended; Bowel sounds present  EXTREMITIES:  2+ Peripheral Pulses, No clubbing, cyanosis, or edema  LYMPH: No lymphadenopathy noted  SKIN: No rashes or lesions

## 2021-11-01 NOTE — H&P ADULT - ASSESSMENT
76 year old female with hld, hypothyroid, peripheral neuropathy, anxiety, s/p recent admission for UTI presents with worsening symptoms, failure to thrive, dehydration.    # UTI  # Leukocytosis  pt reports foul smelling urine, suprapubic discomfort  received one dose of meropenem 500 in ED  previous urine culture grew citrobacter spp (from previous admission)  will cover with cefepime 1 gram Q12  follow up urine and blood cultures  currently afebrile in ED, continue to monitor fever curve and WBC    # Dehydration  # Lactic Acidosis  appears clinically dehydrated  Lactate 2.3 in ED, suspect 2/2 dehydration  received 1950 ml NS bolus  follow up repeat lactate  continue gentle hydration for now    # Hyperkalemia  K 5.8 in ED  follow up repeat BMP  monitor for now    # Stage 2 Sacral Decubiti  normal saline cleanse and cream to bilateral buttock wounds  frequent rounding to ensure skin is clean and dry  turn and position  nutritional support    # HLD  chronic condition  continue simvastatin    # Peripheral Neuropathy  chronic condition  continue neurontin    # Rheumatoid Arthritis  chronic condition  pt takes oxycodone BID prn    # Hypothyroid  chronic condition  continue levothyroxine    # Anxiety  chronic condition  continue home xanax and cymbalta    # Nicotine Addiction  encourage smoking cessation  smokes about 1/2-1 ppd  will order nicotine patch daily    # Prophylactic Measure  lovenox                                     76 year old female with hld, hypothyroid, peripheral neuropathy, anxiety, s/p recent admission for UTI presents with worsening symptoms, failure to thrive, dehydration.    # Severe Sepsis 2/2 UTI  # Metabolic Encephalopathy  pt met SIRS criteria with leukocytosis and tachycardia, lactate 2.3  s/p 1950 ml NS bolus in ED  pt reports foul smelling urine, suprapubic discomfort  CTH in ED unremarkable  received one dose of meropenem 500 in ED  previous urine culture grew citrobacter spp (from previous admission)  will cover with cefepime 1 gram Q12  follow up urine and blood cultures  currently afebrile in ED, continue to monitor fever curve and WBC    # Dehydration  # Lactic Acidosis  appears clinically dehydrated  received 1950 ml NS bolus  continue gentle hydration for now    # Hyperkalemia  K 5.8 in ED  follow up repeat BMP  monitor for now    # Stage 2 Sacral Decubiti  normal saline cleanse and cream to bilateral buttock wounds  frequent rounding to ensure skin is clean and dry  turn and position  nutritional support    # HLD  chronic condition  continue simvastatin    # Peripheral Neuropathy  chronic condition  continue neurontin    # Rheumatoid Arthritis  chronic condition  pt takes oxycodone BID prn    # Hypothyroid  chronic condition  continue levothyroxine    # Anxiety  chronic condition  continue home xanax and cymbalta    # Nicotine Addiction  encourage smoking cessation  smokes about 1/2-1 ppd  will order nicotine patch daily    # Prophylactic Measure  lovenox                                     76 year old female with hld, hypothyroid, peripheral neuropathy, anxiety, s/p recent admission for UTI presents with worsening symptoms, failure to thrive, dehydration.    # Severe Sepsis 2/2 UTI  # Metabolic Encephalopathy  pt met SIRS criteria with leukocytosis and tachycardia, lactate 2.3  s/p 1950 ml NS bolus in ED  pt reports foul smelling urine, suprapubic discomfort  CTH in ED unremarkable  received one dose of meropenem 500 in ED  previous urine culture grew citrobacter spp (from previous admission)  will cover with cefepime 1 gram Q12  follow up urine and blood cultures  currently afebrile in ED, continue to monitor fever curve and WBC    # Dehydration  # Lactic Acidosis  appears clinically dehydrated  received 1950 ml NS bolus  continue gentle hydration for now    # Hyperkalemia  K 5.8 in ED  follow up repeat BMP  monitor for now    # Stage 2 Sacral Decubiti  normal saline cleanse and cream to bilateral buttock wounds  frequent rounding to ensure skin is clean and dry  turn and position  nutritional support    # HLD  chronic condition  continue simvastatin    # Peripheral Neuropathy  chronic condition  continue neurontin    # Rheumatoid Arthritis  chronic condition  pt takes oxycodone BID prn    # Hypothyroid  chronic condition  continue levothyroxine    # Anxiety  chronic condition  continue home xanax and cymbalta    # Nicotine Addiction  encourage smoking cessation  smokes about 1/2-1 ppd  will order nicotine patch daily    # Prophylactic Measure  lovenox    CAPRINI SCORE [CLOT]    AGE RELATED RISK FACTORS                                                       MOBILITY RELATED FACTORS  [ ] Age 41-60 years                                            (1 Point)                  [ ] Bed rest                                                        (1 Point)  [x ] Age: 61-74 years                                           (2 Points)                 [ ] Plaster cast                                                   (2 Points)  [ ] Age= 75 years                                              (3 Points)                 [ ] Bed bound for more than 72 hours                 (2 Points)    DISEASE RELATED RISK FACTORS                                               GENDER SPECIFIC FACTORS  [ ] Edema in the lower extremities                       (1 Point)                  [ ] Pregnancy                                                     (1 Point)  [ ] Varicose veins                                               (1 Point)                  [ ] Post-partum < 6 weeks                                   (1 Point)             [x ] BMI > 25 Kg/m2                                            (1 Point)                  [ ] Hormonal therapy  or oral contraception          (1 Point)                 [ ] Sepsis (in the previous month)                        (1 Point)                  [ ] History of pregnancy complications                 (1 point)  [ ] Pneumonia or serious lung disease                                               [ ] Unexplained or recurrent                     (1 Point)           (in the previous month)                               (1 Point)  [ ] Abnormal pulmonary function test                     (1 Point)                 SURGERY RELATED RISK FACTORS  [ ] Acute myocardial infarction                              (1 Point)                 [ ]  Section                                             (1 Point)  [ ] Congestive heart failure (in the previous month)  (1 Point)               [ ] Minor surgery                                                  (1 Point)   [ ] Inflammatory bowel disease                             (1 Point)                 [ ] Arthroscopic surgery                                        (2 Points)  [ ] Central venous access                                      (2 Points)                [ ] General surgery lasting more than 45 minutes   (2 Points)       [ ] Stroke (in the previous month)                          (5 Points)               [ ] Elective arthroplasty                                         (5 Points)                                                                                                                                               HEMATOLOGY RELATED FACTORS                                                 TRAUMA RELATED RISK FACTORS  [ ] Prior episodes of VTE                                     (3 Points)                [ ] Fracture of the hip, pelvis, or leg                       (5 Points)  [ ] Positive family history for VTE                         (3 Points)                 [ ] Acute spinal cord injury (in the previous month)  (5 Points)  [ ] Prothrombin 70088 A                                     (3 Points)                 [ ] Paralysis  (less than 1 month)                             (5 Points)  [ ] Factor V Leiden                                             (3 Points)                  [ ] Multiple Trauma within 1 month                        (5 Points)  [ ] Lupus anticoagulants                                     (3 Points)                                                           [ ] Anticardiolipin antibodies                               (3 Points)                                                       [ ] High homocysteine in the blood                      (3 Points)                                             [ ] Other congenital or acquired thrombophilia      (3 Points)                                                [ ] Heparin induced thrombocytopenia                  (3 Points)                                          Total Score [    3      ]    Caprini Score 0 - 2:  Low Risk, No VTE Prophylaxis required for most patients, encourage ambulation  Caprini Score 3 - 6:  At Risk, pharmacologic VTE prophylaxis is indicated for most patients (in the absence of a contraindication)  Caprini Score Greater than or = 7:  High Risk, pharmacologic VTE prophylaxis is indicated for most patients (in the absence of a contraindication)                                     76 year old female with hld, hypothyroid, peripheral neuropathy, anxiety, s/p recent admission for UTI presents with worsening symptoms, failure to thrive, dehydration.    # Severe Sepsis 2/2 UTI  # Metabolic Encephalopathy  pt met SIRS criteria with leukocytosis and tachycardia, lactate 2.3, mental status changes. received meropenem in the ER.  s/p 1950 ml NS bolus in ED  pt reports foul smelling urine, suprapubic discomfort  CTH in ED unremarkable  received one dose of meropenem 500 in ED  previous urine culture grew citrobacter spp (from previous admission)  will cover with ceftriaxone 1 gram q24 hours  - previous urine culture sensitive. If decompensates, broaden to cefepime.   follow up urine and blood cultures  currently afebrile in ED, continue to monitor fever curve and WBC    # Dehydration  # Lactic Acidosis  appears clinically dehydrated  received 1950 ml NS bolus  continue gentle hydration for now      # Hyperkalemia  K 5.8 in ED  follow up repeat BMP  monitor for now    # Stage 2 Sacral Decubiti  normal saline cleanse and cream to bilateral buttock wounds  frequent rounding to ensure skin is clean and dry  turn and position  nutritional support    # HLD  chronic condition  continue simvastatin    # Peripheral Neuropathy  chronic condition  continue neurontin    # Rheumatoid Arthritis  chronic condition  pt takes oxycodone BID prn    # Hypothyroid  chronic condition  continue levothyroxine    # Anxiety  chronic condition  continue home xanax and cymbalta    # Nicotine Addiction  encourage smoking cessation  smokes about 1/2-1 ppd  will order nicotine patch daily    # Prophylactic Measure  lovenox    CAPRINI SCORE [CLOT]    AGE RELATED RISK FACTORS                                                       MOBILITY RELATED FACTORS  [ ] Age 41-60 years                                            (1 Point)                  [ ] Bed rest                                                        (1 Point)  [x ] Age: 61-74 years                                           (2 Points)                 [ ] Plaster cast                                                   (2 Points)  [ ] Age= 75 years                                              (3 Points)                 [ ] Bed bound for more than 72 hours                 (2 Points)    DISEASE RELATED RISK FACTORS                                               GENDER SPECIFIC FACTORS  [ ] Edema in the lower extremities                       (1 Point)                  [ ] Pregnancy                                                     (1 Point)  [ ] Varicose veins                                               (1 Point)                  [ ] Post-partum < 6 weeks                                   (1 Point)             [x ] BMI > 25 Kg/m2                                            (1 Point)                  [ ] Hormonal therapy  or oral contraception          (1 Point)                 [ ] Sepsis (in the previous month)                        (1 Point)                  [ ] History of pregnancy complications                 (1 point)  [ ] Pneumonia or serious lung disease                                               [ ] Unexplained or recurrent                     (1 Point)           (in the previous month)                               (1 Point)  [ ] Abnormal pulmonary function test                     (1 Point)                 SURGERY RELATED RISK FACTORS  [ ] Acute myocardial infarction                              (1 Point)                 [ ]  Section                                             (1 Point)  [ ] Congestive heart failure (in the previous month)  (1 Point)               [ ] Minor surgery                                                  (1 Point)   [ ] Inflammatory bowel disease                             (1 Point)                 [ ] Arthroscopic surgery                                        (2 Points)  [ ] Central venous access                                      (2 Points)                [ ] General surgery lasting more than 45 minutes   (2 Points)       [ ] Stroke (in the previous month)                          (5 Points)               [ ] Elective arthroplasty                                         (5 Points)                                                                                                                                               HEMATOLOGY RELATED FACTORS                                                 TRAUMA RELATED RISK FACTORS  [ ] Prior episodes of VTE                                     (3 Points)                [ ] Fracture of the hip, pelvis, or leg                       (5 Points)  [ ] Positive family history for VTE                         (3 Points)                 [ ] Acute spinal cord injury (in the previous month)  (5 Points)  [ ] Prothrombin 34281 A                                     (3 Points)                 [ ] Paralysis  (less than 1 month)                             (5 Points)  [ ] Factor V Leiden                                             (3 Points)                  [ ] Multiple Trauma within 1 month                        (5 Points)  [ ] Lupus anticoagulants                                     (3 Points)                                                           [ ] Anticardiolipin antibodies                               (3 Points)                                                       [ ] High homocysteine in the blood                      (3 Points)                                             [ ] Other congenital or acquired thrombophilia      (3 Points)                                                [ ] Heparin induced thrombocytopenia                  (3 Points)                                          Total Score [    3      ]    Caprini Score 0 - 2:  Low Risk, No VTE Prophylaxis required for most patients, encourage ambulation  Caprini Score 3 - 6:  At Risk, pharmacologic VTE prophylaxis is indicated for most patients (in the absence of a contraindication)  Caprini Score Greater than or = 7:  High Risk, pharmacologic VTE prophylaxis is indicated for most patients (in the absence of a contraindication)

## 2021-11-01 NOTE — ED PROVIDER NOTE - OBJECTIVE STATEMENT
76 yr old female with hx of HLD, hypothyroidism, peripheral neuropathy, anxiety, recently admitted to [facility] and d/c on 10/ 30 due to UTI, was given ceftriaxone for  3days and then dc with cefuroxime, 76 yr old female with hx of HLD, hypothyroidism, peripheral neuropathy, anxiety, recently admitted to [facility] and d/c on 10/ 30 due to UTI, was given ceftriaxone for  3days and then dc with cefuroxime, presents today with generalized weakness, unable to care for herself, and confusion since discharged 2 days ago. Pt AOX 3 in ED. Denies any current new pain.

## 2021-11-01 NOTE — ED PROVIDER NOTE - ATTENDING CONTRIBUTION TO CARE
Dr. Martinez: I performed a face to face bedside interview with patient regarding history of present illness, review of symptoms and past medical history. I completed an independent physical exam.  I have discussed patient's plan of care with PA.   I agree with note as stated above, having amended the EMR as needed to reflect my findings.   This includes HISTORY OF PRESENT ILLNESS, HIV, PAST MEDICAL/SURGICAL/FAMILY/SOCIAL HISTORY, ALLERGIES AND HOME MEDICATIONS, REVIEW OF SYSTEMS, PHYSICAL EXAM, and any PROGRESS NOTES during the time I functioned as the attending physician for this patient.  Gen:  Ill appearing in NAD  Head:  NC/AT  HEENT: pupils perrl,no pharyngeal erythema, uvula midline, oral mucosa dry  Cardiac: S1S2, RRR  Abd: Soft, non tender  Resp: No distress, CTA   musculoskeletal:: no deformities, no swelling, strength +5/+5  Skin: warm and dry as visualized, no rashes  Neuro: sheila LAU x 3  Psych:alert, cooperative, appropriate mood and affect for situation

## 2021-11-01 NOTE — H&P ADULT - NSHPSOCIALHISTORY_GEN_ALL_CORE
Lives at home with  and daughter  As per  recently accepted for homecare services and had a hard time procuring an aid, just recently had one assigned.  Pt uses a wheelchair and walker at baseline, able to perform some ADLs on her home.  Denies etoh, smokes about 1/2-1 ppd, denies drug use.

## 2021-11-01 NOTE — H&P ADULT - NSHPLABSRESULTS_GEN_ALL_CORE
LABS:                        16.5   17.25 )-----------( 419      ( 2021 14:35 )             50.4         137  |  104  |  12  ----------------------------<  124<H>  5.8<H>   |  23  |  0.97    Ca    9.3      2021 16:15    TPro  8.1  /  Alb  3.5  /  TBili  0.4  /  DBili  x   /  AST  53<H>  /  ALT  27  /  AlkPhos  78        Urinalysis Basic - ( 2021 16:15 )    Color: Yellow / Appearance: Clear / S.005 / pH: x  Gluc: x / Ketone: Small  / Bili: Negative / Urobili: Negative   Blood: x / Protein: 30 mg/dL / Nitrite: Negative   Leuk Esterase: Moderate / RBC: 26-50 /HPF / WBC >50 /HPF   Sq Epi: x / Non Sq Epi: Neg.-Few / Bacteria: Many /HPF       CAPILLARY BLOOD GLUCOSE            Urinalysis Basic - ( 2021 16:15 )    Color: Yellow / Appearance: Clear / S.005 / pH: x  Gluc: x / Ketone: Small  / Bili: Negative / Urobili: Negative   Blood: x / Protein: 30 mg/dL / Nitrite: Negative   Leuk Esterase: Moderate / RBC: 26-50 /HPF / WBC >50 /HPF   Sq Epi: x / Non Sq Epi: Neg.-Few / Bacteria: Many /HPF        RADIOLOGY & ADDITIONAL TESTS:  < from: CT Brain Stroke Protocol (21 @ 14:49) >    IMPRESSION:  No acute intracranial hemorrhage or acute territorial infarct.  If acute ischemia is a strong clinical concern, MRI exam is recommended for further evaluation if there is no contraindication.      Dr. Brewster notified 3:05 PM on 2021    --- End of Report ---              MELANIE BADILLO MD; Attending Radiologist  This document has been electronically signed. 2021  3:08PM    < end of copied text >          Consultant(s) Notes Reviewed:  [x ] YES  [ ] NO  Care Discussed with Consultants/Other Providers [ x] YES  [ ] NO  Imaging Personally Reviewed:  [ ] YES  [ ] NO

## 2021-11-01 NOTE — ED ADULT NURSE NOTE - NSIMPLEMENTINTERV_GEN_ALL_ED
Implemented All Fall with Harm Risk Interventions:  [place] to call system. Call bell, personal items and telephone within reach. Instruct patient to call for assistance. Room bathroom lighting operational. Non-slip footwear when patient is off stretcher. Physically safe environment: no spills, clutter or unnecessary equipment. Stretcher in lowest position, wheels locked, appropriate side rails in place. Provide visual cue, wrist band, yellow gown, etc. Monitor gait and stability. Monitor for mental status changes and reorient to person, place, and time. Review medications for side effects contributing to fall risk. Reinforce activity limits and safety measures with patient and family. Provide visual clues: red socks.

## 2021-11-02 LAB
ANION GAP SERPL CALC-SCNC: 8 MMOL/L — SIGNIFICANT CHANGE UP (ref 5–17)
BUN SERPL-MCNC: 10 MG/DL — SIGNIFICANT CHANGE UP (ref 7–23)
CALCIUM SERPL-MCNC: 9.2 MG/DL — SIGNIFICANT CHANGE UP (ref 8.4–10.5)
CHLORIDE SERPL-SCNC: 108 MMOL/L — SIGNIFICANT CHANGE UP (ref 96–108)
CO2 SERPL-SCNC: 28 MMOL/L — SIGNIFICANT CHANGE UP (ref 22–31)
COVID-19 SPIKE DOMAIN AB INTERP: POSITIVE
COVID-19 SPIKE DOMAIN ANTIBODY RESULT: >250 U/ML — HIGH
CREAT SERPL-MCNC: 1.05 MG/DL — SIGNIFICANT CHANGE UP (ref 0.5–1.3)
CULTURE RESULTS: SIGNIFICANT CHANGE UP
GLUCOSE SERPL-MCNC: 102 MG/DL — HIGH (ref 70–99)
HCT VFR BLD CALC: 41.8 % — SIGNIFICANT CHANGE UP (ref 34.5–45)
HGB BLD-MCNC: 13.2 G/DL — SIGNIFICANT CHANGE UP (ref 11.5–15.5)
LACTATE SERPL-SCNC: 1.4 MMOL/L — SIGNIFICANT CHANGE UP (ref 0.7–2)
MCHC RBC-ENTMCNC: 28.1 PG — SIGNIFICANT CHANGE UP (ref 27–34)
MCHC RBC-ENTMCNC: 31.6 GM/DL — LOW (ref 32–36)
MCV RBC AUTO: 88.9 FL — SIGNIFICANT CHANGE UP (ref 80–100)
NRBC # BLD: 0 /100 WBCS — SIGNIFICANT CHANGE UP (ref 0–0)
PLATELET # BLD AUTO: 357 K/UL — SIGNIFICANT CHANGE UP (ref 150–400)
POTASSIUM SERPL-MCNC: 3.9 MMOL/L — SIGNIFICANT CHANGE UP (ref 3.5–5.3)
POTASSIUM SERPL-SCNC: 3.9 MMOL/L — SIGNIFICANT CHANGE UP (ref 3.5–5.3)
RBC # BLD: 4.7 M/UL — SIGNIFICANT CHANGE UP (ref 3.8–5.2)
RBC # FLD: 14.2 % — SIGNIFICANT CHANGE UP (ref 10.3–14.5)
SARS-COV-2 IGG+IGM SERPL QL IA: >250 U/ML — HIGH
SARS-COV-2 IGG+IGM SERPL QL IA: POSITIVE
SODIUM SERPL-SCNC: 144 MMOL/L — SIGNIFICANT CHANGE UP (ref 135–145)
SPECIMEN SOURCE: SIGNIFICANT CHANGE UP
WBC # BLD: 13.85 K/UL — HIGH (ref 3.8–10.5)
WBC # FLD AUTO: 13.85 K/UL — HIGH (ref 3.8–10.5)

## 2021-11-02 PROCEDURE — 99233 SBSQ HOSP IP/OBS HIGH 50: CPT

## 2021-11-02 RX ORDER — OXYCODONE AND ACETAMINOPHEN 5; 325 MG/1; MG/1
1 TABLET ORAL ONCE
Refills: 0 | Status: DISCONTINUED | OUTPATIENT
Start: 2021-11-02 | End: 2021-11-02

## 2021-11-02 RX ORDER — ACETAMINOPHEN 500 MG
650 TABLET ORAL EVERY 6 HOURS
Refills: 0 | Status: DISCONTINUED | OUTPATIENT
Start: 2021-11-02 | End: 2021-11-04

## 2021-11-02 RX ORDER — CIPROFLOXACIN LACTATE 400MG/40ML
400 VIAL (ML) INTRAVENOUS ONCE
Refills: 0 | Status: COMPLETED | OUTPATIENT
Start: 2021-11-02 | End: 2021-11-02

## 2021-11-02 RX ORDER — OXYCODONE AND ACETAMINOPHEN 5; 325 MG/1; MG/1
1 TABLET ORAL EVERY 12 HOURS
Refills: 0 | Status: DISCONTINUED | OUTPATIENT
Start: 2021-11-02 | End: 2021-11-04

## 2021-11-02 RX ORDER — CIPROFLOXACIN LACTATE 400MG/40ML
VIAL (ML) INTRAVENOUS
Refills: 0 | Status: DISCONTINUED | OUTPATIENT
Start: 2021-11-02 | End: 2021-11-03

## 2021-11-02 RX ORDER — CIPROFLOXACIN LACTATE 400MG/40ML
400 VIAL (ML) INTRAVENOUS EVERY 12 HOURS
Refills: 0 | Status: DISCONTINUED | OUTPATIENT
Start: 2021-11-02 | End: 2021-11-03

## 2021-11-02 RX ADMIN — OXYCODONE AND ACETAMINOPHEN 1 TABLET(S): 5; 325 TABLET ORAL at 07:00

## 2021-11-02 RX ADMIN — Medication 1 TABLET(S): at 17:49

## 2021-11-02 RX ADMIN — OXYCODONE AND ACETAMINOPHEN 1 TABLET(S): 5; 325 TABLET ORAL at 22:20

## 2021-11-02 RX ADMIN — OXYCODONE AND ACETAMINOPHEN 1 TABLET(S): 5; 325 TABLET ORAL at 14:53

## 2021-11-02 RX ADMIN — ENOXAPARIN SODIUM 40 MILLIGRAM(S): 100 INJECTION SUBCUTANEOUS at 12:59

## 2021-11-02 RX ADMIN — OXYCODONE AND ACETAMINOPHEN 1 TABLET(S): 5; 325 TABLET ORAL at 07:37

## 2021-11-02 RX ADMIN — OXYCODONE AND ACETAMINOPHEN 1 TABLET(S): 5; 325 TABLET ORAL at 21:22

## 2021-11-02 RX ADMIN — Medication 75 MICROGRAM(S): at 05:30

## 2021-11-02 RX ADMIN — SODIUM CHLORIDE 75 MILLILITER(S): 9 INJECTION INTRAMUSCULAR; INTRAVENOUS; SUBCUTANEOUS at 13:00

## 2021-11-02 RX ADMIN — Medication 1 PATCH: at 12:59

## 2021-11-02 RX ADMIN — Medication 1 PATCH: at 17:48

## 2021-11-02 RX ADMIN — OXYCODONE AND ACETAMINOPHEN 1 TABLET(S): 5; 325 TABLET ORAL at 14:21

## 2021-11-02 RX ADMIN — DULOXETINE HYDROCHLORIDE 20 MILLIGRAM(S): 30 CAPSULE, DELAYED RELEASE ORAL at 12:59

## 2021-11-02 RX ADMIN — SIMVASTATIN 10 MILLIGRAM(S): 20 TABLET, FILM COATED ORAL at 21:19

## 2021-11-02 RX ADMIN — Medication 200 MILLIGRAM(S): at 17:49

## 2021-11-02 RX ADMIN — Medication 0.5 MILLIGRAM(S): at 10:13

## 2021-11-02 RX ADMIN — GABAPENTIN 100 MILLIGRAM(S): 400 CAPSULE ORAL at 14:21

## 2021-11-02 RX ADMIN — Medication 200 MILLIGRAM(S): at 08:15

## 2021-11-02 RX ADMIN — GABAPENTIN 100 MILLIGRAM(S): 400 CAPSULE ORAL at 21:19

## 2021-11-02 RX ADMIN — SODIUM CHLORIDE 75 MILLILITER(S): 9 INJECTION INTRAMUSCULAR; INTRAVENOUS; SUBCUTANEOUS at 10:13

## 2021-11-02 RX ADMIN — GABAPENTIN 100 MILLIGRAM(S): 400 CAPSULE ORAL at 05:30

## 2021-11-02 NOTE — PROGRESS NOTE ADULT - ASSESSMENT
76 year old female with hld, hypothyroid, peripheral neuropathy, anxiety, s/p recent admission for UTI presents with worsening symptoms, failure to thrive, dehydration.    # Severe Sepsis 2/2 UTI  # Metabolic Encephalopathy  pt met SIRS criteria with leukocytosis and tachycardia, lactate 2.3, mental status changes. received meropenem in the ER.  s/p 1950 ml NS bolus in ED  pt reports foul smelling urine, suprapubic discomfort  CTH in ED unremarkable  received one dose of meropenem 500 in ED  previous urine culture grew citrobacter spp (from previous admission)  will cover with ceftriaxone 1 gram q24 hours  - previous urine culture sensitive. If decompensates, broaden to cefepime.   follow up urine and blood cultures  currently afebrile in ED, continue to monitor fever curve and WBC    # Dehydration  # Lactic Acidosis  appears clinically dehydrated  received 1950 ml NS bolus  continue gentle hydration for now      # Hyperkalemia  K 5.8 in ED  follow up repeat BMP  monitor for now    # Stage 2 Sacral Decubiti  normal saline cleanse and cream to bilateral buttock wounds  frequent rounding to ensure skin is clean and dry  turn and position  nutritional support    # HLD  chronic condition  continue simvastatin    # Peripheral Neuropathy  chronic condition  continue neurontin    # Rheumatoid Arthritis  chronic condition  pt takes oxycodone BID prn    # Hypothyroid  chronic condition  continue levothyroxine    # Anxiety  chronic condition  continue home xanax and cymbalta    # Nicotine Addiction  encourage smoking cessation  smokes about 1/2-1 ppd  will order nicotine patch daily    # Prophylactic Measure  lovenox    CAPRINI SCORE [CLOT]    AGE RELATED RISK FACTORS                                                       MOBILITY RELATED FACTORS  [ ] Age 41-60 years                                            (1 Point)                  [ ] Bed rest                                                        (1 Point)  [x ] Age: 61-74 years                                           (2 Points)                 [ ] Plaster cast                                                   (2 Points)  [ ] Age= 75 years                                              (3 Points)                 [ ] Bed bound for more than 72 hours                 (2 Points)    DISEASE RELATED RISK FACTORS                                               GENDER SPECIFIC FACTORS  [ ] Edema in the lower extremities                       (1 Point)                  [ ] Pregnancy                                                     (1 Point)  [ ] Varicose veins                                               (1 Point)                  [ ] Post-partum < 6 weeks                                   (1 Point)             [x ] BMI > 25 Kg/m2                                            (1 Point)                  [ ] Hormonal therapy  or oral contraception          (1 Point)                 [ ] Sepsis (in the previous month)                        (1 Point)                  [ ] History of pregnancy complications                 (1 point)  [ ] Pneumonia or serious lung disease                                               [ ] Unexplained or recurrent                     (1 Point)           (in the previous month)                               (1 Point)  [ ] Abnormal pulmonary function test                     (1 Point)                 SURGERY RELATED RISK FACTORS  [ ] Acute myocardial infarction                              (1 Point)                 [ ]  Section                                             (1 Point)  [ ] Congestive heart failure (in the previous month)  (1 Point)               [ ] Minor surgery                                                  (1 Point)   [ ] Inflammatory bowel disease                             (1 Point)                 [ ] Arthroscopic surgery                                        (2 Points)  [ ] Central venous access                                      (2 Points)                [ ] General surgery lasting more than 45 minutes   (2 Points)       [ ] Stroke (in the previous month)                          (5 Points)               [ ] Elective arthroplasty                                         (5 Points)                                                                                                                                               HEMATOLOGY RELATED FACTORS                                                 TRAUMA RELATED RISK FACTORS  [ ] Prior episodes of VTE                                     (3 Points)                [ ] Fracture of the hip, pelvis, or leg                       (5 Points)  [ ] Positive family history for VTE                         (3 Points)                 [ ] Acute spinal cord injury (in the previous month)  (5 Points)  [ ] Prothrombin 58968 A                                     (3 Points)                 [ ] Paralysis  (less than 1 month)                             (5 Points)  [ ] Factor V Leiden                                             (3 Points)                  [ ] Multiple Trauma within 1 month                        (5 Points)  [ ] Lupus anticoagulants                                     (3 Points)                                                           [ ] Anticardiolipin antibodies                               (3 Points)                                                       [ ] High homocysteine in the blood                      (3 Points)                                             [ ] Other congenital or acquired thrombophilia      (3 Points)                                                [ ] Heparin induced thrombocytopenia                  (3 Points)                                          Total Score [    3      ]    Caprini Score 0 - 2:  Low Risk, No VTE Prophylaxis required for most patients, encourage ambulation  Caprini Score 3 - 6:  At Risk, pharmacologic VTE prophylaxis is indicated for most patients (in the absence of a contraindication)  Caprini Score Greater than or = 7:  High Risk, pharmacologic VTE prophylaxis is indicated for most patients (in the absence of a contraindication)                                     76 year old female with hld, hypothyroid, peripheral neuropathy, anxiety, s/p recent admission for UTI presents with worsening symptoms, failure to thrive, dehydration.    # Severe Sepsis 2/2 UTI  # Metabolic Encephalopathy  - sepsis improved  - T max 100 overnight. monitor fever curvee  CTH in ED unremarkable  cont with IV cipro and follow up with urine cx and blood culture  urine culture from previous admission reviewed, grew citrobacter spp. sensitivity reviewed    # Dehydration - improved  # Lactic Acidosis- resolved  - cont to encourage fluid intake     # Hyperkalemia- resolved    # Stage 2 Sacral Decubiti  turn and position  nutritional support  local wound care     # HLD  chronic condition  continue simvastatin    # Peripheral Neuropathy  chronic condition  continue neurontin    # Rheumatoid Arthritis  chronic condition  pt takes oxycodone BID prn    # Hypothyroid  chronic condition  continue levothyroxine    # Anxiety  chronic condition  continue home xanax and cymbalta    # Nicotine Addiction  encourage smoking cessation  smokes about 1/2-1 ppd  nicotine patch     # Prophylactic Measure  lovenox

## 2021-11-02 NOTE — DIETITIAN INITIAL EVALUATION ADULT. - ORAL INTAKE PTA/DIET HISTORY
Pt reports eating 2x a day prior to hospital admission. Pt states eating whatever her  brings home which is [employer] 3x per week. Pt takes multivitamin at home. NKFA. Pt reports eating 2x a day prior to hospital admission. Pt does not follow any specific diet, states eating whatever her  brings home which is [employer] 3x per week. Pt takes multivitamin at home. NKFA.

## 2021-11-02 NOTE — GOALS OF CARE CONVERSATION - ADVANCED CARE PLANNING - CONVERSATION DETAILS
Met with patient at bedside. She is A&Ox3. Pt. had signed a Full Code MOLST MAY 2021, I reviewed this with her. She continues to want Full Code, in the event of cardiac arrest or cardiopulmonary decompensation.

## 2021-11-02 NOTE — PROGRESS NOTE ADULT - SUBJECTIVE AND OBJECTIVE BOX
Patient is a 76y old  Female who presents with a chief complaint of UTI (2021 10:34)      Subjective and overnight events:  Patient seen and examined at bedside.    ALLERGIES:  epinephrine (Unknown)  norepinephrine (Unknown)    MEDICATIONS  (STANDING):  ciprofloxacin   IVPB 400 milliGRAM(s) IV Intermittent every 12 hours  ciprofloxacin   IVPB      DULoxetine 20 milliGRAM(s) Oral daily  enoxaparin Injectable 40 milliGRAM(s) SubCutaneous daily  gabapentin 100 milliGRAM(s) Oral three times a day  influenza  Vaccine (HIGH DOSE) 0.7 milliLiter(s) IntraMuscular once  levothyroxine 75 MICROGram(s) Oral daily  multivitamin 1 Tablet(s) Oral daily  nicotine -  14 mG/24Hr(s) Patch 1 patch Transdermal daily  simvastatin 10 milliGRAM(s) Oral at bedtime  sodium chloride 0.9%. 1000 milliLiter(s) (75 mL/Hr) IV Continuous <Continuous>    MEDICATIONS  (PRN):  ALPRAZolam 0.5 milliGRAM(s) Oral daily PRN anxiety    Vital Signs Last 24 Hrs  T(F): 98.2 (2021 05:02), Max: 100 (2021 18:20)  HR: 91 (2021 05:28) (75 - 105)  BP: 146/83 (2021 05:28) (145/120 - 220/70)  RR: 20 (2021 05:02) (16 - 26)  SpO2: 97% (2021 05:02) (96% - 100%)  I&O's Summary    2021 07:01  -  2021 07:00  --------------------------------------------------------  IN: 240 mL / OUT: 400 mL / NET: -160 mL    2021 07:01  -  2021 14:02  --------------------------------------------------------  IN: 240 mL / OUT: 450 mL / NET: -210 mL      PHYSICAL EXAM:  General: NAD, A/O x 3  ENT: MMM  Neck: Supple, No JVD  Lungs: Clear to auscultation bilaterally  Cardio: RRR, S1/S2, No murmurs  Abdomen: Soft, Nontender, Nondistended; Bowel sounds present  Extremities: No calf tenderness, No pitting edema    LABS:                        13.2   13.85 )-----------( 357      ( 2021 06:21 )             41.8         144  |  108  |  10  ----------------------------<  102  3.9   |  28  |  1.05    Ca    9.2      2021 06:21    TPro  8.1  /  Alb  3.5  /  TBili  0.4  /  DBili  x   /  AST  53  /  ALT  27  /  AlkPhos  78      eGFR if Non African American: 52 mL/min/1.73M2 (21 @ 06:21)  eGFR if : 61 mL/min/1.73M2 (21 @ 06:21)      Lactate, Blood: 1.4 mmol/L ( @ 06:21)  Lactate, Blood: 1.6 mmol/L ( @ 20:20)  Lactate, Blood: 1.3 mmol/L ( @ 17:50)  Lactate, Blood: 2.3 mmol/L ( @ 14:35)                          Urinalysis Basic - ( 2021 16:15 )    Color: Yellow / Appearance: Clear / S.005 / pH: x  Gluc: x / Ketone: Small  / Bili: Negative / Urobili: Negative   Blood: x / Protein: 30 mg/dL / Nitrite: Negative   Leuk Esterase: Moderate / RBC: 26-50 /HPF / WBC >50 /HPF   Sq Epi: x / Non Sq Epi: Neg.-Few / Bacteria: Many /HPF        Culture - Urine (collected 27 Oct 2021 14:07)  Source: Clean Catch Clean Catch (Midstream)  Final Report (30 Oct 2021 12:13):    >100,000 CFU/ml Citrobacter freundii complex  Organism: Citrobacter freundii complex (30 Oct 2021 12:13)  Organism: Citrobacter freundii complex (30 Oct 2021 12:13)      -  Amikacin: S <=16      -  Amoxicillin/Clavulanic Acid: R <=8/4      -  Ampicillin: R <=8 These ampicillin results predict results for amoxicillin      -  Ampicillin/Sulbactam: R <=4/2 Enterobacter, Citrobacter, and Serratia may develop resistance during prolonged therapy (3-4 days)      -  Aztreonam: S <=4      -  Cefazolin: R >16      -  Cefepime: S <=2      -  Cefoxitin: R <=8      -  Ceftriaxone: S <=1 Enterobacter, Citrobacter, and Serratia may develop resistance during prolonged therapy      -  Ciprofloxacin: S <=0.25      -  Ertapenem: S <=0.5      -  Gentamicin: S <=2      -  Imipenem: S <=1      -  Levofloxacin: S <=0.5      -  Meropenem: S <=1      -  Nitrofurantoin: S <=32 Should not be used to treat pyelonephritis      -  Piperacillin/Tazobactam: S <=8      -  Tigecycline: S <=2      -  Tobramycin: S <=2      -  Trimethoprim/Sulfamethoxazole: S <=0.5/9.5      Method Type: DARIO    Culture - Blood (collected 27 Oct 2021 14:00)  Source: .Blood Blood-Peripheral  Final Report (2021 22:01):    No Growth Final    Culture - Blood (collected 27 Oct 2021 14:00)  Source: .Blood Blood-Peripheral  Final Report (2021 22:01):    No Growth Final      COVID-19 PCR: NotDetec (10-27- @ 14:00)      RADIOLOGY & ADDITIONAL TESTS:    Care Discussed with Consultants/Other Providers:    Patient is a 76y old  Female who presents with a chief complaint of UTI (2021 10:34)      Subjective and overnight events:  Patient seen and examined at bedside. pt reports feeling better. no fever, chills, sob, cp. No abd pain, n/v. + suprapubic pressure    ALLERGIES:  epinephrine (Unknown)  norepinephrine (Unknown)    MEDICATIONS  (STANDING):  ciprofloxacin   IVPB 400 milliGRAM(s) IV Intermittent every 12 hours  ciprofloxacin   IVPB      DULoxetine 20 milliGRAM(s) Oral daily  enoxaparin Injectable 40 milliGRAM(s) SubCutaneous daily  gabapentin 100 milliGRAM(s) Oral three times a day  influenza  Vaccine (HIGH DOSE) 0.7 milliLiter(s) IntraMuscular once  levothyroxine 75 MICROGram(s) Oral daily  multivitamin 1 Tablet(s) Oral daily  nicotine -  14 mG/24Hr(s) Patch 1 patch Transdermal daily  simvastatin 10 milliGRAM(s) Oral at bedtime  sodium chloride 0.9%. 1000 milliLiter(s) (75 mL/Hr) IV Continuous <Continuous>    MEDICATIONS  (PRN):  ALPRAZolam 0.5 milliGRAM(s) Oral daily PRN anxiety    Vital Signs Last 24 Hrs  T(F): 98.2 (2021 05:02), Max: 100 (2021 18:20)  HR: 91 (2021 05:28) (75 - 105)  BP: 146/83 (2021 05:28) (145/120 - 220/70)  RR: 20 (2021 05:02) (16 - 26)  SpO2: 97% (2021 05:02) (96% - 100%)  I&O's Summary    2021 07:01  -  2021 07:00  --------------------------------------------------------  IN: 240 mL / OUT: 400 mL / NET: -160 mL    2021 07:01  -  2021 14:02  --------------------------------------------------------  IN: 240 mL / OUT: 450 mL / NET: -210 mL      PHYSICAL EXAM:  General: NAD, A/O x 3  ENT: MMM  Neck: Supple, No JVD  Lungs: Clear to auscultation bilaterally  Cardio: RRR, S1/S2, No murmurs  Abdomen: Soft, Nontender, Nondistended; Bowel sounds present  Extremities: No calf tenderness, No pitting edema    LABS:                        13.2   13.85 )-----------( 357      ( 2021 06:21 )             41.8         144  |  108  |  10  ----------------------------<  102  3.9   |  28  |  1.05    Ca    9.2      2021 06:21    TPro  8.1  /  Alb  3.5  /  TBili  0.4  /  DBili  x   /  AST  53  /  ALT  27  /  AlkPhos  78      eGFR if Non African American: 52 mL/min/1.73M2 (21 @ 06:21)  eGFR if : 61 mL/min/1.73M2 (21 @ 06:21)      Lactate, Blood: 1.4 mmol/L ( @ 06:21)  Lactate, Blood: 1.6 mmol/L ( @ 20:20)  Lactate, Blood: 1.3 mmol/L ( @ 17:50)  Lactate, Blood: 2.3 mmol/L ( @ 14:35)        Urinalysis Basic - ( 2021 16:15 )    Color: Yellow / Appearance: Clear / S.005 / pH: x  Gluc: x / Ketone: Small  / Bili: Negative / Urobili: Negative   Blood: x / Protein: 30 mg/dL / Nitrite: Negative   Leuk Esterase: Moderate / RBC: 26-50 /HPF / WBC >50 /HPF   Sq Epi: x / Non Sq Epi: Neg.-Few / Bacteria: Many /HPF        Culture - Urine (collected 27 Oct 2021 14:07)  Source: Clean Catch Clean Catch (Midstream)  Final Report (30 Oct 2021 12:13):    >100,000 CFU/ml Citrobacter freundii complex  Organism: Citrobacter freundii complex (30 Oct 2021 12:13)  Organism: Citrobacter freundii complex (30 Oct 2021 12:13)      -  Amikacin: S <=16      -  Amoxicillin/Clavulanic Acid: R <=8/4      -  Ampicillin: R <=8 These ampicillin results predict results for amoxicillin      -  Ampicillin/Sulbactam: R <=4/2 Enterobacter, Citrobacter, and Serratia may develop resistance during prolonged therapy (3-4 days)      -  Aztreonam: S <=4      -  Cefazolin: R >16      -  Cefepime: S <=2      -  Cefoxitin: R <=8      -  Ceftriaxone: S <=1 Enterobacter, Citrobacter, and Serratia may develop resistance during prolonged therapy      -  Ciprofloxacin: S <=0.25      -  Ertapenem: S <=0.5      -  Gentamicin: S <=2      -  Imipenem: S <=1      -  Levofloxacin: S <=0.5      -  Meropenem: S <=1      -  Nitrofurantoin: S <=32 Should not be used to treat pyelonephritis      -  Piperacillin/Tazobactam: S <=8      -  Tigecycline: S <=2      -  Tobramycin: S <=2      -  Trimethoprim/Sulfamethoxazole: S <=0.5/9.5      Method Type: DARIO    Culture - Blood (collected 27 Oct 2021 14:00)  Source: .Blood Blood-Peripheral  Final Report (2021 22:01):    No Growth Final    Culture - Blood (collected 27 Oct 2021 14:00)  Source: .Blood Blood-Peripheral  Final Report (2021 22:01):    No Growth Final      COVID-19 PCR: NotDetec (10-27-21 @ 14:00)      RADIOLOGY & ADDITIONAL TESTS:  < from: Xray Chest 1 View- PORTABLE-Urgent (21 @ 14:55) >  IMPRESSION: No evidence for focal infiltrate or lobar consolidation.    --- End of Report ---    < end of copied text >      Care Discussed with Consultants/Other Providers:

## 2021-11-02 NOTE — DIETITIAN INITIAL EVALUATION ADULT. - OTHER INFO
76 year old female with hld, hypothyroid, peripheral neuropathy, anxiety, s/p recent admission for UTI presents with worsening symptoms, failure to thrive, dehydration.    Pt is tolerating current DASH TLC diet with fair PO intake, consuming 50% of foods as per nursing flowsheets. No chewing/swallowing difficulties as per patient. Pt denies GI issues. Last BM: 11/1 as per nursing. Pt educated on DASH TLC diet and explained [employer] foods contain a high amount of salt and saturated fats, discussed choosing healthier options. Food preferences obtained to optimize macronutrient intake. Stage 2 pressure ulcer located on R/L buttocks pt offered a multivitamin & nutrition supplement Ralph, provided education on the protein and vitamins in Ralph that help with wound healing. No edema as per nursing flowsheets.

## 2021-11-02 NOTE — DIETITIAN INITIAL EVALUATION ADULT. - PERTINENT MEDS FT
MEDICATIONS  (STANDING):  ciprofloxacin   IVPB 400 milliGRAM(s) IV Intermittent every 12 hours  ciprofloxacin   IVPB      DULoxetine 20 milliGRAM(s) Oral daily  enoxaparin Injectable 40 milliGRAM(s) SubCutaneous daily  gabapentin 100 milliGRAM(s) Oral three times a day  influenza  Vaccine (HIGH DOSE) 0.7 milliLiter(s) IntraMuscular once  levothyroxine 75 MICROGram(s) Oral daily  nicotine -  14 mG/24Hr(s) Patch 1 patch Transdermal daily  simvastatin 10 milliGRAM(s) Oral at bedtime  sodium chloride 0.9%. 1000 milliLiter(s) (75 mL/Hr) IV Continuous <Continuous>    MEDICATIONS  (PRN):  ALPRAZolam 0.5 milliGRAM(s) Oral daily PRN anxiety

## 2021-11-03 LAB
ANION GAP SERPL CALC-SCNC: 10 MMOL/L — SIGNIFICANT CHANGE UP (ref 5–17)
BASOPHILS # BLD AUTO: 0.06 K/UL — SIGNIFICANT CHANGE UP (ref 0–0.2)
BASOPHILS NFR BLD AUTO: 0.7 % — SIGNIFICANT CHANGE UP (ref 0–2)
BUN SERPL-MCNC: 9 MG/DL — SIGNIFICANT CHANGE UP (ref 7–23)
CALCIUM SERPL-MCNC: 8.9 MG/DL — SIGNIFICANT CHANGE UP (ref 8.4–10.5)
CHLORIDE SERPL-SCNC: 110 MMOL/L — HIGH (ref 96–108)
CO2 SERPL-SCNC: 24 MMOL/L — SIGNIFICANT CHANGE UP (ref 22–31)
CREAT SERPL-MCNC: 1.02 MG/DL — SIGNIFICANT CHANGE UP (ref 0.5–1.3)
EOSINOPHIL # BLD AUTO: 0.17 K/UL — SIGNIFICANT CHANGE UP (ref 0–0.5)
EOSINOPHIL NFR BLD AUTO: 1.9 % — SIGNIFICANT CHANGE UP (ref 0–6)
GLUCOSE SERPL-MCNC: 86 MG/DL — SIGNIFICANT CHANGE UP (ref 70–99)
HCT VFR BLD CALC: 37.4 % — SIGNIFICANT CHANGE UP (ref 34.5–45)
HGB BLD-MCNC: 11.6 G/DL — SIGNIFICANT CHANGE UP (ref 11.5–15.5)
IMM GRANULOCYTES NFR BLD AUTO: 0.3 % — SIGNIFICANT CHANGE UP (ref 0–1.5)
LYMPHOCYTES # BLD AUTO: 4.51 K/UL — HIGH (ref 1–3.3)
LYMPHOCYTES # BLD AUTO: 51.6 % — HIGH (ref 13–44)
MCHC RBC-ENTMCNC: 28.4 PG — SIGNIFICANT CHANGE UP (ref 27–34)
MCHC RBC-ENTMCNC: 31 GM/DL — LOW (ref 32–36)
MCV RBC AUTO: 91.7 FL — SIGNIFICANT CHANGE UP (ref 80–100)
MONOCYTES # BLD AUTO: 0.66 K/UL — SIGNIFICANT CHANGE UP (ref 0–0.9)
MONOCYTES NFR BLD AUTO: 7.6 % — SIGNIFICANT CHANGE UP (ref 2–14)
NEUTROPHILS # BLD AUTO: 3.31 K/UL — SIGNIFICANT CHANGE UP (ref 1.8–7.4)
NEUTROPHILS NFR BLD AUTO: 37.9 % — LOW (ref 43–77)
NRBC # BLD: 0 /100 WBCS — SIGNIFICANT CHANGE UP (ref 0–0)
PLATELET # BLD AUTO: 259 K/UL — SIGNIFICANT CHANGE UP (ref 150–400)
POTASSIUM SERPL-MCNC: 3.4 MMOL/L — LOW (ref 3.5–5.3)
POTASSIUM SERPL-SCNC: 3.4 MMOL/L — LOW (ref 3.5–5.3)
RBC # BLD: 4.08 M/UL — SIGNIFICANT CHANGE UP (ref 3.8–5.2)
RBC # FLD: 14.3 % — SIGNIFICANT CHANGE UP (ref 10.3–14.5)
SODIUM SERPL-SCNC: 144 MMOL/L — SIGNIFICANT CHANGE UP (ref 135–145)
WBC # BLD: 8.74 K/UL — SIGNIFICANT CHANGE UP (ref 3.8–10.5)
WBC # FLD AUTO: 8.74 K/UL — SIGNIFICANT CHANGE UP (ref 3.8–10.5)

## 2021-11-03 PROCEDURE — 99233 SBSQ HOSP IP/OBS HIGH 50: CPT

## 2021-11-03 RX ORDER — ALPRAZOLAM 0.25 MG
0 TABLET ORAL
Qty: 0 | Refills: 0 | DISCHARGE

## 2021-11-03 RX ORDER — POTASSIUM CHLORIDE 20 MEQ
40 PACKET (EA) ORAL ONCE
Refills: 0 | Status: COMPLETED | OUTPATIENT
Start: 2021-11-03 | End: 2021-11-03

## 2021-11-03 RX ORDER — TRAMADOL HYDROCHLORIDE 50 MG/1
50 TABLET ORAL ONCE
Refills: 0 | Status: DISCONTINUED | OUTPATIENT
Start: 2021-11-03 | End: 2021-11-03

## 2021-11-03 RX ORDER — CIPROFLOXACIN LACTATE 400MG/40ML
500 VIAL (ML) INTRAVENOUS EVERY 12 HOURS
Refills: 0 | Status: COMPLETED | OUTPATIENT
Start: 2021-11-03 | End: 2021-11-04

## 2021-11-03 RX ADMIN — GABAPENTIN 100 MILLIGRAM(S): 400 CAPSULE ORAL at 05:26

## 2021-11-03 RX ADMIN — OXYCODONE AND ACETAMINOPHEN 1 TABLET(S): 5; 325 TABLET ORAL at 11:29

## 2021-11-03 RX ADMIN — GABAPENTIN 100 MILLIGRAM(S): 400 CAPSULE ORAL at 21:17

## 2021-11-03 RX ADMIN — DULOXETINE HYDROCHLORIDE 20 MILLIGRAM(S): 30 CAPSULE, DELAYED RELEASE ORAL at 11:37

## 2021-11-03 RX ADMIN — ENOXAPARIN SODIUM 40 MILLIGRAM(S): 100 INJECTION SUBCUTANEOUS at 11:37

## 2021-11-03 RX ADMIN — Medication 200 MILLIGRAM(S): at 05:26

## 2021-11-03 RX ADMIN — GABAPENTIN 100 MILLIGRAM(S): 400 CAPSULE ORAL at 13:48

## 2021-11-03 RX ADMIN — TRAMADOL HYDROCHLORIDE 50 MILLIGRAM(S): 50 TABLET ORAL at 16:41

## 2021-11-03 RX ADMIN — OXYCODONE AND ACETAMINOPHEN 1 TABLET(S): 5; 325 TABLET ORAL at 10:29

## 2021-11-03 RX ADMIN — Medication 650 MILLIGRAM(S): at 21:17

## 2021-11-03 RX ADMIN — Medication 40 MILLIEQUIVALENT(S): at 08:38

## 2021-11-03 RX ADMIN — SIMVASTATIN 10 MILLIGRAM(S): 20 TABLET, FILM COATED ORAL at 21:17

## 2021-11-03 RX ADMIN — Medication 1 TABLET(S): at 11:37

## 2021-11-03 RX ADMIN — Medication 500 MILLIGRAM(S): at 18:12

## 2021-11-03 RX ADMIN — Medication 75 MICROGRAM(S): at 05:26

## 2021-11-03 RX ADMIN — Medication 650 MILLIGRAM(S): at 22:17

## 2021-11-03 RX ADMIN — TRAMADOL HYDROCHLORIDE 50 MILLIGRAM(S): 50 TABLET ORAL at 16:07

## 2021-11-03 NOTE — PHYSICAL THERAPY INITIAL EVALUATION ADULT - IMPAIRMENTS CONTRIBUTING IMPAIRED BED MOBILITY, REHAB EVAL
impaired balance/impaired coordination/decreased flexibility/impaired postural control/decreased ROM/decreased strength

## 2021-11-03 NOTE — PHYSICAL THERAPY INITIAL EVALUATION ADULT - RANGE OF MOTION EXAMINATION, REHAB EVAL
mod decreased hip and back flexion/bilateral upper extremity ROM was WFL (within functional limits)/bilateral lower extremity ROM was WFL (within functional limits)

## 2021-11-03 NOTE — PROGRESS NOTE ADULT - SUBJECTIVE AND OBJECTIVE BOX
Patient is a 76y old  Female who presents with a chief complaint of UTI (2021 14:02)      Subjective and overnight events:  Patient seen and examined at bedside. pt reports bladder pressure resolved. feels better today. no fever, chills, sob, cp, abd pain, n/v. tolerating diet and more motivated to work with PT today.     ALLERGIES:  epinephrine (Unknown)  norepinephrine (Unknown)    MEDICATIONS  (STANDING):  ciprofloxacin     Tablet 500 milliGRAM(s) Oral every 12 hours  DULoxetine 20 milliGRAM(s) Oral daily  enoxaparin Injectable 40 milliGRAM(s) SubCutaneous daily  gabapentin 100 milliGRAM(s) Oral three times a day  influenza  Vaccine (HIGH DOSE) 0.7 milliLiter(s) IntraMuscular once  levothyroxine 75 MICROGram(s) Oral daily  multivitamin 1 Tablet(s) Oral daily  nicotine -  14 mG/24Hr(s) Patch 1 patch Transdermal daily  simvastatin 10 milliGRAM(s) Oral at bedtime  sodium chloride 0.9%. 1000 milliLiter(s) (75 mL/Hr) IV Continuous <Continuous>    MEDICATIONS  (PRN):  acetaminophen     Tablet .. 650 milliGRAM(s) Oral every 6 hours PRN Temp greater or equal to 38C (100.4F), Mild Pain (1 - 3)  ALPRAZolam 0.5 milliGRAM(s) Oral daily PRN anxiety  oxycodone    5 mG/acetaminophen 325 mG 1 Tablet(s) Oral every 12 hours PRN Severe Pain (7 - 10)    Vital Signs Last 24 Hrs  T(F): 97.6 (2021 05:35), Max: 98.7 (2021 16:21)  HR: 74 (2021 05:35) (74 - 93)  BP: 129/71 (2021 05:35) (125/66 - 140/75)  RR: 20 (2021 05:35) (18 - 20)  SpO2: 94% (2021 05:35) (94% - 98%)  I&O's Summary    2021 07:01  -  2021 07:00  --------------------------------------------------------  IN: 720 mL / OUT: 750 mL / NET: -30 mL      PHYSICAL EXAM:  General: NAD, A/O x 3  ENT: MMM  Neck: Supple, No JVD  Lungs: Clear to auscultation bilaterally  Cardio: RRR, S1/S2, No murmurs  Abdomen: Soft, Nontender, Nondistended; Bowel sounds present  Extremities: No calf tenderness, No pitting edema    LABS:                        11.6   8.74  )-----------( 259      ( 2021 06:20 )             37.4         144  |  110  |  9   ----------------------------<  86  3.4   |  24  |  1.02    Ca    8.9      2021 06:20    TPro  8.1  /  Alb  3.5  /  TBili  0.4  /  DBili  x   /  AST  53  /  ALT  27  /  AlkPhos  78      eGFR if Non African American: 54 mL/min/1.73M2 (21 @ 06:20)  eGFR if : 63 mL/min/1.73M2 (21 @ 06:20)      Lactate, Blood: 1.4 mmol/L ( @ 06:21)  Lactate, Blood: 1.6 mmol/L ( @ 20:20)  Lactate, Blood: 1.3 mmol/L ( @ 17:50)  Lactate, Blood: 2.3 mmol/L ( @ 14:35)              Urinalysis Basic - ( 2021 16:15 )    Color: Yellow / Appearance: Clear / S.005 / pH: x  Gluc: x / Ketone: Small  / Bili: Negative / Urobili: Negative   Blood: x / Protein: 30 mg/dL / Nitrite: Negative   Leuk Esterase: Moderate / RBC: 26-50 /HPF / WBC >50 /HPF   Sq Epi: x / Non Sq Epi: Neg.-Few / Bacteria: Many /HPF        Culture - Urine (collected 2021 16:15)  Source: Clean Catch Clean Catch (Midstream)  Final Report (2021 18:06):    <10,000 CFU/mL Normal Urogenital Nayeli    Culture - Blood (collected 2021 14:35)  Source: .Blood Blood-Peripheral  Preliminary Report (2021 19:02):    No growth to date.    Culture - Blood (collected 2021 14:35)  Source: .Blood Blood-Peripheral  Preliminary Report (2021 19:02):    No growth to date.    Culture - Urine (collected 27 Oct 2021 14:07)  Source: Clean Catch Clean Catch (Midstream)  Final Report (30 Oct 2021 12:13):    >100,000 CFU/ml Citrobacter freundii complex  Organism: Citrobacter freundii complex (30 Oct 2021 12:13)  Organism: Citrobacter freundii complex (30 Oct 2021 12:13)      -  Amikacin: S <=16      -  Amoxicillin/Clavulanic Acid: R <=8/4      -  Ampicillin: R <=8 These ampicillin results predict results for amoxicillin      -  Ampicillin/Sulbactam: R <=4/2 Enterobacter, Citrobacter, and Serratia may develop resistance during prolonged therapy (3-4 days)      -  Aztreonam: S <=4      -  Cefazolin: R >16      -  Cefepime: S <=2      -  Cefoxitin: R <=8      -  Ceftriaxone: S <=1 Enterobacter, Citrobacter, and Serratia may develop resistance during prolonged therapy      -  Ciprofloxacin: S <=0.25      -  Ertapenem: S <=0.5      -  Gentamicin: S <=2      -  Imipenem: S <=1      -  Levofloxacin: S <=0.5      -  Meropenem: S <=1      -  Nitrofurantoin: S <=32 Should not be used to treat pyelonephritis      -  Piperacillin/Tazobactam: S <=8      -  Tigecycline: S <=2      -  Tobramycin: S <=2      -  Trimethoprim/Sulfamethoxazole: S <=0.5/9.5      Method Type: DARIO    Culture - Blood (collected 27 Oct 2021 14:00)  Source: .Blood Blood-Peripheral  Final Report (2021 22:01):    No Growth Final    Culture - Blood (collected 27 Oct 2021 14:00)  Source: .Blood Blood-Peripheral  Final Report (2021 22:01):    No Growth Final      COVID-19 PCR: NotDetec (10-27-21 @ 14:00)      RADIOLOGY & ADDITIONAL TESTS:    Care Discussed with Consultants/Other Providers:

## 2021-11-03 NOTE — PROGRESS NOTE ADULT - ASSESSMENT
76 year old female with hld, hypothyroid, peripheral neuropathy, anxiety, s/p recent admission for UTI presents with worsening symptoms, failure to thrive, dehydration.    # SIRS - resolved  # Metabolic Encephalopathy- resolved  - pt met SIRS criteria on admission (WBC 17, , lactate 2.3) however, no clear source of infection identified.  - blood cx and urine cx negative   - CXR negative  - CTH in ED unremarkable  - PT eval    # Bacteuria   - pt recently treated for citrobacter UTI with rocephin on previous admission. UA on admission was positive, however, urine cx was negative.  - pt reports suprapubic pain/pressure improved with cipro, will let patient complete 3 days of abx, last day today.     # Dehydration - resolved now  # Lactic Acidosis suspect secondary to dehydration - resolved  - cont to encourage fluid intake     # Hyperkalemia- resolved    # Stage 2 Sacral Decubiti  turn and position  nutritional support  local wound care     # HLD  chronic condition  continue simvastatin    # Peripheral Neuropathy  chronic condition  continue neurontin    # Rheumatoid Arthritis  chronic condition  pt takes oxycodone BID prn    # Hypothyroid  chronic condition  continue levothyroxine    # Anxiety  chronic condition  continue home xanax and cymbalta    # Nicotine Addiction  encourage smoking cessation  smokes about 1/2-1 ppd  pt reports skin irration with nicotine patch. will switch to nicotine gum     # Prophylactic Measure  lovenox    dispo: possible GG for long term placement       11/2 spoke to pt's , states unable to take care of patient at home.          76 year old female with hld, hypothyroid, peripheral neuropathy, anxiety, s/p recent admission for UTI presents with worsening symptoms, failure to thrive, dehydration.    # SIRS - resolved  # Metabolic Encephalopathy- resolved  - pt met SIRS criteria on admission (WBC 17, , lactate 2.3) however, no clear source of infection identified.  - blood cx and urine cx negative   - CXR negative  - CTH in ED unremarkable  - PT eval    # Bacteuria   - pt recently treated for citrobacter UTI with rocephin on previous admission. UA on admission was positive, however, urine cx was negative.  - pt reports suprapubic pain/pressure improved with cipro, will let patient complete 3 days of abx, last day today.     # Dehydration - resolved now  # Lactic Acidosis suspect secondary to dehydration - resolved  - cont to encourage fluid intake     # Hyperkalemia- resolved    # Stage 2 Sacral Decubiti  turn and position  nutritional support  local wound care     # HLD  chronic condition  continue simvastatin    # Peripheral Neuropathy  chronic condition  continue neurontin    # Rheumatoid Arthritis  chronic condition  pt takes oxycodone BID prn    # Hypothyroid  chronic condition  continue levothyroxine    # Anxiety  chronic condition  continue home xanax and cymbalta    # Nicotine Addiction  encourage smoking cessation  smokes about 1/2-1 ppd  pt reports skin irration with nicotine patch. will switch to nicotine gum     # Prophylactic Measure  lovenox    dispo: possible GG for long term placement       11/2 spoke to pt's , Krystian [phone]         76 year old female with hld, hypothyroid, peripheral neuropathy, anxiety, s/p recent admission for UTI presents with worsening symptoms, failure to thrive, dehydration.    # SIRS - resolved  # Metabolic Encephalopathy- resolved  - pt met SIRS criteria on admission (WBC 17, , lactate 2.3) however, no clear source of infection identified.  - blood cx and urine cx negative   - CXR negative  - CTH in ED unremarkable  - PT eval    # Bacteuria   - pt recently treated for citrobacter UTI with rocephin on previous admission. UA on admission was positive, however, urine cx was negative.  - pt reports suprapubic pain/pressure improved with cipro, will let patient complete 3 days of abx, last day today.     # Dehydration - resolved now  # Lactic Acidosis suspect secondary to dehydration - resolved  - cont to encourage fluid intake     # Hyperkalemia- resolved    # Stage 2 Sacral Decubiti  turn and position  nutritional support  local wound care     # HLD  chronic condition  continue simvastatin    # Peripheral Neuropathy  chronic condition  continue neurontin    # Rheumatoid Arthritis  chronic condition  pt takes oxycodone BID prn    # Hypothyroid  chronic condition  continue levothyroxine    # Anxiety  chronic condition  continue home xanax and cymbalta    # Nicotine Addiction  encourage smoking cessation  smokes about 1/2-1 ppd  pt reports skin irration with nicotine patch. will switch to nicotine gum     # Prophylactic Measure  lovenox subq    dispo: pt's  and son could not take care of patient at home and could not find aides. pt also used up all her rehab days. pending long term placement.     11/3 spoke to pt's , Krystian [phone].          76 year old female with hld, hypothyroid, peripheral neuropathy, anxiety, s/p recent admission for UTI presents with worsening symptoms, failure to thrive, dehydration.    # SIRS - resolved  # Metabolic Encephalopathy- resolved  - pt met SIRS criteria on admission (WBC 17, , lactate 2.3) however, no clear source of infection identified.  - blood cx and urine cx negative   - CXR negative  - CTH in ED unremarkable  - PT eval    # Pyuria  - pt recently treated for citrobacter UTI with rocephin on previous admission. UA on admission was positive, however, urine cx was negative.  - pt reports suprapubic pain/pressure improved with cipro, finish 3 days of abx, last day today.     # Dehydration - resolved now  # Lactic Acidosis suspect secondary to dehydration - resolved  - cont to encourage fluid intake     # Hyperkalemia- resolved    # Stage 2 Sacral Decubiti  turn and position  nutritional support  local wound care     # HLD  chronic condition  continue simvastatin    # Peripheral Neuropathy  chronic condition  continue neurontin    # Rheumatoid Arthritis  chronic condition  pt takes oxycodone BID prn    # Hypothyroid  chronic condition  continue levothyroxine    # Anxiety  chronic condition  continue home xanax and cymbalta    # Nicotine Addiction  encourage smoking cessation  smokes about 1/2-1 ppd  pt reports skin irration with nicotine patch. will switch to nicotine gum     # Prophylactic Measure  lovenox subq    dispo: pt's  and son could not take care of patient at home and could not find aides. pt also used up all her rehab days. pending possible long term placement.     11/3 spoke to pt's , Krystian [phone].          76 year old female with hld, hypothyroid, peripheral neuropathy, anxiety, s/p recent admission for UTI presents with worsening symptoms, failure to thrive, dehydration.    # SIRS - resolved  # Metabolic Encephalopathy- resolved  - pt met SIRS criteria on admission (WBC 17, , lactate 2.3) however, no clear source of infection identified.  - blood cx and urine cx negative   - CXR negative  - CTH in ED unremarkable  - PT eval    # Pyuria  - pt recently treated for citrobacter UTI with rocephin on previous admission. UA on admission was positive, however, urine cx was negative.  - pt reports suprapubic pain/pressure improved with cipro, finish 3 days of abx, last day today.   - urology consult; pt with frequent recurrent UTI    # Dehydration - resolved now  # Lactic Acidosis suspect secondary to dehydration - resolved  - cont to encourage fluid intake     # Hyperkalemia- resolved    # Stage 2 Sacral Decubiti  turn and position  nutritional support  local wound care     # HLD  chronic condition  continue simvastatin    # Peripheral Neuropathy  chronic condition  continue neurontin    # Rheumatoid Arthritis  chronic condition  pt takes oxycodone BID prn    # Hypothyroid  chronic condition  continue levothyroxine    # Anxiety  chronic condition  continue home xanax and cymbalta    # Nicotine Addiction  encourage smoking cessation  smokes about 1/2-1 ppd  pt reports skin irration with nicotine patch. will switch to nicotine gum     # Prophylactic Measure  lovenox subq    dispo: pt's  and son could not take care of patient at home and could not find aides. pt also used up all her rehab days. pending possible long term placement.     11/3 spoke to pt's , Krystian [phone].          76 year old female with hld, hypothyroid, peripheral neuropathy, anxiety, s/p recent admission for UTI presents with worsening symptoms, failure to thrive, dehydration.    # SIRS - resolved  # Metabolic Encephalopathy- resolved  - pt met SIRS criteria on admission (WBC 17, , lactate 2.3) however, no clear source of infection identified.  - blood cx and urine cx negative   - CXR negative  - CTH in ED unremarkable  - PT eval    # Pyuria  - pt recently treated for citrobacter UTI with rocephin on previous admission. UA on admission was positive, however, urine cx was negative.  - pt reports suprapubic pain/pressure improved with cipro, finish 3 days of abx, last day today.     # Dehydration - resolved now  # Lactic Acidosis suspect secondary to dehydration - resolved  - cont to encourage fluid intake     # Hyperkalemia- resolved    # Stage 2 Sacral Decubiti  turn and position  nutritional support  local wound care     # HLD  chronic condition  continue simvastatin    # Peripheral Neuropathy  chronic condition  continue neurontin    # Rheumatoid Arthritis  chronic condition  pt takes oxycodone BID prn    # Hypothyroid  chronic condition  continue levothyroxine    # Anxiety  chronic condition  continue home xanax and cymbalta    # Nicotine Addiction  encourage smoking cessation  smokes about 1/2-1 ppd  pt reports skin irration with nicotine patch. will switch to nicotine gum     # Prophylactic Measure  lovenox subq    dispo: pt's  and son could not take care of patient at home and could not find aides. pt also used up all her rehab days. pending possible long term placement.     11/3 spoke to pt's , Krystian [phone].

## 2021-11-03 NOTE — PHYSICAL THERAPY INITIAL EVALUATION ADULT - ADDITIONAL COMMENTS
lives in private home with  who assists with ADLs and IADLs; patient unsure of number of stairs in home; limited gait, transfers to  at home

## 2021-11-04 ENCOUNTER — TRANSCRIPTION ENCOUNTER (OUTPATIENT)
Age: 76
End: 2021-11-04

## 2021-11-04 VITALS
SYSTOLIC BLOOD PRESSURE: 143 MMHG | TEMPERATURE: 99 F | HEART RATE: 77 BPM | RESPIRATION RATE: 20 BRPM | DIASTOLIC BLOOD PRESSURE: 78 MMHG | OXYGEN SATURATION: 96 %

## 2021-11-04 PROCEDURE — 81001 URINALYSIS AUTO W/SCOPE: CPT

## 2021-11-04 PROCEDURE — 36415 COLL VENOUS BLD VENIPUNCTURE: CPT

## 2021-11-04 PROCEDURE — 80053 COMPREHEN METABOLIC PANEL: CPT

## 2021-11-04 PROCEDURE — 93005 ELECTROCARDIOGRAM TRACING: CPT

## 2021-11-04 PROCEDURE — 86769 SARS-COV-2 COVID-19 ANTIBODY: CPT

## 2021-11-04 PROCEDURE — 87086 URINE CULTURE/COLONY COUNT: CPT

## 2021-11-04 PROCEDURE — 71045 X-RAY EXAM CHEST 1 VIEW: CPT

## 2021-11-04 PROCEDURE — 85027 COMPLETE CBC AUTOMATED: CPT

## 2021-11-04 PROCEDURE — 97162 PT EVAL MOD COMPLEX 30 MIN: CPT

## 2021-11-04 PROCEDURE — 83605 ASSAY OF LACTIC ACID: CPT

## 2021-11-04 PROCEDURE — 80048 BASIC METABOLIC PNL TOTAL CA: CPT

## 2021-11-04 PROCEDURE — 99239 HOSP IP/OBS DSCHRG MGMT >30: CPT

## 2021-11-04 PROCEDURE — 85025 COMPLETE CBC W/AUTO DIFF WBC: CPT

## 2021-11-04 PROCEDURE — 70450 CT HEAD/BRAIN W/O DYE: CPT | Mod: MA

## 2021-11-04 PROCEDURE — 87040 BLOOD CULTURE FOR BACTERIA: CPT

## 2021-11-04 PROCEDURE — 99285 EMERGENCY DEPT VISIT HI MDM: CPT | Mod: 25

## 2021-11-04 PROCEDURE — 87637 SARSCOV2&INF A&B&RSV AMP PRB: CPT

## 2021-11-04 RX ORDER — NICOTINE POLACRILEX 2 MG
1 GUM BUCCAL
Qty: 0 | Refills: 0 | DISCHARGE
Start: 2021-11-04

## 2021-11-04 RX ORDER — POTASSIUM CHLORIDE 20 MEQ
40 PACKET (EA) ORAL ONCE
Refills: 0 | Status: COMPLETED | OUTPATIENT
Start: 2021-11-04 | End: 2021-11-04

## 2021-11-04 RX ADMIN — Medication 75 MICROGRAM(S): at 05:28

## 2021-11-04 RX ADMIN — GABAPENTIN 100 MILLIGRAM(S): 400 CAPSULE ORAL at 13:01

## 2021-11-04 RX ADMIN — GABAPENTIN 100 MILLIGRAM(S): 400 CAPSULE ORAL at 05:28

## 2021-11-04 RX ADMIN — DULOXETINE HYDROCHLORIDE 20 MILLIGRAM(S): 30 CAPSULE, DELAYED RELEASE ORAL at 11:40

## 2021-11-04 RX ADMIN — Medication 1 TABLET(S): at 11:40

## 2021-11-04 RX ADMIN — Medication 0.5 MILLIGRAM(S): at 17:39

## 2021-11-04 RX ADMIN — ENOXAPARIN SODIUM 40 MILLIGRAM(S): 100 INJECTION SUBCUTANEOUS at 11:40

## 2021-11-04 RX ADMIN — Medication 40 MILLIEQUIVALENT(S): at 11:40

## 2021-11-04 RX ADMIN — Medication 500 MILLIGRAM(S): at 05:28

## 2021-11-04 RX ADMIN — OXYCODONE AND ACETAMINOPHEN 1 TABLET(S): 5; 325 TABLET ORAL at 09:15

## 2021-11-04 RX ADMIN — OXYCODONE AND ACETAMINOPHEN 1 TABLET(S): 5; 325 TABLET ORAL at 08:42

## 2021-11-04 NOTE — DISCHARGE NOTE NURSING/CASE MANAGEMENT/SOCIAL WORK - PATIENT PORTAL LINK FT
You can access the FollowMyHealth Patient Portal offered by [facility] by registering at the following website: [email or web address]. By joining [facility]’s FollowMyHealth portal, you will also be able to view your health information using other applications (apps) compatible with our system.

## 2021-11-04 NOTE — DISCHARGE NOTE PROVIDER - NSDCMRMEDTOKEN_GEN_ALL_CORE_FT
ALPRAZolam 0.5 mg oral tablet: 1 tab(s) orally 2 times a day, As Needed  cefuroxime 250 mg oral tablet: 1 tab(s) orally 2 times a day   Cymbalta 20 mg oral delayed release capsule: 1 cap(s) orally 2 times a day  gabapentin 100 mg oral capsule: 1 cap(s) orally 3 times a day  oxycodone-acetaminophen 5 mg-325 mg oral tablet: 1 tab(s) orally 4 times a day  simvastatin 10 mg oral tablet: 1 tab(s) orally once a day (at bedtime)  Synthroid 75 mcg (0.075 mg) oral tablet: 1 tab(s) orally once a day  Vitamin D2 1.25 mg (50,000 intl units) oral capsule: 1 cap(s) orally once a week   ALPRAZolam 0.5 mg oral tablet: 1 tab(s) orally 2 times a day, As Needed  Cymbalta 20 mg oral delayed release capsule: 1 cap(s) orally 2 times a day  gabapentin 100 mg oral capsule: 1 cap(s) orally 3 times a day  nicotine 14 mg/24 hr transdermal film, extended release: 1 patch transdermal once a day  oxycodone-acetaminophen 5 mg-325 mg oral tablet: 1 tab(s) orally 4 times a day  simvastatin 10 mg oral tablet: 1 tab(s) orally once a day (at bedtime)  Synthroid 75 mcg (0.075 mg) oral tablet: 1 tab(s) orally once a day  Vitamin D2 1.25 mg (50,000 intl units) oral capsule: 1 cap(s) orally once a week

## 2021-11-04 NOTE — PROGRESS NOTE ADULT - ATTENDING COMMENTS
76F with anxiety admitted for failure to thrive and deconditioning, in addition to dehydration. Improved with IVF. Patient does not have any days left for SHIVANI, however, patient's family wants her to go home and receive home PT. Case management to work on this discharge for today. Time spent on d/c 32 min. Emotional support provided to patient, including motivation to participate with PT in attempts to get stronger, as well as review of medication reconciliation for discharge.

## 2021-11-04 NOTE — PROGRESS NOTE ADULT - ASSESSMENT
76 year old female with hld, hypothyroid, peripheral neuropathy, anxiety, s/p recent admission for UTI presents with worsening symptoms, failure to thrive, dehydration.    # SIRS - resolved  # Metabolic Encephalopathy- resolved  - pt met SIRS criteria on admission (WBC 17, , lactate 2.3) however, no clear source of infection identified.  - blood cx and urine cx negative   - CXR negative  - CTH in ED unremarkable  -completed three days of ABX  - PT eval    # Pyuria  - pt recently treated for citrobacter UTI with rocephin on previous admission. UA on admission was positive, however, urine cx was negative.  - pt reports suprapubic pain/pressure improved with cipro, finish 3 days of ABX.    # Dehydration - resolved now  # Lactic Acidosis suspect secondary to dehydration - resolved  - cont to encourage fluid intake     # Hyperkalemia- resolved    # Stage 2 Sacral Decubiti  turn and position  nutritional support  local wound care     # HLD  chronic condition  continue simvastatin    # Peripheral Neuropathy  chronic condition  continue neurontin    # Rheumatoid Arthritis  chronic condition  pt takes oxycodone BID prn    # Hypothyroid  chronic condition  continue levothyroxine    # Anxiety  chronic condition  continue home xanax and cymbalta    # Nicotine Addiction  encourage smoking cessation  smokes about 1/2-1 ppd  pt reports skin irration with nicotine patch. will switch to nicotine gum     # Prophylactic Measure  lovenox subq    dispo: pt's  and son could not take care of patient at home and could not find aides. pt also used up all her rehab days. plan for short course of SHIVANI then home with aides.    spoke to pt's , Krystian [phone] and offered an update today 11/4

## 2021-11-04 NOTE — DISCHARGE NOTE PROVIDER - CARE PROVIDER_API CALL
Reyes, John A (MD)  Internal Medicine  [address], [address]  [place] [address]  Phone: [phone]  Fax: [phone]  Follow Up Time:

## 2021-11-04 NOTE — DISCHARGE NOTE NURSING/CASE MANAGEMENT/SOCIAL WORK - NSDCVIVACCINE_GEN_ALL_CORE_FT
Tdap; 14-Apr-2021 15:25; Chantelle Meehan (RN); [employer]; [id number] (Exp. Date: 21-Jul-2022); IntraMuscular; Deltoid Right.; 0.5 milliLiter(s); VIS (VIS Published: 09-May-2013, VIS Presented: 14-Apr-2021);

## 2021-11-04 NOTE — DISCHARGE NOTE PROVIDER - NSDCFUADDINST_GEN_ALL_CORE_FT
Stage 2 Sacral Decubiti  normal saline cleanse and cream to bilateral buttock wounds  frequent rounding to ensure skin is clean and dry  turn and position  nutritional support

## 2021-11-04 NOTE — DISCHARGE NOTE PROVIDER - NSDCCPCAREPLAN_GEN_ALL_CORE_FT
PRINCIPAL DISCHARGE DIAGNOSIS  Diagnosis: UTI (urinary tract infection)  Assessment and Plan of Treatment: you were admitted to the hospital with a possible UTI, you received a course of antibiotics, you are clear from infection. cleared for discharge.      SECONDARY DISCHARGE DIAGNOSES  Diagnosis: Adult failure to thrive  Assessment and Plan of Treatment: physical therapy saw you, you are cleared to go to rehab prior to going home with aides.

## 2021-11-04 NOTE — DISCHARGE NOTE PROVIDER - HOSPITAL COURSE
Hospital Course  76 year old female with hld, hypothyroid, peripheral neuropathy, anxiety, s/p recent admission to [facility] (10/27-10/30) secondary to UTI returns today due to worsening symptoms. Pt received three days of IV rocephin and was discharged on four more days of oral ceftin.  Returns today stating that her infection has not cleared up.  Reports foul odorous urine, also with associated suprapubic discomfort.  Reports feeling "delusional" at home, also with generalized weakness, incoherent and really "out of it." Pt reports waking up today and not knowing where she was, V[facility] services came to evaluate pt at home and recommended she come to ED for further evaluation and treatment.  Denies fever, sob, chest pain, recent sick contacts.     In the ED, T 98.9 Rectal, , /84, RR 17, SPo2 100% on RA.  Labs revealed WBC 17.25, K 5.8, positive UA, CTH unremarkable.  Pt received one dose meropenem 500 IVPB in ED and 1950 ml NS bolus.    Source of Infection:  Antibiotic / Last Day:    Palliative Care / Advanced Care Planning  Code Status:  Patient/Family agreeable to Hospice/Palliative (Y/N)?  Summary of Goals of Care Conversation:    Discharging Provider:  Olayinka Rodriguez NP  Contact Info: Cell [phone] - Please call with any questions or concerns.    Outpatient Provider:            Hospital Course  76 year old female with hld, hypothyroid, peripheral neuropathy, anxiety, s/p recent admission to [facility] (10/27-10/30) secondary to UTI returns today due to worsening symptoms. Pt received three days of IV rocephin and was discharged on four more days of oral ceftin.  Returns today stating that her infection has not cleared up.  Reports foul odorous urine, also with associated suprapubic discomfort.  Reports feeling "delusional" at home, also with generalized weakness, incoherent and really "out of it." Pt reports waking up today and not knowing where she was, VNS services came to evaluate pt at home and recommended she come to ED for further evaluation and treatment.  Denies fever, sob, chest pain, recent sick contacts.     In the ED, T 98.9 Rectal, , /84, RR 17, SPo2 100% on RA.  Labs revealed WBC 17.25, K 5.8, positive UA, CTH unremarkable.  Pt received one dose meropenem 500 IVPB in ED and 1950 ml NS bolus.    Palliative Care / Advanced Care Planning  Code Status: Full Code  Patient/Family agreeable to Hospice/Palliative (Y/N)?  Summary of Goals of Care Conversation:    Discharging Provider:  Olayinka Rodriguez NP  Contact Info: Cell [phone] - Please call with any questions or concerns.    Outpatient Provider: Dr. Reyes - notified

## 2021-11-04 NOTE — PROGRESS NOTE ADULT - SUBJECTIVE AND OBJECTIVE BOX
Patient is a 76y old  Female who presents with a chief complaint of UTI (2021 12:02)    Patient seen and examined at bedside.  no events overnight    ALLERGIES:  epinephrine (Unknown)  norepinephrine (Unknown)        Vital Signs Last 24 Hrs  T(F): 98.6 (2021 05:31), Max: 98.6 (2021 05:31)  HR: 71 (2021 05:31) (71 - 81)  BP: 124/71 (2021 05:31) (124/71 - 143/87)  RR: 17 (2021 05:31) (17 - 18)  SpO2: 96% (2021 05:31) (96% - 98%)  I&O's Summary    2021 07:01  -  2021 07:00  --------------------------------------------------------  IN: 1155 mL / OUT: 1000 mL / NET: 155 mL      MEDICATIONS:  acetaminophen     Tablet .. 650 milliGRAM(s) Oral every 6 hours PRN  ALPRAZolam 0.5 milliGRAM(s) Oral daily PRN  DULoxetine 20 milliGRAM(s) Oral daily  enoxaparin Injectable 40 milliGRAM(s) SubCutaneous daily  gabapentin 100 milliGRAM(s) Oral three times a day  influenza  Vaccine (HIGH DOSE) 0.7 milliLiter(s) IntraMuscular once  levothyroxine 75 MICROGram(s) Oral daily  multivitamin 1 Tablet(s) Oral daily  nicotine -  14 mG/24Hr(s) Patch 1 patch Transdermal daily  oxycodone    5 mG/acetaminophen 325 mG 1 Tablet(s) Oral every 12 hours PRN  potassium chloride   Solution 40 milliEquivalent(s) Oral once  simvastatin 10 milliGRAM(s) Oral at bedtime  sodium chloride 0.9%. 1000 milliLiter(s) IV Continuous <Continuous>      PHYSICAL EXAM:  General: NAD, A/O x 3  ENT: MMM, no thrush  Neck: Supple, No JVD  Lungs: Clear to auscultation bilaterally, non labored  Cardio: S1S2 regular  Abdomen: Soft, Nontender  Extremities: No cyanosis, No edema    LABS:                        11.6   8.74  )-----------( 259      ( 2021 06:20 )             37.4         144  |  110  |  9   ----------------------------<  86  3.4   |  24  |  1.02    Ca    8.9      2021 06:20    TPro  8.1  /  Alb  3.5  /  TBili  0.4  /  DBili  x   /  AST  53  /  ALT  27  /  AlkPhos  78      eGFR if Non African American: 54 mL/min/1.73M2 (21 @ 06:20)  eGFR if : 63 mL/min/1.73M2 (21 @ 06:20)      Lactate, Blood: 1.4 mmol/L ( @ 06:21)  Lactate, Blood: 1.6 mmol/L ( @ 20:20)  Lactate, Blood: 1.3 mmol/L ( @ 17:50)  Lactate, Blood: 2.3 mmol/L ( @ 14:35)                          Urinalysis Basic - ( 2021 16:15 )    Color: Yellow / Appearance: Clear / S.005 / pH: x  Gluc: x / Ketone: Small  / Bili: Negative / Urobili: Negative   Blood: x / Protein: 30 mg/dL / Nitrite: Negative   Leuk Esterase: Moderate / RBC: 26-50 /HPF / WBC >50 /HPF   Sq Epi: x / Non Sq Epi: Neg.-Few / Bacteria: Many /HPF        Culture - Urine (collected 2021 16:15)  Source: Clean Catch Clean Catch (Midstream)  Final Report (2021 18:06):    <10,000 CFU/mL Normal Urogenital Nayeli    Culture - Blood (collected 2021 14:35)  Source: .Blood Blood-Peripheral  Preliminary Report (2021 19:02):    No growth to date.    Culture - Blood (collected 2021 14:35)  Source: .Blood Blood-Peripheral  Preliminary Report (2021 19:02):    No growth to date.      COVID-19 PCR: NotDetec (10-27-21 @ 14:00)      RADIOLOGY & ADDITIONAL TESTS:    Care Discussed with Consultants/Other Providers:

## 2021-11-06 LAB
CULTURE RESULTS: SIGNIFICANT CHANGE UP
CULTURE RESULTS: SIGNIFICANT CHANGE UP
SPECIMEN SOURCE: SIGNIFICANT CHANGE UP
SPECIMEN SOURCE: SIGNIFICANT CHANGE UP

## 2021-11-08 ENCOUNTER — NON-APPOINTMENT (OUTPATIENT)
Age: 76
End: 2021-11-08

## 2021-11-09 ENCOUNTER — APPOINTMENT (OUTPATIENT)
Dept: INTERNAL MEDICINE | Facility: CLINIC | Age: 76
End: 2021-11-09

## 2021-12-03 ENCOUNTER — INPATIENT (INPATIENT)
Facility: HOSPITAL | Age: 76
LOS: 3 days | Discharge: HOME | DRG: 896 | End: 2021-12-07
Attending: INTERNAL MEDICINE | Admitting: STUDENT IN AN ORGANIZED HEALTH CARE EDUCATION/TRAINING PROGRAM
Payer: MEDICARE

## 2021-12-03 VITALS
RESPIRATION RATE: 20 BRPM | OXYGEN SATURATION: 99 % | SYSTOLIC BLOOD PRESSURE: 130 MMHG | HEART RATE: 92 BPM | TEMPERATURE: 98 F | WEIGHT: 154.98 LBS | DIASTOLIC BLOOD PRESSURE: 84 MMHG | HEIGHT: 60 IN

## 2021-12-03 LAB
ALBUMIN SERPL ELPH-MCNC: 3.7 G/DL — SIGNIFICANT CHANGE UP (ref 3.3–5)
ALP SERPL-CCNC: 72 U/L — SIGNIFICANT CHANGE UP (ref 40–120)
ALT FLD-CCNC: 23 U/L — SIGNIFICANT CHANGE UP (ref 10–45)
ANION GAP SERPL CALC-SCNC: 13 MMOL/L — SIGNIFICANT CHANGE UP (ref 5–17)
APAP SERPL-MCNC: 1 UG/ML — LOW (ref 10–30)
AST SERPL-CCNC: 19 U/L — SIGNIFICANT CHANGE UP (ref 10–40)
BASOPHILS # BLD AUTO: 0.04 K/UL — SIGNIFICANT CHANGE UP (ref 0–0.2)
BASOPHILS NFR BLD AUTO: 0.5 % — SIGNIFICANT CHANGE UP (ref 0–2)
BILIRUB SERPL-MCNC: 0.2 MG/DL — SIGNIFICANT CHANGE UP (ref 0.2–1.2)
BUN SERPL-MCNC: 16 MG/DL — SIGNIFICANT CHANGE UP (ref 7–23)
CALCIUM SERPL-MCNC: 10 MG/DL — SIGNIFICANT CHANGE UP (ref 8.4–10.5)
CHLORIDE SERPL-SCNC: 106 MMOL/L — SIGNIFICANT CHANGE UP (ref 96–108)
CO2 SERPL-SCNC: 27 MMOL/L — SIGNIFICANT CHANGE UP (ref 22–31)
CREAT SERPL-MCNC: 1.23 MG/DL — SIGNIFICANT CHANGE UP (ref 0.5–1.3)
EOSINOPHIL # BLD AUTO: 0.18 K/UL — SIGNIFICANT CHANGE UP (ref 0–0.5)
EOSINOPHIL NFR BLD AUTO: 2.3 % — SIGNIFICANT CHANGE UP (ref 0–6)
ETHANOL SERPL-MCNC: <3 MG/DL — SIGNIFICANT CHANGE UP (ref 0–3)
GLUCOSE SERPL-MCNC: 95 MG/DL — SIGNIFICANT CHANGE UP (ref 70–99)
HCT VFR BLD CALC: 44.4 % — SIGNIFICANT CHANGE UP (ref 34.5–45)
HGB BLD-MCNC: 14.1 G/DL — SIGNIFICANT CHANGE UP (ref 11.5–15.5)
IMM GRANULOCYTES NFR BLD AUTO: 0.3 % — SIGNIFICANT CHANGE UP (ref 0–1.5)
LYMPHOCYTES # BLD AUTO: 3.55 K/UL — HIGH (ref 1–3.3)
LYMPHOCYTES # BLD AUTO: 44.7 % — HIGH (ref 13–44)
MCHC RBC-ENTMCNC: 28.5 PG — SIGNIFICANT CHANGE UP (ref 27–34)
MCHC RBC-ENTMCNC: 31.8 GM/DL — LOW (ref 32–36)
MCV RBC AUTO: 89.7 FL — SIGNIFICANT CHANGE UP (ref 80–100)
MONOCYTES # BLD AUTO: 0.6 K/UL — SIGNIFICANT CHANGE UP (ref 0–0.9)
MONOCYTES NFR BLD AUTO: 7.6 % — SIGNIFICANT CHANGE UP (ref 2–14)
NEUTROPHILS # BLD AUTO: 3.55 K/UL — SIGNIFICANT CHANGE UP (ref 1.8–7.4)
NEUTROPHILS NFR BLD AUTO: 44.6 % — SIGNIFICANT CHANGE UP (ref 43–77)
NRBC # BLD: 0 /100 WBCS — SIGNIFICANT CHANGE UP (ref 0–0)
PLATELET # BLD AUTO: 184 K/UL — SIGNIFICANT CHANGE UP (ref 150–400)
POTASSIUM SERPL-MCNC: 4.2 MMOL/L — SIGNIFICANT CHANGE UP (ref 3.5–5.3)
POTASSIUM SERPL-SCNC: 4.2 MMOL/L — SIGNIFICANT CHANGE UP (ref 3.5–5.3)
PROT SERPL-MCNC: 8.1 G/DL — SIGNIFICANT CHANGE UP (ref 6–8.3)
RBC # BLD: 4.95 M/UL — SIGNIFICANT CHANGE UP (ref 3.8–5.2)
RBC # FLD: 13.6 % — SIGNIFICANT CHANGE UP (ref 10.3–14.5)
SALICYLATES SERPL-MCNC: 2.7 MG/DL — LOW (ref 3–30)
SODIUM SERPL-SCNC: 146 MMOL/L — HIGH (ref 135–145)
WBC # BLD: 7.94 K/UL — SIGNIFICANT CHANGE UP (ref 3.8–10.5)
WBC # FLD AUTO: 7.94 K/UL — SIGNIFICANT CHANGE UP (ref 3.8–10.5)

## 2021-12-03 PROCEDURE — 99285 EMERGENCY DEPT VISIT HI MDM: CPT

## 2021-12-03 RX ORDER — SIMVASTATIN 20 MG/1
1 TABLET, FILM COATED ORAL
Qty: 0 | Refills: 0 | DISCHARGE

## 2021-12-03 RX ORDER — GABAPENTIN 400 MG/1
1 CAPSULE ORAL
Qty: 0 | Refills: 0 | DISCHARGE

## 2021-12-03 RX ORDER — DULOXETINE HYDROCHLORIDE 30 MG/1
1 CAPSULE, DELAYED RELEASE ORAL
Qty: 0 | Refills: 0 | DISCHARGE

## 2021-12-03 RX ORDER — ERGOCALCIFEROL 1.25 MG/1
1 CAPSULE ORAL
Qty: 0 | Refills: 0 | DISCHARGE

## 2021-12-03 RX ORDER — ALPRAZOLAM 0.25 MG
1 TABLET ORAL
Qty: 0 | Refills: 0 | DISCHARGE

## 2021-12-03 NOTE — ED ADULT NURSE NOTE - OBJECTIVE STATEMENT
pt biba for psych eval  ems states pt was throwing plates at her  ems states that pt is bed bound at home due to arthritis pt has psych history. pt states she was not throwing any thing pt affect flat she can recall only simple questions
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
## 2021-12-03 NOTE — ED PROVIDER NOTE - PHYSICAL EXAMINATION
General:     NAD, well-nourished, well-appearing  Head:     NC/AT, EOMI, oral mucosa moist  Neck:     supple  Lungs:     CTA b/l, no w/r/r  CVS:     S1S2, RRR, no m/g/r  Abd:     +BS, s/nt/nd, no organomegaly  Ext:    2+ radial and pedal pulses, no c/c/e  Neuro: grossly intact  psych : calm , cooperative ,

## 2021-12-03 NOTE — ED BEHAVIORAL HEALTH ASSESSMENT NOTE - AXIS III
Rheumatoid arthritis, Diverticulitis Diverticulitis, HTN, HLD, Vitamin D deficiency, Hypothyroidism and Rheumatoid arthritis (c/b Chronic back pain on Oxycodone)

## 2021-12-03 NOTE — ED BEHAVIORAL HEALTH ASSESSMENT NOTE - OTHER
Fair with limitations at baseline [facility], [facility], [facility], [facility] ED Visits, [facility] Outpatient , [facility], [facility], [facility], [facility], [facility], [facility], webcrims, [email or web address], google search, [facility], [place], [facility] ED, [facility], Pscelso not observd "tired" some depressive symptomatology and passive intermittent suicidality without plan or intent

## 2021-12-03 NOTE — ED BEHAVIORAL HEALTH ASSESSMENT NOTE - DIFFERENTIAL
Sedative related delirium, mild, hypoactive  rule out other organic sources (i.e UTI)  Chronic MDD. Sedative related delirium, mild, hypoactive  rule out other organic sources (i.e UTI)  r/o sedative/opioid use disorders  Chronic MDD.  Chronic PTSD  Chronic NAILA

## 2021-12-03 NOTE — ED BEHAVIORAL HEALTH ASSESSMENT NOTE - LEGAL HISTORY
Denies, although police have been called to her house multiple times. As per chart review; Denies, although police have been called to her house multiple times.
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
## 2021-12-03 NOTE — ED BEHAVIORAL HEALTH ASSESSMENT NOTE - DESCRIPTION
ED course and collateral from patient's  are as per BTCM (ED Behavioral health) note Diverticulitis, Rheumatoid arthritis Patient lives in [place] with her  and adult daughter with Autism.  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 As per chart review; Patient lives in [place] with her  and adult daughter with Autism.  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 as per HPI

## 2021-12-03 NOTE — ED BEHAVIORAL HEALTH ASSESSMENT NOTE - DESCRIPTION (FIRST USE, LAST USE, QUANTITY, FREQUENCY, DURATION)
Hx of IPPD cigarette use; current use unknown; outpatient med list as of April 2021 notable for nicotine replacement
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
## 2021-12-03 NOTE — ED PROVIDER NOTE - NSFOLLOWUPINSTRUCTIONS_ED_ALL_ED_FT
continue your medication regularly.   follow up with your primary care provider   return to ED if any problem or concern,
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
## 2021-12-03 NOTE — ED BEHAVIORAL HEALTH ASSESSMENT NOTE - VIOLENCE PROTECTIVE FACTORS:
Secondary Intention Text (Leave Blank If You Do Not Want): The defect will heal with secondary intention. Residential stability/Engagement in treatment
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
## 2021-12-04 DIAGNOSIS — F40.01 AGORAPHOBIA WITH PANIC DISORDER: ICD-10-CM

## 2021-12-04 DIAGNOSIS — N39.0 URINARY TRACT INFECTION, SITE NOT SPECIFIED: ICD-10-CM

## 2021-12-04 LAB
AMPHET UR-MCNC: NEGATIVE — SIGNIFICANT CHANGE UP
APPEARANCE UR: ABNORMAL
B PERT DNA SPEC QL NAA+PROBE: SIGNIFICANT CHANGE UP
BACTERIA # UR AUTO: ABNORMAL /HPF
BARBITURATES UR SCN-MCNC: NEGATIVE — SIGNIFICANT CHANGE UP
BENZODIAZ UR-MCNC: POSITIVE
BILIRUB UR-MCNC: NEGATIVE — SIGNIFICANT CHANGE UP
C PNEUM DNA SPEC QL NAA+PROBE: SIGNIFICANT CHANGE UP
COCAINE METAB.OTHER UR-MCNC: NEGATIVE — SIGNIFICANT CHANGE UP
COLOR SPEC: YELLOW — SIGNIFICANT CHANGE UP
COMMENT - URINE: SIGNIFICANT CHANGE UP
DIFF PNL FLD: ABNORMAL
EPI CELLS # UR: SIGNIFICANT CHANGE UP
FLUAV H1 2009 PAND RNA SPEC QL NAA+PROBE: SIGNIFICANT CHANGE UP
FLUAV H1 RNA SPEC QL NAA+PROBE: SIGNIFICANT CHANGE UP
FLUAV H3 RNA SPEC QL NAA+PROBE: SIGNIFICANT CHANGE UP
FLUAV SUBTYP SPEC NAA+PROBE: SIGNIFICANT CHANGE UP
FLUBV RNA SPEC QL NAA+PROBE: SIGNIFICANT CHANGE UP
GLUCOSE UR QL: NEGATIVE — SIGNIFICANT CHANGE UP
HADV DNA SPEC QL NAA+PROBE: SIGNIFICANT CHANGE UP
HCOV PNL SPEC NAA+PROBE: SIGNIFICANT CHANGE UP
HMPV RNA SPEC QL NAA+PROBE: SIGNIFICANT CHANGE UP
HPIV1 RNA SPEC QL NAA+PROBE: SIGNIFICANT CHANGE UP
HPIV2 RNA SPEC QL NAA+PROBE: SIGNIFICANT CHANGE UP
HPIV3 RNA SPEC QL NAA+PROBE: SIGNIFICANT CHANGE UP
HPIV4 RNA SPEC QL NAA+PROBE: SIGNIFICANT CHANGE UP
KETONES UR-MCNC: NEGATIVE — SIGNIFICANT CHANGE UP
LEUKOCYTE ESTERASE UR-ACNC: ABNORMAL
METHADONE UR-MCNC: NEGATIVE — SIGNIFICANT CHANGE UP
NITRITE UR-MCNC: POSITIVE
OPIATES UR-MCNC: NEGATIVE — SIGNIFICANT CHANGE UP
PCP SPEC-MCNC: SIGNIFICANT CHANGE UP
PCP UR-MCNC: NEGATIVE — SIGNIFICANT CHANGE UP
PH UR: 6 — SIGNIFICANT CHANGE UP (ref 5–8)
PROT UR-MCNC: 100
RAPID RVP RESULT: SIGNIFICANT CHANGE UP
RBC CASTS # UR COMP ASSIST: SIGNIFICANT CHANGE UP /HPF (ref 0–4)
RSV RNA SPEC QL NAA+PROBE: SIGNIFICANT CHANGE UP
RV+EV RNA SPEC QL NAA+PROBE: SIGNIFICANT CHANGE UP
SARS-COV-2 RNA SPEC QL NAA+PROBE: SIGNIFICANT CHANGE UP
SP GR SPEC: 1.02 — SIGNIFICANT CHANGE UP (ref 1.01–1.02)
THC UR QL: NEGATIVE — SIGNIFICANT CHANGE UP
UROBILINOGEN FLD QL: NEGATIVE — SIGNIFICANT CHANGE UP
WBC UR QL: >50 /HPF (ref 0–5)

## 2021-12-04 PROCEDURE — 71045 X-RAY EXAM CHEST 1 VIEW: CPT | Mod: 26

## 2021-12-04 PROCEDURE — 99233 SBSQ HOSP IP/OBS HIGH 50: CPT

## 2021-12-04 PROCEDURE — 99223 1ST HOSP IP/OBS HIGH 75: CPT

## 2021-12-04 PROCEDURE — 93010 ELECTROCARDIOGRAM REPORT: CPT

## 2021-12-04 RX ORDER — PIPERACILLIN AND TAZOBACTAM 4; .5 G/20ML; G/20ML
3.38 INJECTION, POWDER, LYOPHILIZED, FOR SOLUTION INTRAVENOUS ONCE
Refills: 0 | Status: COMPLETED | OUTPATIENT
Start: 2021-12-04 | End: 2021-12-04

## 2021-12-04 RX ORDER — GABAPENTIN 400 MG/1
100 CAPSULE ORAL THREE TIMES A DAY
Refills: 0 | Status: DISCONTINUED | OUTPATIENT
Start: 2021-12-04 | End: 2021-12-07

## 2021-12-04 RX ORDER — GABAPENTIN 400 MG/1
100 CAPSULE ORAL THREE TIMES A DAY
Refills: 0 | Status: DISCONTINUED | OUTPATIENT
Start: 2021-12-04 | End: 2021-12-04

## 2021-12-04 RX ORDER — SENNA PLUS 8.6 MG/1
2 TABLET ORAL AT BEDTIME
Refills: 0 | Status: DISCONTINUED | OUTPATIENT
Start: 2021-12-04 | End: 2021-12-07

## 2021-12-04 RX ORDER — SIMVASTATIN 20 MG/1
10 TABLET, FILM COATED ORAL AT BEDTIME
Refills: 0 | Status: DISCONTINUED | OUTPATIENT
Start: 2021-12-04 | End: 2021-12-07

## 2021-12-04 RX ORDER — LANOLIN ALCOHOL/MO/W.PET/CERES
6 CREAM (GRAM) TOPICAL AT BEDTIME
Refills: 0 | Status: DISCONTINUED | OUTPATIENT
Start: 2021-12-04 | End: 2021-12-07

## 2021-12-04 RX ORDER — OXYCODONE HYDROCHLORIDE 5 MG/1
10 TABLET ORAL EVERY 6 HOURS
Refills: 0 | Status: DISCONTINUED | OUTPATIENT
Start: 2021-12-04 | End: 2021-12-07

## 2021-12-04 RX ORDER — INFLUENZA VIRUS VACCINE 15; 15; 15; 15 UG/.5ML; UG/.5ML; UG/.5ML; UG/.5ML
0.7 SUSPENSION INTRAMUSCULAR ONCE
Refills: 0 | Status: DISCONTINUED | OUTPATIENT
Start: 2021-12-04 | End: 2021-12-07

## 2021-12-04 RX ORDER — CEFTRIAXONE 500 MG/1
1000 INJECTION, POWDER, FOR SOLUTION INTRAMUSCULAR; INTRAVENOUS ONCE
Refills: 0 | Status: DISCONTINUED | OUTPATIENT
Start: 2021-12-04 | End: 2021-12-04

## 2021-12-04 RX ORDER — NICOTINE POLACRILEX 2 MG
1 GUM BUCCAL DAILY
Refills: 0 | Status: DISCONTINUED | OUTPATIENT
Start: 2021-12-04 | End: 2021-12-07

## 2021-12-04 RX ORDER — LEVOTHYROXINE SODIUM 125 MCG
75 TABLET ORAL DAILY
Refills: 0 | Status: DISCONTINUED | OUTPATIENT
Start: 2021-12-04 | End: 2021-12-07

## 2021-12-04 RX ORDER — POLYETHYLENE GLYCOL 3350 17 G/17G
17 POWDER, FOR SOLUTION ORAL DAILY
Refills: 0 | Status: DISCONTINUED | OUTPATIENT
Start: 2021-12-04 | End: 2021-12-07

## 2021-12-04 RX ORDER — OXYCODONE HYDROCHLORIDE 5 MG/1
5 TABLET ORAL EVERY 4 HOURS
Refills: 0 | Status: DISCONTINUED | OUTPATIENT
Start: 2021-12-04 | End: 2021-12-07

## 2021-12-04 RX ORDER — ENOXAPARIN SODIUM 100 MG/ML
40 INJECTION SUBCUTANEOUS DAILY
Refills: 0 | Status: DISCONTINUED | OUTPATIENT
Start: 2021-12-04 | End: 2021-12-07

## 2021-12-04 RX ORDER — DULOXETINE HYDROCHLORIDE 30 MG/1
20 CAPSULE, DELAYED RELEASE ORAL
Refills: 0 | Status: DISCONTINUED | OUTPATIENT
Start: 2021-12-04 | End: 2021-12-07

## 2021-12-04 RX ORDER — ACETAMINOPHEN 500 MG
975 TABLET ORAL EVERY 8 HOURS
Refills: 0 | Status: DISCONTINUED | OUTPATIENT
Start: 2021-12-04 | End: 2021-12-07

## 2021-12-04 RX ORDER — ALPRAZOLAM 0.25 MG
0.5 TABLET ORAL
Refills: 0 | Status: DISCONTINUED | OUTPATIENT
Start: 2021-12-04 | End: 2021-12-07

## 2021-12-04 RX ORDER — PIPERACILLIN AND TAZOBACTAM 4; .5 G/20ML; G/20ML
3.38 INJECTION, POWDER, LYOPHILIZED, FOR SOLUTION INTRAVENOUS EVERY 8 HOURS
Refills: 0 | Status: DISCONTINUED | OUTPATIENT
Start: 2021-12-04 | End: 2021-12-07

## 2021-12-04 RX ADMIN — Medication 975 MILLIGRAM(S): at 13:50

## 2021-12-04 RX ADMIN — Medication 6 MILLIGRAM(S): at 21:43

## 2021-12-04 RX ADMIN — GABAPENTIN 100 MILLIGRAM(S): 400 CAPSULE ORAL at 13:50

## 2021-12-04 RX ADMIN — DULOXETINE HYDROCHLORIDE 20 MILLIGRAM(S): 30 CAPSULE, DELAYED RELEASE ORAL at 17:43

## 2021-12-04 RX ADMIN — GABAPENTIN 100 MILLIGRAM(S): 400 CAPSULE ORAL at 21:43

## 2021-12-04 RX ADMIN — Medication 975 MILLIGRAM(S): at 15:08

## 2021-12-04 RX ADMIN — PIPERACILLIN AND TAZOBACTAM 25 GRAM(S): 4; .5 INJECTION, POWDER, LYOPHILIZED, FOR SOLUTION INTRAVENOUS at 17:44

## 2021-12-04 RX ADMIN — Medication 975 MILLIGRAM(S): at 22:13

## 2021-12-04 RX ADMIN — SIMVASTATIN 10 MILLIGRAM(S): 20 TABLET, FILM COATED ORAL at 21:44

## 2021-12-04 RX ADMIN — OXYCODONE HYDROCHLORIDE 10 MILLIGRAM(S): 5 TABLET ORAL at 20:04

## 2021-12-04 RX ADMIN — POLYETHYLENE GLYCOL 3350 17 GRAM(S): 17 POWDER, FOR SOLUTION ORAL at 13:50

## 2021-12-04 RX ADMIN — ENOXAPARIN SODIUM 40 MILLIGRAM(S): 100 INJECTION SUBCUTANEOUS at 11:35

## 2021-12-04 RX ADMIN — Medication 0.5 MILLIGRAM(S): at 21:47

## 2021-12-04 RX ADMIN — Medication 975 MILLIGRAM(S): at 21:43

## 2021-12-04 RX ADMIN — OXYCODONE HYDROCHLORIDE 10 MILLIGRAM(S): 5 TABLET ORAL at 20:34

## 2021-12-04 RX ADMIN — PIPERACILLIN AND TAZOBACTAM 200 GRAM(S): 4; .5 INJECTION, POWDER, LYOPHILIZED, FOR SOLUTION INTRAVENOUS at 10:47
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
## 2021-12-04 NOTE — CHART NOTE - NSCHARTNOTEFT_GEN_A_CORE
SW consulted to address patient's husbands concerns around behavior, placement. SW met briefly with patient who was confused, unable to recall events leading to ED. SW contacted patient's , Dylan ([phone]) who reports that he wants patient to be admitted to psych unit.  provided history of patient getting angry at home, frequent EMS calls. SW confirmed that patient receives 7 days/12 hr home health aides, also gets home PT through [place], home NP visits through [facility] ([phone]).     Per telepsych, patient does not meet criteria for involuntary admission. Patient is medically cleared for discharge. SW provided  with information for [facility] ([phone]) and  will call on Monday to inquire about services. Patient will also follow up with PCP Dr. John Reyes.  requested ambulance transportation home. SW arranged transportation home through [facility] for 12:30PM pickup. SW discussed above information with medical team. SW consulted to address patient's husbands concerns around behavior, placement. SW met briefly with patient who was confused, unable to recall events leading to ED. SW contacted patient's , Dylan ([phone]) who reports that he wants patient to be admitted to psych unit.  provided history of patient getting angry at home, frequent EMS calls. SW confirmed that patient receives 7 days/12 hr home health aides, also gets home PT through [facility], home NP visits through [facility] ([phone]).     Per telepsych, patient does not meet criteria for involuntary admission. Patient is medically cleared for discharge. SW provided  with information for [facility] ([phone]) and  will call on Monday to inquire about services. Patient will also follow up with PCP Dr. John Reyes.  requested ambulance transportation home. SW arranged transportation home through [facility] for 12:30PM pickup.     //      RENEE informed that patient has UTI and will be admitted to hospital. SW cancelled transportation request.

## 2021-12-04 NOTE — OCCUPATIONAL THERAPY INITIAL EVALUATION ADULT - PERTINENT HX OF CURRENT PROBLEM, REHAB EVAL
75 y/o F PMH HLD. hypothyroid, peripheral neuropathy, anxiety, bedbound due to arthritis, recurrent UTI with hospitalizations 10/27-10/30 and 11/1-11/4 brought in by  for combativeness, psych eval.

## 2021-12-04 NOTE — OCCUPATIONAL THERAPY INITIAL EVALUATION ADULT - GENERAL OBSERVATIONS, REHAB EVAL
MD orders received. Chart reviewed, contents noted, conferred with RN. Pt received lying supine in bed, +Left IV, 5/10 c/o pain in right knee with AROM. Pt left supine in bed, pleasant/calm, NAD, VSS.

## 2021-12-04 NOTE — OCCUPATIONAL THERAPY INITIAL EVALUATION ADULT - ADDITIONAL COMMENTS
As per pt, pt resides in  with  and daughter +7STE to enter +1HR. Pt resides on main level once inside with shower +curtain +grab bars +shower chair. Pt obtains DME including wheelchair, RW, grab bar, shower chair and has HHA 12hr/day 7x/week to assist with ADLs and functional transfers. As per pt, pt receives PT home care, as well.

## 2021-12-04 NOTE — H&P ADULT - HISTORY OF PRESENT ILLNESS
77 y/o F PMH HLD. hypothyroid, peripheral neuropathy, anxiety, bedbound due to arthritis, recurrent UTI with hospitalizations 10/27-10/30 and 11/1-11/4 brought in by  for combativeness, psych eval.     Per , patient is combative, throws utensils, phones, and is uncontrollable. Per patient,  fabricates the story and no longer wants to care for her and is neglectful.   Denies headache, fever, chills, cp, sob, n/v, abd pain.    ED course: /84, HR 92, RR 20, O2 99% on RA. Telepsych eval in ED - cleared patient for discharge. UA grossly positive.  75 y/o F PMH HLD. hypothyroid, peripheral neuropathy, anxiety, bedbound due to arthritis, recurrent UTI with hospitalizations 10/27-10/30 and 11/1-11/4 brought in by  for combativeness, psych eval.     Per , patient is combative, throws utensils, phones, and is uncontrollable. Per patient,  fabricates the story and no longer wants to care for her and is neglectful. Denies headache, fever, chills, cp, sob, n/v, abd pain. + occasional dysuria.     Patient states she does not feel safe going home. Unsure if she still has HHA. Unsure how long she sits in her diaper after BM/urination at home. states she only "eats/drinks only when they give me something". + depression. denies recent changes in supplements, medications, or substance abuse.    ED course: /84, HR 92, RR 20, O2 99% on RA. Telepsych eval in ED - cleared patient for discharge. UA grossly positive.

## 2021-12-04 NOTE — PATIENT PROFILE ADULT - FALL HARM RISK - RISK INTERVENTIONS

## 2021-12-04 NOTE — H&P ADULT - NSHPPHYSICALEXAM_GEN_ALL_CORE
T(C): 36.5 (12-04-21 @ 09:42), Max: 36.9 (12-03-21 @ 21:17)  HR: 70 (12-04-21 @ 09:42) (70 - 92)  BP: 130/79 (12-04-21 @ 09:42) (130/79 - 130/84)  RR: 18 (12-04-21 @ 09:42) (18 - 20)  SpO2: 98% (12-04-21 @ 09:42) (98% - 99%)  Wt(kg): --Vital Signs Last 24 Hrs  T(C): 36.5 (04 Dec 2021 09:42), Max: 36.9 (03 Dec 2021 21:17)  T(F): 97.7 (04 Dec 2021 09:42), Max: 98.4 (03 Dec 2021 21:17)  HR: 70 (04 Dec 2021 09:42) (70 - 92)  BP: 130/79 (04 Dec 2021 09:42) (130/79 - 130/84)  BP(mean): --  RR: 18 (04 Dec 2021 09:42) (18 - 20)  SpO2: 98% (04 Dec 2021 09:42) (98% - 99%)    PHYSICAL EXAM:  GENERAL: NAD, elderly  HEAD:  Atraumatic, Normocephalic  EYES: EOMI, PERRLA, conjunctiva and sclera clear  ENMT: No tonsillar erythema, exudates, or enlargement; Moist mucous membranes, Good dentition, No lesions  NECK: Supple, No JVD,  NERVOUS SYSTEM: Alert & Oriented X3, Good concentration  CHEST/LUNG: Clear to percussion bilaterally; No rales, rhonchi, wheezing, or rubs  HEART: Regular rate and rhythm; No murmurs, rubs, or gallops  ABDOMEN: Soft, Nontender, Nondistended; Bowel sounds present  EXTREMITIES:  2+ Peripheral Pulses, No clubbing, cyanosis, or edema  SKIN: No rashes or lesions T(C): 36.5 (12-04-21 @ 09:42), Max: 36.9 (12-03-21 @ 21:17)  HR: 70 (12-04-21 @ 09:42) (70 - 92)  BP: 130/79 (12-04-21 @ 09:42) (130/79 - 130/84)  RR: 18 (12-04-21 @ 09:42) (18 - 20)  SpO2: 98% (12-04-21 @ 09:42) (98% - 99%)  Wt(kg): --Vital Signs Last 24 Hrs  T(C): 36.5 (04 Dec 2021 09:42), Max: 36.9 (03 Dec 2021 21:17)  T(F): 97.7 (04 Dec 2021 09:42), Max: 98.4 (03 Dec 2021 21:17)  HR: 70 (04 Dec 2021 09:42) (70 - 92)  BP: 130/79 (04 Dec 2021 09:42) (130/79 - 130/84)  BP(mean): --  RR: 18 (04 Dec 2021 09:42) (18 - 20)  SpO2: 98% (04 Dec 2021 09:42) (98% - 99%)    PHYSICAL EXAM:  GENERAL: NAD, elderly, flat affect  HEAD:  Atraumatic, Normocephalic  EYES: EOMI, PERRLA, conjunctiva and sclera clear  ENMT: No tonsillar erythema, exudates, or enlargement; Moist mucous membranes  NECK: Supple, No JVD  NERVOUS SYSTEM: Alert & Oriented X3, fair concentration  CHEST/LUNG: Clear to percussion bilaterally; No rales, rhonchi, wheezing, or rubs  HEART: Regular rate and rhythm; No murmurs, rubs, or gallops  ABDOMEN: Soft, Nontender, Nondistended; Bowel sounds present  EXTREMITIES:  2+ Peripheral Pulses, No clubbing, cyanosis, or edema  SKIN: warm, dry

## 2021-12-04 NOTE — H&P ADULT - NSHPREVIEWOFSYSTEMS_GEN_ALL_CORE
REVIEW OF SYSTEMS:  CONSTITUTIONAL: No fever, weight loss, or fatigue  EYES: No eye pain, visual disturbances, or discharge  ENMT:  No difficulty hearing, tinnitus, vertigo; No sinus or throat pain  NECK: No pain or stiffness  BREASTS: No pain, masses, or nipple discharge  RESPIRATORY: No cough, wheezing, chills or hemoptysis; No shortness of breath  CARDIOVASCULAR: No chest pain, palpitations, dizziness, or leg swelling  GASTROINTESTINAL: No abdominal or epigastric pain. No nausea, vomiting, or hematemesis; No diarrhea or constipation. No melena or hematochezia.  GENITOURINARY: No dysuria, frequency, hematuria, or incontinence  NEUROLOGICAL: No headaches, memory loss, loss of strength, numbness, or tremors  SKIN: No itching, burning, rashes, or lesions   LYMPH NODES: No enlarged glands  ENDOCRINE: No heat or cold intolerance; No hair loss  MUSCULOSKELETAL: No joint pain or swelling; No muscle, back, or extremity pain  PSYCHIATRIC: +depression, some anxiety  HEME/LYMPH: No easy bruising, or bleeding gums  ALLERGY AND IMMUNOLOGIC: No hives or eczema    ALL ROS REVIEWED AND NORMAL EXCEPT AS STATED ABOVE REVIEW OF SYSTEMS:  CONSTITUTIONAL: + depression  EYES: No eye pain, visual disturbances, or discharge  ENMT:  No difficulty hearing, tinnitus, vertigo; No sinus or throat pain  NECK: No pain or stiffness  BREASTS: No pain, masses, or nipple discharge  RESPIRATORY: No cough, wheezing, chills or hemoptysis; No shortness of breath  CARDIOVASCULAR: No chest pain, palpitations, dizziness, or leg swelling  GASTROINTESTINAL: No abdominal or epigastric pain. No nausea, vomiting, or hematemesis; No diarrhea or constipation. No melena or hematochezia  GENITOURINARY: No dysuria, frequency, hematuria, or incontinence  NEUROLOGICAL: No headaches, memory loss, loss of strength, numbness, or tremors  SKIN: No itching, burning, rashes, or lesions   LYMPH NODES: No enlarged glands  ENDOCRINE: No heat or cold intolerance; No hair loss  MUSCULOSKELETAL: No joint pain or swelling; No muscle, back, or extremity pain  PSYCHIATRIC: +depression, some anxiety  HEME/LYMPH: No easy bruising, or bleeding gums  ALLERGY AND IMMUNOLOGIC: No hives or eczema    ALL ROS REVIEWED AND NORMAL EXCEPT AS STATED ABOVE

## 2021-12-04 NOTE — PATIENT PROFILE ADULT - DO YOU NEED ADDITIONAL SERVICES TO MANAGE ANY OF THESE MEDICAL CONDITIONS AT HOME?
Notified Ms. Mccabe of urine culture results. She states urinary symptoms have improved as has the right labial lesions, however, she is getting a lesion on the left, advised to follow up with Dr. Rivera for further evaluation as it may need to be drained, cultured and possibly antibiotics adjusted, voiced understanding.   
yes

## 2021-12-04 NOTE — H&P ADULT - NSHPSOCIALHISTORY_GEN_ALL_CORE
Lives at home with  and daughter  Pt uses a wheelchair and walker at baseline, able to perform some ADLs on her home.  Denies EtOH. +tobacco use about 1/2-1 ppd, denies illicit drug use

## 2021-12-04 NOTE — H&P ADULT - ASSESSMENT
75 y/o F PMH HLD. hypothyroid, peripheral neuropathy, anxiety, bedbound due to arthritis, recurrent UTI with hospitalizations 10/27-10/30 and 11/1-11/4 brought in by  for combativeness, psych eval, found with UTI.     Acute Metabolic Encephalopathy due to UTI   -Admit to Medicine   -UA grossly positive, follow urine culture  -Recently treated with CFTX. Switch to zosyn    Hypernatremia  -Na146, likely due to dehydration  -Encourage PO hydration     Anxiety, Depression, hx of PTSD  -Telepsych cleared patient psychiatrically for discharge home   -Psych consulted for follow up  -Concern for unsafe disposition to home - SW/CM follow up  -Continue home xanax and cymbalta    Stage 2 Sacral Decubiti  -Wound care per nursing  -Frequent repositioning  -Nutrition follow up    HLD - chronic  -Continue statin    Peripheral Neuropathy - chronic  -Continue neurontin    Rheumatoid Arthritis - chronic  -Continue oxycodone BID prn  -Bowel regimen     Hypothyroid - chronic  -Continue levothyroxine    Nicotine Addiction  -Encourage smoking cessation  -Will order nicotine patch daily    DVT ppx - lovenox  Vitals q8h  Diet: Regular  Activity: Bedrest   PT/OT as tolerated  GI ppx: No indication for GI prophylaxis  Standard precautions: Aspiration, fall, safety, seizure, skin  Code Status  HCP  77 y/o F PMH HLD. hypothyroid, peripheral neuropathy, anxiety, bedbound due to arthritis, recurrent UTI with hospitalizations 10/27-10/30 and 11/1-11/4 brought in by  for combativeness, psych eval, found with UTI.     Acute Metabolic Encephalopathy due to UTI   -Admit to Medicine   -UA grossly positive, follow urine culture  -Recently treated with CFTX. Switch to zosyn    Hypernatremia  -Na 146, likely due to dehydration  -Encourage PO hydration     Anxiety, Depression, hx of PTSD  -Telepsych cleared patient psychiatrically for discharge home   -Psych consulted for follow up and medication adjustment  -Concern for unsafe disposition to home - SW/CM follow up  -Continue home xanax and cymbalta    Stage 2 Sacral Decubiti  -Wound care per nursing  -Frequent repositioning  -Nutrition follow up    HLD - chronic  -Continue statin    Peripheral Neuropathy - chronic  -Continue neurontin    Rheumatoid Arthritis - chronic  -Continue oxycodone BID prn  -Bowel regimen     Hypothyroid - chronic  -Continue levothyroxine    Nicotine Addiction  -Nicotine patch daily    DVT ppx - lovenox  Vitals q8h  Diet: Regular  Activity: Bedrest   PT/OT as tolerated  GI ppx: No indication for GI prophylaxis  Standard precautions: Aspiration, fall, safety, seizure, skin  Code Status: DNR/DNI  HCP: Patient undecided at this time    Provider offered to call patient's  for routine update, patient declines at this time 77 y/o F PMH HLD. hypothyroid, peripheral neuropathy, anxiety, bedbound due to arthritis, recurrent UTI with hospitalizations 10/27-10/30 and 11/1-11/4 brought in by  for combativeness, psych eval, found with UTI.     Acute Metabolic Encephalopathy due to UTI   -Admit to Medicine   -UA grossly positive, follow urine culture  -Recently treated with CFTX. Switch to zosyn    Hypernatremia  -Na 146, likely due to dehydration  -Encourage PO hydration     Anxiety, Depression, hx of PTSD  -Telepsych cleared patient psychiatrically for discharge home   -Psych consulted for follow up and medication adjustment  -Concern for unsafe disposition to home - SW/CM follow up  -Continue home xanax and cymbalta    Stage 2 Sacral Decubiti  -Wound care per nursing  -Frequent repositioning  -Nutrition follow up    HLD - chronic  -Continue statin    Peripheral Neuropathy - chronic  -Continue neurontin    Rheumatoid Arthritis - chronic  -Tylenol, oxycodone prn  -Bowel regimen     Hypothyroid - chronic  -Continue levothyroxine    Nicotine Addiction  -Nicotine patch daily    DVT ppx - lovenox  Vitals q8h  Diet: Regular  Activity: Bedrest   PT/OT as tolerated  GI ppx: No indication for GI prophylaxis  Standard precautions: Aspiration, fall, safety, seizure, skin  Code Status: DNR/DNI  HCP: Patient undecided at this time    Provider offered to call patient's  for routine update, patient declines at this time 77 y/o F PMH HLD. hypothyroid, peripheral neuropathy, anxiety, bedbound due to arthritis, recurrent UTI with hospitalizations 10/27-10/30 and 11/1-11/4 brought in by  for combativeness, psych eval, found with UTI.     Acute Metabolic Encephalopathy due to UTI   -Admit to Medicine   -UA grossly positive, follow urine culture  -Recently treated with CFTX. Switch to zosyn    Hypernatremia  -Na 146, likely due to dehydration  -Encourage PO hydration     Anxiety, Depression, hx of PTSD  -Telepsych cleared patient psychiatrically for discharge home   -Psych consulted for follow up and medication adjustment  -Concern for unsafe disposition to home - SW/CM follow up  -Continue home xanax and cymbalta    Stage 2 Sacral Decubiti  -Wound care per nursing  -Frequent repositioning  -Nutrition follow up    HLD - chronic  -Continue statin    Peripheral Neuropathy - chronic  -Continue neurontin    Rheumatoid Arthritis - chronic  -Tylenol, oxycodone prn  -Bowel regimen     Hypothyroid - chronic  -Continue levothyroxine    Nicotine Addiction  -Nicotine patch daily    DVT ppx - lovenox  Vitals q8h  Diet: Regular  Activity: Bedrest   PT/OT as tolerated  GI ppx: No indication for GI prophylaxis  Standard precautions: Aspiration, fall, safety, seizure, skin  Code Status: DNR/DNI  HCP: Patient undecided at this time    Provider offered to call patient's  for routine update, patient declines at this time    IMPROVE VTE Individual Risk Assessment    RISK                                                                Points    [  ] Previous VTE                                                  3    [  ] Thrombophilia                                               2    [  ] Lower limb paralysis                                      2        (unable to hold up >15 seconds)      [  ] Current Cancer                                              2         (within 6 months)    [ x ] Immobilization > 24 hrs                                1    [  ] ICU/CCU stay > 24 hours                              1    [x  ] Age > 60                                                      1    IMPROVE VTE Score ____2_____    IMPROVE Score 0-1: Low Risk, No VTE prophylaxis required for most patients, encourage ambulation.   IMPROVE Score 2-3: At risk, pharmacologic VTE prophylaxis is indicated for most patients (in the absence of a contraindication)  IMPROVE Score > or = 4: High Risk, pharmacologic VTE prophylaxis is indicated for most patients (in the absence of a contraindication)
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
## 2021-12-04 NOTE — H&P ADULT - NSHPLABSRESULTS_GEN_ALL_CORE
LABS:                        14.1   7.94  )-----------( 184      ( 03 Dec 2021 21:48 )             44.4     12-03    146<H>  |  106  |  16  ----------------------------<  95  4.2   |  27  |  1.23    Ca    10.0      03 Dec 2021 21:48    TPro  8.1  /  Alb  3.7  /  TBili  0.2  /  DBili  x   /  AST  19  /  ALT  23  /  AlkPhos  72  12-03      Urinalysis Basic - ( 03 Dec 2021 09:20 )    Color: Yellow / Appearance: very cloudy / S.020 / pH: x  Gluc: x / Ketone: Negative  / Bili: Negative / Urobili: Negative   Blood: x / Protein: 100 / Nitrite: Positive   Leuk Esterase: Moderate / RBC: 0-4 /HPF / WBC >50 /HPF   Sq Epi: x / Non Sq Epi: Neg.-Few / Bacteria: Many /HPF    CAPILLARY BLOOD GLUCOSE    Urinalysis Basic - ( 03 Dec 2021 09:20 )    Color: Yellow / Appearance: very cloudy / S.020 / pH: x  Gluc: x / Ketone: Negative  / Bili: Negative / Urobili: Negative   Blood: x / Protein: 100 / Nitrite: Positive   Leuk Esterase: Moderate / RBC: 0-4 /HPF / WBC >50 /HPF   Sq Epi: x / Non Sq Epi: Neg.-Few / Bacteria: Many /HPF    RADIOLOGY & ADDITIONAL TESTS:      Consultant(s) Notes Reviewed:  [x ] YES  [ ] NO  Care Discussed with Consultants/Other Providers [ x] YES  [ ] NO  Imaging Personally Reviewed:  [x ] YES  [ ] NO

## 2021-12-05 LAB
ALBUMIN SERPL ELPH-MCNC: 3.1 G/DL — LOW (ref 3.3–5)
ALP SERPL-CCNC: 57 U/L — SIGNIFICANT CHANGE UP (ref 40–120)
ALT FLD-CCNC: 21 U/L — SIGNIFICANT CHANGE UP (ref 10–45)
ANION GAP SERPL CALC-SCNC: 8 MMOL/L — SIGNIFICANT CHANGE UP (ref 5–17)
AST SERPL-CCNC: 18 U/L — SIGNIFICANT CHANGE UP (ref 10–40)
BILIRUB SERPL-MCNC: 0.2 MG/DL — SIGNIFICANT CHANGE UP (ref 0.2–1.2)
BUN SERPL-MCNC: 14 MG/DL — SIGNIFICANT CHANGE UP (ref 7–23)
CALCIUM SERPL-MCNC: 9.4 MG/DL — SIGNIFICANT CHANGE UP (ref 8.4–10.5)
CHLORIDE SERPL-SCNC: 107 MMOL/L — SIGNIFICANT CHANGE UP (ref 96–108)
CO2 SERPL-SCNC: 29 MMOL/L — SIGNIFICANT CHANGE UP (ref 22–31)
CREAT SERPL-MCNC: 1.22 MG/DL — SIGNIFICANT CHANGE UP (ref 0.5–1.3)
GLUCOSE SERPL-MCNC: 92 MG/DL — SIGNIFICANT CHANGE UP (ref 70–99)
HCT VFR BLD CALC: 40 % — SIGNIFICANT CHANGE UP (ref 34.5–45)
HGB BLD-MCNC: 12.8 G/DL — SIGNIFICANT CHANGE UP (ref 11.5–15.5)
MCHC RBC-ENTMCNC: 28.3 PG — SIGNIFICANT CHANGE UP (ref 27–34)
MCHC RBC-ENTMCNC: 32 GM/DL — SIGNIFICANT CHANGE UP (ref 32–36)
MCV RBC AUTO: 88.5 FL — SIGNIFICANT CHANGE UP (ref 80–100)
NRBC # BLD: 0 /100 WBCS — SIGNIFICANT CHANGE UP (ref 0–0)
PLATELET # BLD AUTO: 297 K/UL — SIGNIFICANT CHANGE UP (ref 150–400)
POTASSIUM SERPL-MCNC: 3.9 MMOL/L — SIGNIFICANT CHANGE UP (ref 3.5–5.3)
POTASSIUM SERPL-SCNC: 3.9 MMOL/L — SIGNIFICANT CHANGE UP (ref 3.5–5.3)
PROT SERPL-MCNC: 6.8 G/DL — SIGNIFICANT CHANGE UP (ref 6–8.3)
RBC # BLD: 4.52 M/UL — SIGNIFICANT CHANGE UP (ref 3.8–5.2)
RBC # FLD: 13.4 % — SIGNIFICANT CHANGE UP (ref 10.3–14.5)
SODIUM SERPL-SCNC: 144 MMOL/L — SIGNIFICANT CHANGE UP (ref 135–145)
WBC # BLD: 8.8 K/UL — SIGNIFICANT CHANGE UP (ref 3.8–10.5)
WBC # FLD AUTO: 8.8 K/UL — SIGNIFICANT CHANGE UP (ref 3.8–10.5)

## 2021-12-05 PROCEDURE — 99233 SBSQ HOSP IP/OBS HIGH 50: CPT

## 2021-12-05 RX ADMIN — GABAPENTIN 100 MILLIGRAM(S): 400 CAPSULE ORAL at 21:38

## 2021-12-05 RX ADMIN — Medication 975 MILLIGRAM(S): at 22:09

## 2021-12-05 RX ADMIN — Medication 975 MILLIGRAM(S): at 21:39

## 2021-12-05 RX ADMIN — DULOXETINE HYDROCHLORIDE 20 MILLIGRAM(S): 30 CAPSULE, DELAYED RELEASE ORAL at 17:33

## 2021-12-05 RX ADMIN — Medication 975 MILLIGRAM(S): at 06:09

## 2021-12-05 RX ADMIN — Medication 5 MILLIGRAM(S): at 21:38

## 2021-12-05 RX ADMIN — DULOXETINE HYDROCHLORIDE 20 MILLIGRAM(S): 30 CAPSULE, DELAYED RELEASE ORAL at 05:39

## 2021-12-05 RX ADMIN — PIPERACILLIN AND TAZOBACTAM 25 GRAM(S): 4; .5 INJECTION, POWDER, LYOPHILIZED, FOR SOLUTION INTRAVENOUS at 02:19

## 2021-12-05 RX ADMIN — Medication 1 PATCH: at 19:33

## 2021-12-05 RX ADMIN — Medication 975 MILLIGRAM(S): at 13:31

## 2021-12-05 RX ADMIN — SIMVASTATIN 10 MILLIGRAM(S): 20 TABLET, FILM COATED ORAL at 21:38

## 2021-12-05 RX ADMIN — OXYCODONE HYDROCHLORIDE 5 MILLIGRAM(S): 5 TABLET ORAL at 22:09

## 2021-12-05 RX ADMIN — Medication 975 MILLIGRAM(S): at 14:31

## 2021-12-05 RX ADMIN — ENOXAPARIN SODIUM 40 MILLIGRAM(S): 100 INJECTION SUBCUTANEOUS at 11:57

## 2021-12-05 RX ADMIN — Medication 75 MICROGRAM(S): at 05:39

## 2021-12-05 RX ADMIN — Medication 975 MILLIGRAM(S): at 05:39

## 2021-12-05 RX ADMIN — SENNA PLUS 2 TABLET(S): 8.6 TABLET ORAL at 21:38

## 2021-12-05 RX ADMIN — PIPERACILLIN AND TAZOBACTAM 25 GRAM(S): 4; .5 INJECTION, POWDER, LYOPHILIZED, FOR SOLUTION INTRAVENOUS at 18:13

## 2021-12-05 RX ADMIN — PIPERACILLIN AND TAZOBACTAM 25 GRAM(S): 4; .5 INJECTION, POWDER, LYOPHILIZED, FOR SOLUTION INTRAVENOUS at 11:57

## 2021-12-05 RX ADMIN — GABAPENTIN 100 MILLIGRAM(S): 400 CAPSULE ORAL at 13:31

## 2021-12-05 RX ADMIN — OXYCODONE HYDROCHLORIDE 5 MILLIGRAM(S): 5 TABLET ORAL at 21:39

## 2021-12-05 RX ADMIN — GABAPENTIN 100 MILLIGRAM(S): 400 CAPSULE ORAL at 05:39

## 2021-12-05 RX ADMIN — Medication 1 PATCH: at 11:57

## 2021-12-05 RX ADMIN — Medication 0.5 MILLIGRAM(S): at 21:38

## 2021-12-05 RX ADMIN — POLYETHYLENE GLYCOL 3350 17 GRAM(S): 17 POWDER, FOR SOLUTION ORAL at 11:57
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
## 2021-12-06 DIAGNOSIS — R41.0 DISORIENTATION, UNSPECIFIED: ICD-10-CM

## 2021-12-06 DIAGNOSIS — G62.9 POLYNEUROPATHY, UNSPECIFIED: ICD-10-CM

## 2021-12-06 DIAGNOSIS — R46.89 OTHER SYMPTOMS AND SIGNS INVOLVING APPEARANCE AND BEHAVIOR: ICD-10-CM

## 2021-12-06 DIAGNOSIS — E03.9 HYPOTHYROIDISM, UNSPECIFIED: ICD-10-CM

## 2021-12-06 DIAGNOSIS — N39.0 URINARY TRACT INFECTION, SITE NOT SPECIFIED: ICD-10-CM

## 2021-12-06 DIAGNOSIS — Z86.59 PERSONAL HISTORY OF OTHER MENTAL AND BEHAVIORAL DISORDERS: ICD-10-CM

## 2021-12-06 DIAGNOSIS — F41.9 ANXIETY DISORDER, UNSPECIFIED: ICD-10-CM

## 2021-12-06 DIAGNOSIS — I10 ESSENTIAL (PRIMARY) HYPERTENSION: ICD-10-CM

## 2021-12-06 PROCEDURE — 99233 SBSQ HOSP IP/OBS HIGH 50: CPT

## 2021-12-06 PROCEDURE — 76770 US EXAM ABDO BACK WALL COMP: CPT | Mod: 26

## 2021-12-06 RX ORDER — FAMOTIDINE 10 MG/ML
20 INJECTION INTRAVENOUS DAILY
Refills: 0 | Status: DISCONTINUED | OUTPATIENT
Start: 2021-12-06 | End: 2021-12-07

## 2021-12-06 RX ADMIN — Medication 975 MILLIGRAM(S): at 05:42

## 2021-12-06 RX ADMIN — Medication 75 MICROGRAM(S): at 05:42

## 2021-12-06 RX ADMIN — Medication 975 MILLIGRAM(S): at 21:22

## 2021-12-06 RX ADMIN — PIPERACILLIN AND TAZOBACTAM 25 GRAM(S): 4; .5 INJECTION, POWDER, LYOPHILIZED, FOR SOLUTION INTRAVENOUS at 03:04

## 2021-12-06 RX ADMIN — OXYCODONE HYDROCHLORIDE 5 MILLIGRAM(S): 5 TABLET ORAL at 05:42

## 2021-12-06 RX ADMIN — Medication 975 MILLIGRAM(S): at 06:12

## 2021-12-06 RX ADMIN — GABAPENTIN 100 MILLIGRAM(S): 400 CAPSULE ORAL at 05:42

## 2021-12-06 RX ADMIN — OXYCODONE HYDROCHLORIDE 5 MILLIGRAM(S): 5 TABLET ORAL at 06:12

## 2021-12-06 RX ADMIN — PIPERACILLIN AND TAZOBACTAM 25 GRAM(S): 4; .5 INJECTION, POWDER, LYOPHILIZED, FOR SOLUTION INTRAVENOUS at 18:28

## 2021-12-06 RX ADMIN — GABAPENTIN 100 MILLIGRAM(S): 400 CAPSULE ORAL at 21:22

## 2021-12-06 RX ADMIN — Medication 1 PATCH: at 11:46

## 2021-12-06 RX ADMIN — Medication 1 PATCH: at 18:29

## 2021-12-06 RX ADMIN — Medication 5 MILLIGRAM(S): at 21:23

## 2021-12-06 RX ADMIN — SIMVASTATIN 10 MILLIGRAM(S): 20 TABLET, FILM COATED ORAL at 21:22

## 2021-12-06 RX ADMIN — DULOXETINE HYDROCHLORIDE 20 MILLIGRAM(S): 30 CAPSULE, DELAYED RELEASE ORAL at 05:42

## 2021-12-06 RX ADMIN — Medication 975 MILLIGRAM(S): at 21:30

## 2021-12-06 RX ADMIN — PIPERACILLIN AND TAZOBACTAM 25 GRAM(S): 4; .5 INJECTION, POWDER, LYOPHILIZED, FOR SOLUTION INTRAVENOUS at 11:54

## 2021-12-06 RX ADMIN — SENNA PLUS 2 TABLET(S): 8.6 TABLET ORAL at 21:22

## 2021-12-06 RX ADMIN — Medication 1 PATCH: at 07:40

## 2021-12-06 RX ADMIN — DULOXETINE HYDROCHLORIDE 20 MILLIGRAM(S): 30 CAPSULE, DELAYED RELEASE ORAL at 18:28

## 2021-12-06 RX ADMIN — Medication 1 PATCH: at 11:55

## 2021-12-06 RX ADMIN — FAMOTIDINE 20 MILLIGRAM(S): 10 INJECTION INTRAVENOUS at 11:54

## 2021-12-06 RX ADMIN — Medication 6 MILLIGRAM(S): at 21:22

## 2021-12-06 RX ADMIN — Medication 0.5 MILLIGRAM(S): at 21:22

## 2021-12-06 RX ADMIN — ENOXAPARIN SODIUM 40 MILLIGRAM(S): 100 INJECTION SUBCUTANEOUS at 11:55

## 2021-12-06 NOTE — ADVANCED PRACTICE NURSE CONSULT - RECOMMEDATIONS
Suggest apply barrier cream with each diaper change.  Frequent rounding to ensure patient remains clean & dry.  Turn & position every 2 hours to prevent skin breakdown.

## 2021-12-06 NOTE — BH CONSULTATION LIAISON PROGRESS NOTE - CURRENT MEDICATION
MEDICATIONS  (STANDING):  acetaminophen     Tablet .. 975 milliGRAM(s) Oral every 8 hours  bisacodyl 5 milliGRAM(s) Oral at bedtime  DULoxetine 20 milliGRAM(s) Oral two times a day  enoxaparin Injectable 40 milliGRAM(s) SubCutaneous daily  famotidine    Tablet 20 milliGRAM(s) Oral daily  gabapentin 100 milliGRAM(s) Oral three times a day  influenza  Vaccine (HIGH DOSE) 0.7 milliLiter(s) IntraMuscular once  levothyroxine 75 MICROGram(s) Oral daily  melatonin 6 milliGRAM(s) Oral at bedtime  nicotine -  14 mG/24Hr(s) Patch 1 patch Transdermal daily  piperacillin/tazobactam IVPB.. 3.375 Gram(s) IV Intermittent every 8 hours  polyethylene glycol 3350 17 Gram(s) Oral daily  senna 2 Tablet(s) Oral at bedtime  simvastatin 10 milliGRAM(s) Oral at bedtime    MEDICATIONS  (PRN):  ALPRAZolam 0.5 milliGRAM(s) Oral two times a day PRN anxiety  oxyCODONE    IR 5 milliGRAM(s) Oral every 4 hours PRN Mild Pain (1 - 3)  oxyCODONE    IR 10 milliGRAM(s) Oral every 6 hours PRN Severe Pain (7 - 10)  
MEDICATIONS  (STANDING):  acetaminophen     Tablet .. 975 milliGRAM(s) Oral every 8 hours  bisacodyl 5 milliGRAM(s) Oral at bedtime  DULoxetine 20 milliGRAM(s) Oral two times a day  enoxaparin Injectable 40 milliGRAM(s) SubCutaneous daily  gabapentin 100 milliGRAM(s) Oral three times a day  levothyroxine 75 MICROGram(s) Oral daily  melatonin 6 milliGRAM(s) Oral at bedtime  nicotine -  14 mG/24Hr(s) Patch 1 patch Transdermal daily  piperacillin/tazobactam IVPB.. 3.375 Gram(s) IV Intermittent every 8 hours  polyethylene glycol 3350 17 Gram(s) Oral daily  senna 2 Tablet(s) Oral at bedtime  simvastatin 10 milliGRAM(s) Oral at bedtime    MEDICATIONS  (PRN):  ALPRAZolam 0.5 milliGRAM(s) Oral two times a day PRN anxiety  oxyCODONE    IR 5 milliGRAM(s) Oral every 4 hours PRN Mild Pain (1 - 3)  oxyCODONE    IR 10 milliGRAM(s) Oral every 6 hours PRN Severe Pain (7 - 10)

## 2021-12-06 NOTE — BH CONSULTATION LIAISON PROGRESS NOTE - NSICDXBHTERTIARYDX_PSY_ALL_CORE
R/O Marital/partner relational problem   Z63.0  R/O Subacute delirium due to underlying condition   F05  
R/O Marital/partner relational problem   Z63.0  R/O Subacute delirium due to underlying condition   F05
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
## 2021-12-06 NOTE — BH CONSULTATION LIAISON PROGRESS NOTE - OTHER
Chronic marital discord, daughter with disabilities
Chronic marital discord, daughter with disabilities

## 2021-12-06 NOTE — BH CONSULTATION LIAISON PROGRESS NOTE - NSBHCHARTREVIEWVS_PSY_A_CORE FT
Vital Signs Last 24 Hrs  T(C): 36.8 (06 Dec 2021 13:31), Max: 36.9 (06 Dec 2021 08:00)  T(F): 98.2 (06 Dec 2021 13:31), Max: 98.5 (06 Dec 2021 08:00)  HR: 75 (06 Dec 2021 13:31) (64 - 75)  BP: 131/77 (06 Dec 2021 13:31) (118/59 - 138/79)  BP(mean): --  RR: 16 (06 Dec 2021 13:31) (14 - 19)  SpO2: 96% (06 Dec 2021 13:31) (95% - 99%)
Vital Signs Last 24 Hrs  T(C): 36.5 (04 Dec 2021 09:42), Max: 36.9 (03 Dec 2021 21:17)  T(F): 97.7 (04 Dec 2021 09:42), Max: 98.4 (03 Dec 2021 21:17)  HR: 70 (04 Dec 2021 09:42) (70 - 92)  BP: 130/79 (04 Dec 2021 09:42) (130/79 - 130/84)  BP(mean): --  RR: 18 (04 Dec 2021 09:42) (18 - 20)  SpO2: 98% (04 Dec 2021 09:42) (98% - 99%)

## 2021-12-06 NOTE — CONSULT NOTE ADULT - SUBJECTIVE AND OBJECTIVE BOX
HPI:   Patient is a 76y female with a past history of agoraphobia,arthritis,HTN,HLD,PTSD,hypothyroidism,and behavioral issues who was admitted 12/4 with combative home behavior.  This is 3rd hospitalization in past 6 weeks, she was admitted 10/27-10/30 and 11/1-11/4. Those admissions were also due to combative behavior and delirium as well as concerns for UTI.She had fever and sepsis criteria with one admission, received a course of CTX and ceftin and than a course of cipro.  She reportedly wears a diaper at home and sits for periods of time in her excrement.  She was admitted with aggressive behavior, was throwing objects, and  brought her to ER.No fever and no systemic signs of infection, UA was abnormal and zosyn started.  She is alert, calm, and offers no localizing complaints this AM.She reportedly is non ambulatory and denies any pelvic pain or dysuria.    REVIEW OF SYSTEMS:  All other review of systems negative (Comprehensive ROS)    PAST MEDICAL & SURGICAL HISTORY:  PTSD (post-traumatic stress disorder)    Anxiety    High cholesterol    Hypertension    Agoraphobia  severe as per  - has not left house since 2016 (is being seen by NP at home)    Chronic back pain    No significant past surgical history        Allergies    epinephrine (Unknown)  norepinephrine (Unknown)    Intolerances        Antimicrobials Day #  :day 2  piperacillin/tazobactam IVPB.. 3.375 Gram(s) IV Intermittent every 8 hours    Other Medications:  acetaminophen     Tablet .. 975 milliGRAM(s) Oral every 8 hours  ALPRAZolam 0.5 milliGRAM(s) Oral two times a day PRN  bisacodyl 5 milliGRAM(s) Oral at bedtime  DULoxetine 20 milliGRAM(s) Oral two times a day  enoxaparin Injectable 40 milliGRAM(s) SubCutaneous daily  famotidine    Tablet 20 milliGRAM(s) Oral daily  gabapentin 100 milliGRAM(s) Oral three times a day  influenza  Vaccine (HIGH DOSE) 0.7 milliLiter(s) IntraMuscular once  levothyroxine 75 MICROGram(s) Oral daily  melatonin 6 milliGRAM(s) Oral at bedtime  nicotine -  14 mG/24Hr(s) Patch 1 patch Transdermal daily  oxyCODONE    IR 5 milliGRAM(s) Oral every 4 hours PRN  oxyCODONE    IR 10 milliGRAM(s) Oral every 6 hours PRN  polyethylene glycol 3350 17 Gram(s) Oral daily  senna 2 Tablet(s) Oral at bedtime  simvastatin 10 milliGRAM(s) Oral at bedtime      FAMILY HISTORY:  No pertinent family history in first degree relatives        SOCIAL HISTORY:  Smoking: yes    ETOH:  ?   Drug Use: ?       T(F): 98.5 (12-06-21 @ 08:00), Max: 98.5 (12-06-21 @ 08:00)  HR: 67 (12-06-21 @ 08:00)  BP: 131/75 (12-06-21 @ 08:00)  RR: 14 (12-06-21 @ 08:00)  SpO2: 98% (12-06-21 @ 08:00)  Wt(kg): --    PHYSICAL EXAM:  General: alert, no acute distress  Eyes:  anicteric, no conjunctival injection, no discharge  Oropharynx: no lesions or injection 	  Neck: supple, without adenopathy  Lungs: clear to auscultation  Heart: regular rate and rhythm; no murmur, rubs or gallops  Abdomen: soft, nondistended, nontender, without mass or organomegaly  Skin: no lesions  Extremities: no clubbing, cyanosis, or edema  Neurologic: alert, oriented, moves all extremities    LAB RESULTS:                        12.8   8.80  )-----------( 297      ( 05 Dec 2021 05:05 )             40.0     12-05    144  |  107  |  14  ----------------------------<  92  3.9   |  29  |  1.22    Ca    9.4      05 Dec 2021 05:05    TPro  6.8  /  Alb  3.1<L>  /  TBili  0.2  /  DBili  x   /  AST  18  /  ALT  21  /  AlkPhos  57  12-05    LIVER FUNCTIONS - ( 05 Dec 2021 05:05 )  Alb: 3.1 g/dL / Pro: 6.8 g/dL / ALK PHOS: 57 U/L / ALT: 21 U/L / AST: 18 U/L / GGT: x           UA > 50 wbc      MICROBIOLOGY:  RECENT CULTURES:  RVP negative      RADIOLOGY REVIEWED:  < from: Xray Chest 1 View- PORTABLE-Urgent (Xray Chest 1 View- PORTABLE-Urgent .) (12.04.21 @ 10:36) >  IMPRESSION:   No radiographic evidence of active chest disease.    < end of copied text >  < from: CT Abdomen and Pelvis No Cont (08.30.21 @ 20:43) >  IMPRESSION:  Study is limited without intravenous contrast. Mild left lateral abdominal wall subcutaneous stranding, may be postraumatic in etiology.    < end of copied text >

## 2021-12-06 NOTE — BH CONSULTATION LIAISON PROGRESS NOTE - NSBHCHARTREVIEWINVESTIGATE_PSY_A_CORE FT
Urine Microscopic-Add On (NC) (12.03.21 @ 09:20)    Red Blood Cell - Urine: 0-4 /HPF    White Blood Cell - Urine: >50 /HPF    Bacteria: Many /HPF    Epithelial Cells: Neg.-Few    Comment - Urine: few mucous thread    Head CT unremarkable,  urine tox + for benzodiazepines. 
Urine Microscopic-Add On (NC) (12.03.21 @ 09:20)    Red Blood Cell - Urine: 0-4 /HPF    White Blood Cell - Urine: >50 /HPF    Bacteria: Many /HPF    Epithelial Cells: Neg.-Few    Comment - Urine: few mucous thread    Head CT unremarkable,  urine tox + for benzodiazepines.

## 2021-12-06 NOTE — BH CONSULTATION LIAISON PROGRESS NOTE - NSBHCONSULTPRIMARYDISCUSSYES_PSY_A_CORE FT
case d/w Dr. Stuart attending.  Telepsych notes read and appreciated. 
case d/w Dr. Mercedes attending.  Telepsych notes read and appreciated.

## 2021-12-06 NOTE — ADVANCED PRACTICE NURSE CONSULT - ASSESSMENT
Patient A&O, seen in bed, using Prima-Fit external urinary catheter, diapered for stool incontinence.   There is a small area of erythema in the gluteal cleft.  The skin is not open, no wound present.  It appears as a closed or reepithelialized fissure, 1 x 0.2 cm redness.
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
## 2021-12-06 NOTE — BH CONSULTATION LIAISON PROGRESS NOTE - NSBHCONSULTMEDANXIETY_PSY_A_CORE FT
as per MAR Xanax 0.5 mg bid, hold for excessive sedation. 
as per MAR Xanax 0.5 mg bid, hold for excessive sedation.

## 2021-12-06 NOTE — CONSULT NOTE ADULT - ASSESSMENT
Patient is a 76y female with a past history of agoraphobia,arthritis,HTN,HLD,PTSD,hypothyroidism,and behavioral issues who was admitted 12/4 with combative home behavior.  This is 3rd hospitalization in past 6 weeks, she was admitted 10/27-10/30 and 11/1-11/4. Those admissions were also due to combative behavior and delirium as well as concerns for UTI.She had fever and sepsis criteria with one admission, received a course of CTX and ceftin and than a course of cipro.  She reportedly wears a diaper at home and sits for periods of time in her excrement.  She was admitted with aggressive behavior, was throwing objects, and  brought her to ER.No fever and no systemic signs of infection, UA was abnormal and zosyn started.  She is alert, calm, and offers no localizing complaints this AM.She reportedly is non ambulatory and denies any pelvic pain or dysuria.  She has received multiple courses of antibiotics over the past 6 weeks with one positive urine culture with citrobacter and one negative one.  I do not think we can blame infection as cause of admission, she was afebrile with a normal wbc and no sepsis criteria.  Hence , we are dealing with approach to pyuria in an unkempt patient.No urine cultures was sent.  It can be difficult at times to distinguish between asymptomatic bactiuria and true infection, pyuria is not always reliable.  Suggest:  1.Check renal/bladder sonogram to r/o stones, obstruction although CT of A/P in August was negative  2.Limit zosyn to 3 days max, would d/c after AM dose tomorrow  3.Check for retention with bladder scan  4.I suspect better hygiene will be important with limiting further infections.
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
## 2021-12-06 NOTE — BH CONSULTATION LIAISON PROGRESS NOTE - NSBHCHARTREVIEWLAB_PSY_A_CORE FT
12-03    146<H>  |  106  |  16  ----------------------------<  95  4.2   |  27  |  1.23    Ca    10.0      03 Dec 2021 21:48    TPro  8.1  /  Alb  3.7  /  TBili  0.2  /  DBili  x   /  AST  19  /  ALT  23  /  AlkPhos  72  12-03                          14.1   7.94  )-----------( 184      ( 03 Dec 2021 21:48 )             44.4     
12-03    146<H>  |  106  |  16  ----------------------------<  95  4.2   |  27  |  1.23    Ca    10.0      03 Dec 2021 21:48    TPro  8.1  /  Alb  3.7  /  TBili  0.2  /  DBili  x   /  AST  19  /  ALT  23  /  AlkPhos  72  12-03                          14.1   7.94  )-----------( 184      ( 03 Dec 2021 21:48 )             44.4

## 2021-12-07 ENCOUNTER — TRANSCRIPTION ENCOUNTER (OUTPATIENT)
Age: 76
End: 2021-12-07

## 2021-12-07 VITALS
OXYGEN SATURATION: 99 % | RESPIRATION RATE: 16 BRPM | TEMPERATURE: 98 F | DIASTOLIC BLOOD PRESSURE: 70 MMHG | HEART RATE: 70 BPM | SYSTOLIC BLOOD PRESSURE: 110 MMHG

## 2021-12-07 LAB
ALBUMIN SERPL ELPH-MCNC: 3.2 G/DL — LOW (ref 3.3–5)
ANION GAP SERPL CALC-SCNC: 10 MMOL/L — SIGNIFICANT CHANGE UP (ref 5–17)
BUN SERPL-MCNC: 12 MG/DL — SIGNIFICANT CHANGE UP (ref 7–23)
CALCIUM SERPL-MCNC: 9.4 MG/DL — SIGNIFICANT CHANGE UP (ref 8.4–10.5)
CHLORIDE SERPL-SCNC: 106 MMOL/L — SIGNIFICANT CHANGE UP (ref 96–108)
CO2 SERPL-SCNC: 25 MMOL/L — SIGNIFICANT CHANGE UP (ref 22–31)
CREAT SERPL-MCNC: 1.07 MG/DL — SIGNIFICANT CHANGE UP (ref 0.5–1.3)
FOLATE SERPL-MCNC: 19.5 NG/ML — SIGNIFICANT CHANGE UP
GLUCOSE SERPL-MCNC: 95 MG/DL — SIGNIFICANT CHANGE UP (ref 70–99)
PHOSPHATE SERPL-MCNC: 4.6 MG/DL — HIGH (ref 2.5–4.5)
POTASSIUM SERPL-MCNC: 3.4 MMOL/L — LOW (ref 3.5–5.3)
POTASSIUM SERPL-SCNC: 3.4 MMOL/L — LOW (ref 3.5–5.3)
SARS-COV-2 RNA SPEC QL NAA+PROBE: SIGNIFICANT CHANGE UP
SODIUM SERPL-SCNC: 141 MMOL/L — SIGNIFICANT CHANGE UP (ref 135–145)
TSH SERPL-MCNC: 0.17 UIU/ML — LOW (ref 0.36–3.74)
VIT B12 SERPL-MCNC: 724 PG/ML — SIGNIFICANT CHANGE UP (ref 232–1245)

## 2021-12-07 PROCEDURE — 99232 SBSQ HOSP IP/OBS MODERATE 35: CPT

## 2021-12-07 PROCEDURE — 99497 ADVNCD CARE PLAN 30 MIN: CPT

## 2021-12-07 RX ORDER — POTASSIUM CHLORIDE 20 MEQ
40 PACKET (EA) ORAL ONCE
Refills: 0 | Status: COMPLETED | OUTPATIENT
Start: 2021-12-07 | End: 2021-12-07

## 2021-12-07 RX ORDER — LEVOTHYROXINE SODIUM 125 MCG
1 TABLET ORAL
Qty: 30 | Refills: 0
Start: 2021-12-07 | End: 2022-01-05

## 2021-12-07 RX ORDER — LANOLIN ALCOHOL/MO/W.PET/CERES
2 CREAM (GRAM) TOPICAL
Qty: 60 | Refills: 0
Start: 2021-12-07 | End: 2022-01-05

## 2021-12-07 RX ORDER — LEVOTHYROXINE SODIUM 125 MCG
1 TABLET ORAL
Qty: 0 | Refills: 0 | DISCHARGE

## 2021-12-07 RX ORDER — FAMOTIDINE 10 MG/ML
1 INJECTION INTRAVENOUS
Qty: 30 | Refills: 0
Start: 2021-12-07 | End: 2022-01-05

## 2021-12-07 RX ORDER — LEVOTHYROXINE SODIUM 125 MCG
50 TABLET ORAL DAILY
Refills: 0 | Status: DISCONTINUED | OUTPATIENT
Start: 2021-12-08 | End: 2021-12-07

## 2021-12-07 RX ORDER — SENNA PLUS 8.6 MG/1
2 TABLET ORAL
Qty: 20 | Refills: 0
Start: 2021-12-07

## 2021-12-07 RX ORDER — POLYETHYLENE GLYCOL 3350 17 G/17G
17 POWDER, FOR SOLUTION ORAL
Qty: 278 | Refills: 0
Start: 2021-12-07

## 2021-12-07 RX ADMIN — Medication 1 PATCH: at 11:19

## 2021-12-07 RX ADMIN — FAMOTIDINE 20 MILLIGRAM(S): 10 INJECTION INTRAVENOUS at 11:17

## 2021-12-07 RX ADMIN — PIPERACILLIN AND TAZOBACTAM 25 GRAM(S): 4; .5 INJECTION, POWDER, LYOPHILIZED, FOR SOLUTION INTRAVENOUS at 04:06

## 2021-12-07 RX ADMIN — ENOXAPARIN SODIUM 40 MILLIGRAM(S): 100 INJECTION SUBCUTANEOUS at 11:17

## 2021-12-07 RX ADMIN — GABAPENTIN 100 MILLIGRAM(S): 400 CAPSULE ORAL at 14:44

## 2021-12-07 RX ADMIN — DULOXETINE HYDROCHLORIDE 20 MILLIGRAM(S): 30 CAPSULE, DELAYED RELEASE ORAL at 06:06

## 2021-12-07 RX ADMIN — Medication 75 MICROGRAM(S): at 06:06

## 2021-12-07 RX ADMIN — OXYCODONE HYDROCHLORIDE 10 MILLIGRAM(S): 5 TABLET ORAL at 12:17

## 2021-12-07 RX ADMIN — PIPERACILLIN AND TAZOBACTAM 25 GRAM(S): 4; .5 INJECTION, POWDER, LYOPHILIZED, FOR SOLUTION INTRAVENOUS at 10:56

## 2021-12-07 RX ADMIN — Medication 40 MILLIEQUIVALENT(S): at 11:17

## 2021-12-07 RX ADMIN — Medication 975 MILLIGRAM(S): at 15:44

## 2021-12-07 RX ADMIN — OXYCODONE HYDROCHLORIDE 10 MILLIGRAM(S): 5 TABLET ORAL at 11:17

## 2021-12-07 RX ADMIN — Medication 1 PATCH: at 08:27

## 2021-12-07 RX ADMIN — DULOXETINE HYDROCHLORIDE 20 MILLIGRAM(S): 30 CAPSULE, DELAYED RELEASE ORAL at 17:05

## 2021-12-07 RX ADMIN — GABAPENTIN 100 MILLIGRAM(S): 400 CAPSULE ORAL at 06:06

## 2021-12-07 RX ADMIN — Medication 1 PATCH: at 11:17

## 2021-12-07 RX ADMIN — Medication 975 MILLIGRAM(S): at 06:06

## 2021-12-07 RX ADMIN — Medication 975 MILLIGRAM(S): at 14:44

## 2021-12-07 NOTE — PHYSICAL THERAPY INITIAL EVALUATION ADULT - PERTINENT HX OF CURRENT PROBLEM, REHAB EVAL
77 y/o F PMH HLD. hypothyroid, peripheral neuropathy, anxiety, bedbound due to arthritis, recurrent UTI with hospitalizations 10/27-10/30 and 11/1-11/4 brought in by  for combativeness, psych eval.

## 2021-12-07 NOTE — DISCHARGE NOTE NURSING/CASE MANAGEMENT/SOCIAL WORK - NSDCVIVACCINE_GEN_ALL_CORE_FT
Tdap; 14-Apr-2021 15:25; Chantelle Meehan (RN); [employer]; [id number] (Exp. Date: 21-Jul-2022); IntraMuscular; Deltoid Right.; 0.5 milliLiter(s); VIS (VIS Published: 09-May-2013, VIS Presented: 14-Apr-2021);
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
## 2021-12-07 NOTE — PHYSICAL THERAPY INITIAL EVALUATION ADULT - ADDITIONAL COMMENTS
pt lives w/spouse & dtr in private house, ramp to enter, 1 aleks, no stairs in house that pt uses. pt is minimally ambulatory; transfers to wheelchair w/rollator and assist from spouse. pt also has a straight cane and shower chair, grab bars, pt has HHA 7 hrs/day

## 2021-12-07 NOTE — DISCHARGE NOTE NURSING/CASE MANAGEMENT/SOCIAL WORK - PATIENT PORTAL LINK FT
You can access the FollowMyHealth Patient Portal offered by [facility] by registering at the following website: [email or web address]. By joining [facility]’s FollowMyHealth portal, you will also be able to view your health information using other applications (apps) compatible with our system.

## 2021-12-07 NOTE — GOALS OF CARE CONVERSATION - ADVANCED CARE PLANNING - CONVERSATION DETAILS
Met and spoke with pt today bedside. Pt said she is feeling better and will probably be going home today. Explained my reason for visit, pt understood. I asked her about any paperwork or advanced directives, pt said she already had a dnr but did not know where it was. I asked about a HCP , pt is aware of what that is and she stated she does not have one yet, and is unsure who it would be. I gave her a HCP form to review, she stated she will have to think about it. We discussed cpr and intubation, pt stated she does not want any of those interventions performed. We read and completed a Molst form today to reflect her wishes. I explained she will take home the original pink form to her home, but also was given a copy.

## 2021-12-07 NOTE — DISCHARGE NOTE PROVIDER - CARE PROVIDERS DIRECT ADDRESSES
[email or web address],DirectAddress_Unknown,DirectAddress_Unknown

## 2021-12-07 NOTE — PROGRESS NOTE ADULT - PROBLEM SELECTOR PROBLEM 4
Confusion
Confusion
Mercedes Flap Text: The defect edges were debeveled with a #15 scalpel blade.  Given the location of the defect, shape of the defect and the proximity to free margins a Mercedes flap was deemed most appropriate.  Using a sterile surgical marker, an appropriate advancement flap was drawn incorporating the defect and placing the expected incisions within the relaxed skin tension lines where possible. The area thus outlined was incised deep to adipose tissue with a #15 scalpel blade.  The skin margins were undermined to an appropriate distance in all directions utilizing iris scissors.

## 2021-12-07 NOTE — PROGRESS NOTE ADULT - SUBJECTIVE AND OBJECTIVE BOX
CC: f/u for  pyuria  Patient reports she feels fine    REVIEW OF SYSTEMS:  All other review of systems negative (Comprehensive ROS)    Antimicrobials Day #  :    Other Medications Reviewed    T(F): 97.8 (12-07-21 @ 05:06), Max: 99.3 (12-06-21 @ 19:45)  HR: 61 (12-07-21 @ 05:06)  BP: 137/64 (12-07-21 @ 05:06)  RR: 18 (12-07-21 @ 05:06)  SpO2: 99% (12-07-21 @ 05:06)  Wt(kg): --    PHYSICAL EXAM:  General: alert, no acute distress  Eyes:  anicteric, no conjunctival injection, no discharge  Oropharynx: no lesions or injection 	  Neck: supple, without adenopathy  Lungs: clear to auscultation  Heart: regular rate and rhythm; no murmur, rubs or gallops  Abdomen: soft, nondistended, nontender, without mass or organomegaly  Skin: no lesions  Extremities: no clubbing, cyanosis, or edema  Neurologic: alert, oriented, moves all extremities    LAB RESULTS:    12-07    141  |  106  |  12  ----------------------------<  95  3.4<L>   |  25  |  1.07    Ca    9.4      07 Dec 2021 05:00  Phos  4.6     12-07    TPro  x   /  Alb  3.2<L>  /  TBili  x   /  DBili  x   /  AST  x   /  ALT  x   /  AlkPhos  x   12-07    LIVER FUNCTIONS - ( 07 Dec 2021 05:00 )  Alb: 3.2 g/dL / Pro: x     / ALK PHOS: x     / ALT: x     / AST: x     / GGT: x             MICROBIOLOGY:  RECENT CULTURES:      RADIOLOGY REVIEWED:  < from: US Kidney and Bladder (12.06.21 @ 12:33) >    EXAM:  US KIDNEYS AND BLADDER      PROCEDURE DATE:  12/06/2021        INTERPRETATION:  CLINICAL INFORMATION: Recurrent UTI    COMPARISON: CT abdomen pelvis 8/30/2021    TECHNIQUE: Sonography of the kidneys and bladder.    FINDINGS:    Right kidney:9.5 cm. No renal mass, hydronephrosis or calculi. Decreased cortical thickness.    Left kidney: 10.2 cm. No renal mass, hydronephrosis or calculi. Decreased cortical thickness.    Urinary bladder: Underdistended and limited.    IMPRESSION:    No hydronephrosis or shadowing stones in either kidney.    --- End of Report ---        < end of copied text >              Assessment:  Patient with depression, anxiety, phobic d/o, uses diaper at home admitted for combative behavior at home , found to have some pyuria but no fever, leukocytosis or gu symptoms. No cultures sent as best I can tell. Had a few days of zosyn and will stop as planned. Renal us without any findings of stones or rentention  Plan:  monitor off antibiotics. 
Patient is a 76y old  Female who presents with a chief complaint of UTI, combativeness (04 Dec 2021 10:26)      Patient seen and examined at bedside.  Denies chest pain, dyspnea, abd pain.  No acute distress.    ALLERGIES:  epinephrine (Unknown)  norepinephrine (Unknown)    MEDICATIONS  (STANDING):  acetaminophen     Tablet .. 975 milliGRAM(s) Oral every 8 hours  bisacodyl 5 milliGRAM(s) Oral at bedtime  DULoxetine 20 milliGRAM(s) Oral two times a day  enoxaparin Injectable 40 milliGRAM(s) SubCutaneous daily  gabapentin 100 milliGRAM(s) Oral three times a day  influenza  Vaccine (HIGH DOSE) 0.7 milliLiter(s) IntraMuscular once  levothyroxine 75 MICROGram(s) Oral daily  melatonin 6 milliGRAM(s) Oral at bedtime  nicotine -  14 mG/24Hr(s) Patch 1 patch Transdermal daily  piperacillin/tazobactam IVPB.. 3.375 Gram(s) IV Intermittent every 8 hours  polyethylene glycol 3350 17 Gram(s) Oral daily  senna 2 Tablet(s) Oral at bedtime  simvastatin 10 milliGRAM(s) Oral at bedtime    MEDICATIONS  (PRN):  ALPRAZolam 0.5 milliGRAM(s) Oral two times a day PRN anxiety  oxyCODONE    IR 5 milliGRAM(s) Oral every 4 hours PRN Mild Pain (1 - 3)  oxyCODONE    IR 10 milliGRAM(s) Oral every 6 hours PRN Severe Pain (7 - 10)    Vital Signs Last 24 Hrs  T(F): 97.3 (05 Dec 2021 08:38), Max: 98.4 (04 Dec 2021 16:47)  HR: 62 (05 Dec 2021 08:38) (62 - 74)  BP: 116/69 (05 Dec 2021 08:38) (116/69 - 129/80)  RR: 17 (05 Dec 2021 08:38) (17 - 18)  SpO2: 95% (05 Dec 2021 08:38) (95% - 99%)  I&O's Summary    BMI (kg/m2): 30.3 (1221 @ 16:47)  PHYSICAL EXAM:  General: NAD, A/O x 3  ENT: MMM  Neck: Supple, No JVD  Lungs: Clear to auscultation bilaterally  Cardio: RRR, S1/S2, No murmurs  Abdomen: Soft, Nontender, Nondistended; Bowel sounds present  Extremities: No calf tenderness, No pitting edema    LABS:                        12.8   8.80  )-----------( 297      ( 05 Dec 2021 05:05 )             40.0           144  |  107  |  14  ----------------------------<  92  3.9   |  29  |  1.22    Ca    9.4      05 Dec 2021 05:05    TPro  6.8  /  Alb  3.1  /  TBili  0.2  /  DBili  x   /  AST  18  /  ALT  21  /  AlkPhos  57       eGFR if Non African American: 44 mL/min/1.73M2 (21 @ 05:05)  eGFR if African American: 50 mL/min/1.73M2 (21 @ 05:05)                               Urinalysis Basic - ( 03 Dec 2021 09:20 )    Color: Yellow / Appearance: very cloudy / S.020 / pH: x  Gluc: x / Ketone: Negative  / Bili: Negative / Urobili: Negative   Blood: x / Protein: 100 / Nitrite: Positive   Leuk Esterase: Moderate / RBC: 0-4 /HPF / WBC >50 /HPF   Sq Epi: x / Non Sq Epi: Neg.-Few / Bacteria: Many /HPF            RADIOLOGY & ADDITIONAL TESTS:    Care Discussed with Consultants/Other Providers:   
Medicine Progress Note    Patient is a 76y old  Female who presents with a chief complaint of UTI, combativeness (07 Dec 2021 07:41)      SUBJECTIVE / OVERNIGHT EVENTS:  seen and examined  Chart reviewed  No overnight events  Limb weakness improving with therapy  BM+  No pain  No complaints    ADDITIONAL REVIEW OF SYSTEMS:  no fever/chills/CP/sob/palpitation/dizziness/ abd pain/nausea/vomiting/diarrhea/constipation/headaches. all other ROS neg    MEDICATIONS  (STANDING):  acetaminophen     Tablet .. 975 milliGRAM(s) Oral every 8 hours  bisacodyl 5 milliGRAM(s) Oral at bedtime  DULoxetine 20 milliGRAM(s) Oral two times a day  enoxaparin Injectable 40 milliGRAM(s) SubCutaneous daily  famotidine    Tablet 20 milliGRAM(s) Oral daily  gabapentin 100 milliGRAM(s) Oral three times a day  influenza  Vaccine (HIGH DOSE) 0.7 milliLiter(s) IntraMuscular once  melatonin 6 milliGRAM(s) Oral at bedtime  nicotine -  14 mG/24Hr(s) Patch 1 patch Transdermal daily  piperacillin/tazobactam IVPB.. 3.375 Gram(s) IV Intermittent every 8 hours  polyethylene glycol 3350 17 Gram(s) Oral daily  senna 2 Tablet(s) Oral at bedtime  simvastatin 10 milliGRAM(s) Oral at bedtime    MEDICATIONS  (PRN):  ALPRAZolam 0.5 milliGRAM(s) Oral two times a day PRN anxiety  oxyCODONE    IR 5 milliGRAM(s) Oral every 4 hours PRN Mild Pain (1 - 3)  oxyCODONE    IR 10 milliGRAM(s) Oral every 6 hours PRN Severe Pain (7 - 10)    CAPILLARY BLOOD GLUCOSE        I&O's Summary    06 Dec 2021 07:01  -  07 Dec 2021 07:00  --------------------------------------------------------  IN: 0 mL / OUT: 650 mL / NET: -650 mL        PHYSICAL EXAM:  Vital Signs Last 24 Hrs  T(C): 36.6 (07 Dec 2021 05:06), Max: 37.4 (06 Dec 2021 19:45)  T(F): 97.8 (07 Dec 2021 05:06), Max: 99.3 (06 Dec 2021 19:45)  HR: 61 (07 Dec 2021 05:06) (61 - 80)  BP: 137/64 (07 Dec 2021 05:06) (131/77 - 152/92)  BP(mean): --  RR: 18 (07 Dec 2021 05:06) (16 - 18)  SpO2: 99% (07 Dec 2021 05:06) (94% - 99%)    GENERAL: Not in distress. Alert    HEENT: clear conjuctiva, MMM. no pallor or icterus  CARDIOVASCULAR: RRR S1, S2. No murmur/rubs/gallop  LUNGS: BLAE+, no rales, no wheezing, no rhonchi.    ABDOMEN: ND. Soft,  NT, no guarding / rebound / rigidity. BS normoactive  BACK: No spine tenderness.  EXTREMITIES: no edema. no leg or calf TP.  SKIN: warm and dry  CNS: AAO*3. grossly intact  PSYCHIATRIC: Calm.  No agitation.    LABS:    12-07    141  |  106  |  12  ----------------------------<  95  3.4<L>   |  25  |  1.07    Ca    9.4      07 Dec 2021 05:00  Phos  4.6     12-07    TPro  x   /  Alb  3.2<L>  /  TBili  x   /  DBili  x   /  AST  x   /  ALT  x   /  AlkPhos  x   12-07              SARS-CoV-2: NotDetec (04 Dec 2021 10:24)  COVID-19 PCR: NotDetec (27 Oct 2021 14:00)  COVID-19 PCR: NotDetec (02 Sep 2021 10:10)  COVID-19 PCR: NotDetec (30 Aug 2021 18:55)  COVID-19 PCR: NotDetec (17 Jun 2021 23:00)      RADIOLOGY & ADDITIONAL TESTS:  Imaging from Last 24 Hours:    Electrocardiogram/QTc Interval:    COORDINATION OF CARE:  Care Discussed with Consultants/Other Providers:  
Medicine Progress Note    Patient is a 76y old  Female who presents with a chief complaint of UTI, combativeness (06 Dec 2021 08:29)      SUBJECTIVE / OVERNIGHT EVENTS:  seen and examined  Chart reviewed  No overnight events  Limb weakness improving with therapy  BM+  reported epigastric discomfort. no dysuria/flank pain. reproted one episode of diarrhoea this am. not confirmed by RN, uses diaper. no other complaints  seen by ID today. renal USG pending    ADDITIONAL REVIEW OF SYSTEMS:  no fever/chills/CP/sob/palpitation/dizziness/ nausea/vomiting/diarrhea/constipation/headaches. all other ROS neg    MEDICATIONS  (STANDING):  acetaminophen     Tablet .. 975 milliGRAM(s) Oral every 8 hours  bisacodyl 5 milliGRAM(s) Oral at bedtime  DULoxetine 20 milliGRAM(s) Oral two times a day  enoxaparin Injectable 40 milliGRAM(s) SubCutaneous daily  famotidine    Tablet 20 milliGRAM(s) Oral daily  gabapentin 100 milliGRAM(s) Oral three times a day  influenza  Vaccine (HIGH DOSE) 0.7 milliLiter(s) IntraMuscular once  levothyroxine 75 MICROGram(s) Oral daily  melatonin 6 milliGRAM(s) Oral at bedtime  nicotine -  14 mG/24Hr(s) Patch 1 patch Transdermal daily  piperacillin/tazobactam IVPB.. 3.375 Gram(s) IV Intermittent every 8 hours  polyethylene glycol 3350 17 Gram(s) Oral daily  senna 2 Tablet(s) Oral at bedtime  simvastatin 10 milliGRAM(s) Oral at bedtime    MEDICATIONS  (PRN):  ALPRAZolam 0.5 milliGRAM(s) Oral two times a day PRN anxiety  oxyCODONE    IR 5 milliGRAM(s) Oral every 4 hours PRN Mild Pain (1 - 3)  oxyCODONE    IR 10 milliGRAM(s) Oral every 6 hours PRN Severe Pain (7 - 10)    CAPILLARY BLOOD GLUCOSE        I&O's Summary      PHYSICAL EXAM:  Vital Signs Last 24 Hrs  T(C): 36.9 (06 Dec 2021 08:00), Max: 36.9 (06 Dec 2021 08:00)  T(F): 98.5 (06 Dec 2021 08:00), Max: 98.5 (06 Dec 2021 08:00)  HR: 67 (06 Dec 2021 08:00) (64 - 68)  BP: 131/75 (06 Dec 2021 08:00) (118/59 - 138/79)  BP(mean): --  RR: 14 (06 Dec 2021 08:00) (14 - 19)  SpO2: 98% (06 Dec 2021 08:00) (95% - 99%)    GENERAL: Not in distress. Alert    HEENT: AT/NC. clear conjuctiva, MMM.   no pallor or icterus  CARDIOVASCULAR: RRR S1, S2. No murmur/rubs/gallop  LUNGS: BLAE+, no rales, no wheezing, no rhonchi.    ABDOMEN: ND. Soft,  NT, no guarding / rebound / rigidity. BS normoactive. No CVA tenderness.    BACK: No spine tenderness.  EXTREMITIES: no edema. no leg or calf TP.  SKIN: no rash.   NEUROLOGIC: AAO*2.strength is symmetric, sensation intact, speech fluent.    PSYCHIATRIC: Calm.  No agitation. flat affect. looks disinterested in conversation      LABS:                        12.8   8.80  )-----------( 297      ( 05 Dec 2021 05:05 )             40.0     12-05    144  |  107  |  14  ----------------------------<  92  3.9   |  29  |  1.22    Ca    9.4      05 Dec 2021 05:05    TPro  6.8  /  Alb  3.1<L>  /  TBili  0.2  /  DBili  x   /  AST  18  /  ALT  21  /  AlkPhos  57  12-05              SARS-CoV-2: NotDetec (04 Dec 2021 10:24)  COVID-19 PCR: NotDetec (27 Oct 2021 14:00)  COVID-19 PCR: NotDetec (02 Sep 2021 10:10)  COVID-19 PCR: NotDetec (30 Aug 2021 18:55)  COVID-19 PCR: NotDetec (17 Jun 2021 23:00)      RADIOLOGY & ADDITIONAL TESTS:  Imaging from Last 24 Hours:    Electrocardiogram/QTc Interval:    COORDINATION OF CARE:  Care Discussed with Consultants/Other Providers:

## 2021-12-07 NOTE — DISCHARGE NOTE NURSING/CASE MANAGEMENT/SOCIAL WORK - NSDCFUADDAPPT_GEN_ALL_CORE_FT
Follow up with Dr Reyes 12/21 at 3PM in [facility]   Follow up with Dr Reyes 12/21 at 3PM in [place] office    Appointment schedule for [facility] with Dr. Saavedra 12/20/21 at 3:00 PM.

## 2021-12-07 NOTE — DISCHARGE NOTE PROVIDER - CARE PROVIDER_API CALL
Reyes, John A (MD)  Internal Medicine  [address], [address]  [place] [address]  Phone: [phone]  Fax: [phone]  Follow Up Time:     Provide national domestic hotline contact: [phone] (3413),   Phone: (   )    -  Fax: (   )    -  Follow Up Time:     [facility],   Phone: [phone]  Fax: (   )    -  Follow Up Time:

## 2021-12-07 NOTE — DISCHARGE NOTE PROVIDER - NSDCMRMEDTOKEN_GEN_ALL_CORE_FT
ALPRAZolam 0.5 mg oral tablet: 1 tab(s) orally 2 times a day, As Needed  Cymbalta 20 mg oral delayed release capsule: 1 cap(s) orally 2 times a day  famotidine 20 mg oral tablet: 1 tab(s) orally once a day  gabapentin 100 mg oral capsule: 1 cap(s) orally 3 times a day  melatonin 3 mg oral tablet: 2 tab(s) orally once a day (at bedtime)  nicotine 14 mg/24 hr transdermal film, extended release: 1 patch transdermal once a day  oxycodone-acetaminophen 5 mg-325 mg oral tablet: 1 tab(s) orally 4 times a day  polyethylene glycol 3350 oral powder for reconstitution: 17 gram(s) orally once a day as needed for contipation  senna oral tablet: 2 tab(s) orally once a day (at bedtime) as needed for constipation  simvastatin 10 mg oral tablet: 1 tab(s) orally once a day (at bedtime)  Synthroid 75 mcg (0.075 mg) oral tablet: 1 tab(s) orally once a day  Vitamin D2 1.25 mg (50,000 intl units) oral capsule: 1 cap(s) orally once a week   ALPRAZolam 0.5 mg oral tablet: 1 tab(s) orally 2 times a day, As Needed  Cymbalta 20 mg oral delayed release capsule: 1 cap(s) orally 2 times a day  famotidine 20 mg oral tablet: 1 tab(s) orally once a day  gabapentin 100 mg oral capsule: 1 cap(s) orally 3 times a day  levothyroxine 50 mcg (0.05 mg) oral tablet: 1 tab(s) orally once a day  melatonin 3 mg oral tablet: 2 tab(s) orally once a day (at bedtime)  nicotine 14 mg/24 hr transdermal film, extended release: 1 patch transdermal once a day  oxycodone-acetaminophen 5 mg-325 mg oral tablet: 1 tab(s) orally 4 times a day  polyethylene glycol 3350 oral powder for reconstitution: 17 gram(s) orally once a day as needed for contipation  senna oral tablet: 2 tab(s) orally once a day (at bedtime) as needed for constipation  simvastatin 10 mg oral tablet: 1 tab(s) orally once a day (at bedtime)  Vitamin D2 1.25 mg (50,000 intl units) oral capsule: 1 cap(s) orally once a week

## 2021-12-07 NOTE — DISCHARGE NOTE PROVIDER - PROVIDER TOKENS
PROVIDER:[TOKEN:[[id number]]],FREE:[LAST:[Provide national domestic hotline contact: [phone].[phone] (1935)],PHONE:[(   )    -],FAX:[(   )    -]],FREE:[LAST:[[facility] psych],PHONE:[phone]],FAX:[(   )    -]]
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
## 2021-12-07 NOTE — DISCHARGE NOTE PROVIDER - NSDCFUADDINST_GEN_ALL_CORE_FT
please bring all DC papers and medications to all health care providers. Return to ER if symptoms recur and any new concerning symptoms.

## 2021-12-07 NOTE — DISCHARGE NOTE NURSING/CASE MANAGEMENT/SOCIAL WORK - NSDCPEFALRISK_GEN_ALL_CORE
For information on Fall & Injury Prevention, visit: [email or web address] OR  [email or web address] OR  https://www.cdc.gov/steadi/patient.html
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
## 2021-12-21 ENCOUNTER — APPOINTMENT (OUTPATIENT)
Dept: INTERNAL MEDICINE | Facility: CLINIC | Age: 76
End: 2021-12-21
Payer: MEDICARE

## 2021-12-21 VITALS
RESPIRATION RATE: 12 BRPM | OXYGEN SATURATION: 97 % | SYSTOLIC BLOOD PRESSURE: 120 MMHG | DIASTOLIC BLOOD PRESSURE: 74 MMHG | TEMPERATURE: 97.5 F | HEART RATE: 84 BPM | HEIGHT: 60 IN

## 2021-12-21 DIAGNOSIS — R29.6 REPEATED FALLS: ICD-10-CM

## 2021-12-21 DIAGNOSIS — M19.90 UNSPECIFIED OSTEOARTHRITIS, UNSPECIFIED SITE: ICD-10-CM

## 2021-12-21 DIAGNOSIS — F34.9 PERSISTENT MOOD [AFFECTIVE] DISORDER, UNSPECIFIED: ICD-10-CM

## 2021-12-21 DIAGNOSIS — N39.0 URINARY TRACT INFECTION, SITE NOT SPECIFIED: ICD-10-CM

## 2021-12-21 DIAGNOSIS — E03.9 HYPOTHYROIDISM, UNSPECIFIED: ICD-10-CM

## 2021-12-21 DIAGNOSIS — Z09 ENCOUNTER FOR FOLLOW-UP EXAMINATION AFTER COMPLETED TREATMENT FOR CONDITIONS OTHER THAN MALIGNANT NEOPLASM: ICD-10-CM

## 2021-12-21 PROCEDURE — 99495 TRANSJ CARE MGMT MOD F2F 14D: CPT

## 2021-12-21 RX ORDER — LEVOTHYROXINE SODIUM 0.07 MG/1
75 TABLET ORAL DAILY
Qty: 90 | Refills: 3 | Status: DISCONTINUED | COMMUNITY
Start: 2021-10-07 | End: 2021-12-21

## 2021-12-21 RX ORDER — QUETIAPINE 150 MG/1
150 TABLET, FILM COATED, EXTENDED RELEASE ORAL
Qty: 30 | Refills: 5 | Status: DISCONTINUED | COMMUNITY
Start: 2021-12-14 | End: 2021-12-21
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
## 2021-12-22 PROCEDURE — 81001 URINALYSIS AUTO W/SCOPE: CPT

## 2021-12-22 PROCEDURE — 0225U NFCT DS DNA&RNA 21 SARSCOV2: CPT

## 2021-12-22 PROCEDURE — 80069 RENAL FUNCTION PANEL: CPT

## 2021-12-22 PROCEDURE — 36415 COLL VENOUS BLD VENIPUNCTURE: CPT

## 2021-12-22 PROCEDURE — 76770 US EXAM ABDO BACK WALL COMP: CPT

## 2021-12-22 PROCEDURE — 97166 OT EVAL MOD COMPLEX 45 MIN: CPT

## 2021-12-22 PROCEDURE — U0005: CPT

## 2021-12-22 PROCEDURE — 80053 COMPREHEN METABOLIC PANEL: CPT

## 2021-12-22 PROCEDURE — 85027 COMPLETE CBC AUTOMATED: CPT

## 2021-12-22 PROCEDURE — U0003: CPT

## 2021-12-22 PROCEDURE — 82746 ASSAY OF FOLIC ACID SERUM: CPT

## 2021-12-22 PROCEDURE — 93005 ELECTROCARDIOGRAM TRACING: CPT

## 2021-12-22 PROCEDURE — 80307 DRUG TEST PRSMV CHEM ANLYZR: CPT

## 2021-12-22 PROCEDURE — 82607 VITAMIN B-12: CPT

## 2021-12-22 PROCEDURE — 97162 PT EVAL MOD COMPLEX 30 MIN: CPT

## 2021-12-22 PROCEDURE — 85025 COMPLETE CBC W/AUTO DIFF WBC: CPT

## 2021-12-22 PROCEDURE — 99285 EMERGENCY DEPT VISIT HI MDM: CPT

## 2021-12-22 PROCEDURE — 71045 X-RAY EXAM CHEST 1 VIEW: CPT

## 2021-12-22 PROCEDURE — 84443 ASSAY THYROID STIM HORMONE: CPT

## 2022-01-18 ENCOUNTER — OUTPATIENT (OUTPATIENT)
Dept: OUTPATIENT SERVICES | Facility: HOSPITAL | Age: 77
LOS: 1 days | Discharge: HOME | End: 2022-01-18
Payer: MEDICARE

## 2022-01-18 PROCEDURE — 90792 PSYCH DIAG EVAL W/MED SRVCS: CPT | Mod: 95

## 2022-01-19 DIAGNOSIS — F43.10 POST-TRAUMATIC STRESS DISORDER, UNSPECIFIED: ICD-10-CM

## 2022-01-19 DIAGNOSIS — F33.1 MAJOR DEPRESSIVE DISORDER, RECURRENT, MODERATE: ICD-10-CM

## 2022-02-02 ENCOUNTER — INPATIENT (INPATIENT)
Facility: HOSPITAL | Age: 77
LOS: 2 days | Discharge: HOME | DRG: 689 | End: 2022-02-05
Attending: STUDENT IN AN ORGANIZED HEALTH CARE EDUCATION/TRAINING PROGRAM | Admitting: INTERNAL MEDICINE
Payer: MEDICARE

## 2022-02-02 VITALS
DIASTOLIC BLOOD PRESSURE: 80 MMHG | SYSTOLIC BLOOD PRESSURE: 134 MMHG | RESPIRATION RATE: 16 BRPM | OXYGEN SATURATION: 96 % | HEART RATE: 92 BPM | TEMPERATURE: 97 F | HEIGHT: 60 IN | WEIGHT: 229.94 LBS

## 2022-02-02 DIAGNOSIS — Z98.891 HISTORY OF UTERINE SCAR FROM PREVIOUS SURGERY: Chronic | ICD-10-CM

## 2022-02-02 DIAGNOSIS — N39.0 URINARY TRACT INFECTION, SITE NOT SPECIFIED: ICD-10-CM

## 2022-02-02 LAB
ALBUMIN SERPL ELPH-MCNC: 3.3 G/DL — SIGNIFICANT CHANGE UP (ref 3.3–5)
ALP SERPL-CCNC: 75 U/L — SIGNIFICANT CHANGE UP (ref 40–120)
ALT FLD-CCNC: 37 U/L — SIGNIFICANT CHANGE UP (ref 10–45)
ANION GAP SERPL CALC-SCNC: 10 MMOL/L — SIGNIFICANT CHANGE UP (ref 5–17)
APPEARANCE UR: CLEAR — SIGNIFICANT CHANGE UP
AST SERPL-CCNC: 17 U/L — SIGNIFICANT CHANGE UP (ref 10–40)
BACTERIA # UR AUTO: ABNORMAL /HPF
BASOPHILS # BLD AUTO: 0.08 K/UL — SIGNIFICANT CHANGE UP (ref 0–0.2)
BASOPHILS NFR BLD AUTO: 0.6 % — SIGNIFICANT CHANGE UP (ref 0–2)
BILIRUB SERPL-MCNC: 0.3 MG/DL — SIGNIFICANT CHANGE UP (ref 0.2–1.2)
BILIRUB UR-MCNC: NEGATIVE — SIGNIFICANT CHANGE UP
BUN SERPL-MCNC: 31 MG/DL — HIGH (ref 7–23)
CALCIUM SERPL-MCNC: 9.3 MG/DL — SIGNIFICANT CHANGE UP (ref 8.4–10.5)
CHLORIDE SERPL-SCNC: 106 MMOL/L — SIGNIFICANT CHANGE UP (ref 96–108)
CO2 SERPL-SCNC: 28 MMOL/L — SIGNIFICANT CHANGE UP (ref 22–31)
COLOR SPEC: YELLOW — SIGNIFICANT CHANGE UP
CREAT SERPL-MCNC: 0.86 MG/DL — SIGNIFICANT CHANGE UP (ref 0.5–1.3)
DIFF PNL FLD: NEGATIVE — SIGNIFICANT CHANGE UP
EOSINOPHIL # BLD AUTO: 0.13 K/UL — SIGNIFICANT CHANGE UP (ref 0–0.5)
EOSINOPHIL NFR BLD AUTO: 0.9 % — SIGNIFICANT CHANGE UP (ref 0–6)
EPI CELLS # UR: SIGNIFICANT CHANGE UP
GLUCOSE SERPL-MCNC: 76 MG/DL — SIGNIFICANT CHANGE UP (ref 70–99)
GLUCOSE UR QL: NEGATIVE — SIGNIFICANT CHANGE UP
HCT VFR BLD CALC: 43.4 % — SIGNIFICANT CHANGE UP (ref 34.5–45)
HGB BLD-MCNC: 13.1 G/DL — SIGNIFICANT CHANGE UP (ref 11.5–15.5)
IMM GRANULOCYTES NFR BLD AUTO: 1.1 % — SIGNIFICANT CHANGE UP (ref 0–1.5)
KETONES UR-MCNC: NEGATIVE — SIGNIFICANT CHANGE UP
LEUKOCYTE ESTERASE UR-ACNC: ABNORMAL
LYMPHOCYTES # BLD AUTO: 27.5 % — SIGNIFICANT CHANGE UP (ref 13–44)
LYMPHOCYTES # BLD AUTO: 3.85 K/UL — HIGH (ref 1–3.3)
MCHC RBC-ENTMCNC: 28.5 PG — SIGNIFICANT CHANGE UP (ref 27–34)
MCHC RBC-ENTMCNC: 30.2 GM/DL — LOW (ref 32–36)
MCV RBC AUTO: 94.6 FL — SIGNIFICANT CHANGE UP (ref 80–100)
MONOCYTES # BLD AUTO: 1.03 K/UL — HIGH (ref 0–0.9)
MONOCYTES NFR BLD AUTO: 7.3 % — SIGNIFICANT CHANGE UP (ref 2–14)
NEUTROPHILS # BLD AUTO: 8.78 K/UL — HIGH (ref 1.8–7.4)
NEUTROPHILS NFR BLD AUTO: 62.6 % — SIGNIFICANT CHANGE UP (ref 43–77)
NITRITE UR-MCNC: POSITIVE
NRBC # BLD: 0 /100 WBCS — SIGNIFICANT CHANGE UP (ref 0–0)
PH UR: 6 — SIGNIFICANT CHANGE UP (ref 5–8)
PLATELET # BLD AUTO: 252 K/UL — SIGNIFICANT CHANGE UP (ref 150–400)
POTASSIUM SERPL-MCNC: 3.8 MMOL/L — SIGNIFICANT CHANGE UP (ref 3.5–5.3)
POTASSIUM SERPL-SCNC: 3.8 MMOL/L — SIGNIFICANT CHANGE UP (ref 3.5–5.3)
PROT SERPL-MCNC: 7.6 G/DL — SIGNIFICANT CHANGE UP (ref 6–8.3)
PROT UR-MCNC: 30 MG/DL
RBC # BLD: 4.59 M/UL — SIGNIFICANT CHANGE UP (ref 3.8–5.2)
RBC # FLD: 16.4 % — HIGH (ref 10.3–14.5)
RBC CASTS # UR COMP ASSIST: SIGNIFICANT CHANGE UP /HPF (ref 0–4)
SARS-COV-2 RNA SPEC QL NAA+PROBE: SIGNIFICANT CHANGE UP
SODIUM SERPL-SCNC: 144 MMOL/L — SIGNIFICANT CHANGE UP (ref 135–145)
SP GR SPEC: 1.01 — SIGNIFICANT CHANGE UP (ref 1.01–1.02)
UROBILINOGEN FLD QL: NEGATIVE — SIGNIFICANT CHANGE UP
WBC # BLD: 14.02 K/UL — HIGH (ref 3.8–10.5)
WBC # FLD AUTO: 14.02 K/UL — HIGH (ref 3.8–10.5)
WBC UR QL: ABNORMAL /HPF (ref 0–5)

## 2022-02-02 PROCEDURE — 93010 ELECTROCARDIOGRAM REPORT: CPT

## 2022-02-02 PROCEDURE — 71045 X-RAY EXAM CHEST 1 VIEW: CPT | Mod: 26

## 2022-02-02 PROCEDURE — 99223 1ST HOSP IP/OBS HIGH 75: CPT

## 2022-02-02 PROCEDURE — 99285 EMERGENCY DEPT VISIT HI MDM: CPT

## 2022-02-02 PROCEDURE — 70450 CT HEAD/BRAIN W/O DYE: CPT | Mod: 26,MA

## 2022-02-02 RX ORDER — ENOXAPARIN SODIUM 100 MG/ML
40 INJECTION SUBCUTANEOUS EVERY 12 HOURS
Refills: 0 | Status: DISCONTINUED | OUTPATIENT
Start: 2022-02-02 | End: 2022-02-05

## 2022-02-02 RX ORDER — SIMVASTATIN 20 MG/1
10 TABLET, FILM COATED ORAL AT BEDTIME
Refills: 0 | Status: DISCONTINUED | OUTPATIENT
Start: 2022-02-02 | End: 2022-02-05

## 2022-02-02 RX ORDER — POLYETHYLENE GLYCOL 3350 17 G/17G
17 POWDER, FOR SOLUTION ORAL DAILY
Refills: 0 | Status: DISCONTINUED | OUTPATIENT
Start: 2022-02-02 | End: 2022-02-05

## 2022-02-02 RX ORDER — SENNA PLUS 8.6 MG/1
2 TABLET ORAL AT BEDTIME
Refills: 0 | Status: DISCONTINUED | OUTPATIENT
Start: 2022-02-02 | End: 2022-02-05

## 2022-02-02 RX ORDER — ALPRAZOLAM 0.25 MG
0.5 TABLET ORAL
Refills: 0 | Status: DISCONTINUED | OUTPATIENT
Start: 2022-02-02 | End: 2022-02-05

## 2022-02-02 RX ORDER — CEPHALEXIN 500 MG
500 CAPSULE ORAL ONCE
Refills: 0 | Status: COMPLETED | OUTPATIENT
Start: 2022-02-02 | End: 2022-02-02

## 2022-02-02 RX ORDER — PANTOPRAZOLE SODIUM 20 MG/1
40 TABLET, DELAYED RELEASE ORAL
Refills: 0 | Status: DISCONTINUED | OUTPATIENT
Start: 2022-02-02 | End: 2022-02-05

## 2022-02-02 RX ORDER — OXYCODONE AND ACETAMINOPHEN 5; 325 MG/1; MG/1
1 TABLET ORAL EVERY 8 HOURS
Refills: 0 | Status: DISCONTINUED | OUTPATIENT
Start: 2022-02-02 | End: 2022-02-05

## 2022-02-02 RX ORDER — GABAPENTIN 400 MG/1
100 CAPSULE ORAL THREE TIMES A DAY
Refills: 0 | Status: DISCONTINUED | OUTPATIENT
Start: 2022-02-02 | End: 2022-02-05

## 2022-02-02 RX ORDER — ALPRAZOLAM 0.25 MG
0.25 TABLET ORAL ONCE
Refills: 0 | Status: DISCONTINUED | OUTPATIENT
Start: 2022-02-02 | End: 2022-02-02

## 2022-02-02 RX ORDER — OXYCODONE AND ACETAMINOPHEN 5; 325 MG/1; MG/1
1 TABLET ORAL ONCE
Refills: 0 | Status: DISCONTINUED | OUTPATIENT
Start: 2022-02-02 | End: 2022-02-02

## 2022-02-02 RX ORDER — CEPHALEXIN 500 MG
1 CAPSULE ORAL
Qty: 14 | Refills: 0
Start: 2022-02-02 | End: 2022-02-08

## 2022-02-02 RX ORDER — LANOLIN ALCOHOL/MO/W.PET/CERES
3 CREAM (GRAM) TOPICAL AT BEDTIME
Refills: 0 | Status: DISCONTINUED | OUTPATIENT
Start: 2022-02-02 | End: 2022-02-05

## 2022-02-02 RX ORDER — CEFTRIAXONE 500 MG/1
1000 INJECTION, POWDER, FOR SOLUTION INTRAMUSCULAR; INTRAVENOUS EVERY 24 HOURS
Refills: 0 | Status: DISCONTINUED | OUTPATIENT
Start: 2022-02-02 | End: 2022-02-04

## 2022-02-02 RX ORDER — DULOXETINE HYDROCHLORIDE 30 MG/1
20 CAPSULE, DELAYED RELEASE ORAL
Refills: 0 | Status: DISCONTINUED | OUTPATIENT
Start: 2022-02-02 | End: 2022-02-05

## 2022-02-02 RX ORDER — LEVOTHYROXINE SODIUM 125 MCG
50 TABLET ORAL DAILY
Refills: 0 | Status: DISCONTINUED | OUTPATIENT
Start: 2022-02-02 | End: 2022-02-05

## 2022-02-02 RX ADMIN — ENOXAPARIN SODIUM 40 MILLIGRAM(S): 100 INJECTION SUBCUTANEOUS at 23:23

## 2022-02-02 RX ADMIN — DULOXETINE HYDROCHLORIDE 20 MILLIGRAM(S): 30 CAPSULE, DELAYED RELEASE ORAL at 23:15

## 2022-02-02 RX ADMIN — OXYCODONE AND ACETAMINOPHEN 1 TABLET(S): 5; 325 TABLET ORAL at 22:38

## 2022-02-02 RX ADMIN — POLYETHYLENE GLYCOL 3350 17 GRAM(S): 17 POWDER, FOR SOLUTION ORAL at 22:47

## 2022-02-02 RX ADMIN — GABAPENTIN 100 MILLIGRAM(S): 400 CAPSULE ORAL at 22:38

## 2022-02-02 RX ADMIN — Medication 0.25 MILLIGRAM(S): at 19:43

## 2022-02-02 RX ADMIN — SENNA PLUS 2 TABLET(S): 8.6 TABLET ORAL at 23:15

## 2022-02-02 RX ADMIN — OXYCODONE AND ACETAMINOPHEN 1 TABLET(S): 5; 325 TABLET ORAL at 17:59

## 2022-02-02 RX ADMIN — Medication 0.5 MILLIGRAM(S): at 22:38

## 2022-02-02 RX ADMIN — Medication 500 MILLIGRAM(S): at 19:27

## 2022-02-02 RX ADMIN — SIMVASTATIN 10 MILLIGRAM(S): 20 TABLET, FILM COATED ORAL at 23:14

## 2022-02-02 RX ADMIN — CEFTRIAXONE 100 MILLIGRAM(S): 500 INJECTION, POWDER, FOR SOLUTION INTRAMUSCULAR; INTRAVENOUS at 23:18

## 2022-02-02 RX ADMIN — OXYCODONE AND ACETAMINOPHEN 1 TABLET(S): 5; 325 TABLET ORAL at 23:12
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
## 2022-02-02 NOTE — PATIENT PROFILE ADULT - FALL HARM RISK - RISK INTERVENTIONS

## 2022-02-02 NOTE — H&P ADULT - NSHPREVIEWOFSYSTEMS_GEN_ALL_CORE

## 2022-02-02 NOTE — H&P ADULT - NSHPSOCIALHISTORY_GEN_ALL_CORE
Sochx: was a smoker and would like to continue to smoke but has no access, no ETOH or illicit drug use. Has daily HHA except Sundays
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
## 2022-02-02 NOTE — H&P ADULT - NSHPPHYSICALEXAM_GEN_ALL_CORE
Vital Signs Last 24 Hrs  T(C): 36.8 (02 Feb 2022 19:45), Max: 36.8 (02 Feb 2022 19:45)  T(F): 98.2 (02 Feb 2022 19:45), Max: 98.2 (02 Feb 2022 19:45)  HR: 87 (02 Feb 2022 19:45) (87 - 92)  BP: 135/81 (02 Feb 2022 19:45) (134/80 - 135/81)  BP(mean): --  RR: 17 (02 Feb 2022 19:45) (16 - 17)  SpO2: 97% (02 Feb 2022 19:45) (96% - 97%)  Daily Height in cm: 152.4 (02 Feb 2022 14:53)    Daily   CAPILLARY BLOOD GLUCOSE        I&O's Summary      GENERAL: NAD  HEAD:  Normocephalic  EYES: EOMI, PERRLA, conjunctiva and sclera clear  ENMT: No tonsillar erythema, exudates, or enlargement; Moist mucous membranes, No lesions  NECK: Supple, No JVD, no bruit, normal thyroid  NERVOUS SYSTEM:  Alert & Oriented X3, Good concentration; grossly  Motor Strength 5/5 B/L upper and lower extremities; DTRs 2+ intact and symmetric  CHEST/LUNG: Clear to auscultation bilaterally; No rales, rhonchi, wheezing, or rubs  HEART: Regular rate and rhythm; No murmurs, rubs, or gallops  ABDOMEN: Soft, Nontender, Nondistended; Bowel sounds present  EXTREMITIES:  2+ Peripheral Pulses, No clubbing, cyanosis, or edema  LYMPH: No lymphadenopathy noted  SKIN: No rashes or lesions.
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
## 2022-02-02 NOTE — ED PROVIDER NOTE - NSFOLLOWUPINSTRUCTIONS_ED_ALL_ED_FT
Follow up with your primary physician for  a post hospital visit within 48 hours, taking all results form the ER to be reviewed.  Complete the keflex 500 mg 1 tab twice a day for 7 dyas for the urine infection. You can recheck  a urine with your primary physician once mediation completed to make sure the infection is gone.  If any fevers, chills, nausea, vomiting, any worsening, concerning, or new signs or symptoms return to the ER.
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
## 2022-02-02 NOTE — H&P ADULT - NSHPLABSRESULTS_GEN_ALL_CORE
13.1   14. )-----------( 252      ( 2022 16:30 )             43.4       02-    144  |  106  |  31<H>  ----------------------------<  76  3.8   |  28  |  0.86    Ca    9.3      2022 16:30    TPro  7.6  /  Alb  3.3  /  TBili  0.3  /  DBili  x   /  AST  17  /  ALT  37  /  AlkPhos  75  02-02         LIVER FUNCTIONS - ( 2022 16:30 )  Alb: 3.3 g/dL / Pro: 7.6 g/dL / ALK PHOS: 75 U/L / ALT: 37 U/L / AST: 17 U/L / GGT: x                         Urinalysis Basic - ( 2022 17:50 )    Color: Yellow / Appearance: Clear / S.015 / pH: x  Gluc: x / Ketone: Negative  / Bili: Negative / Urobili: Negative   Blood: x / Protein: 30 mg/dL / Nitrite: Positive   Leuk Esterase: Moderate / RBC: 0-4 /HPF / WBC 26-50 /HPF   Sq Epi: x / Non Sq Epi: Neg.-Few / Bacteria: Many /HPF        EKG: PENDING      CXR: wet read. personally rev. linear atel L base. NAPD    ra< from: CT Head No Cont (22 @ 17:24) >    IMPRESSION:    HEAD CT: Moderate volume loss, microvascular disease, no acute hemorrhage   or midline shift.  Ventriculomegaly is slightly out of proportion to the level of atrophy.   This is not significantly changed compared to 2021. Consider NPH.    < end of copied text >
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
## 2022-02-02 NOTE — H&P ADULT - REASON FOR ADMISSION
What Type Of Note Output Would You Prefer (Optional)?: Standard Output How Severe Is Your Rash?: mild Is This A New Presentation, Or A Follow-Up?: Rash UTI, placement

## 2022-02-02 NOTE — ED PROVIDER NOTE - CARE PLAN
Principal Discharge DX:	Evaluation by medical service required   1 Principal Discharge DX:	Acute UTI

## 2022-02-02 NOTE — ED PROVIDER NOTE - PROGRESS NOTE DETAILS
Pt cooperative throughout visit. Ate lunch and dinner while in the ED.  Called pcp to discuss case,  no call back.  In addition called  with no answer.   Pt with UTI, otherwise no acute findings.  Will prescribe keflex and dc home. Pt feels safe going home.  refusing to let patient back in to house., she does not have her own key with her. no aid in place at home and  refusing to manage her care. Will admit at this time.

## 2022-02-02 NOTE — PROGRESS NOTE ADULT - SUBJECTIVE AND OBJECTIVE BOX
F/U Note:    76y Female admitted with AMS/aggression 2/2 UTI        Vital Signs Last 24 Hrs  T(C): 36.7 (02 Feb 2022 21:34), Max: 36.8 (02 Feb 2022 19:45)  T(F): 98.1 (02 Feb 2022 21:34), Max: 98.2 (02 Feb 2022 19:45)  HR: 86 (02 Feb 2022 21:34) (86 - 92)  BP: 143/76 (02 Feb 2022 21:34) (134/80 - 143/76)  RR: 11 (02 Feb 2022 21:34) (11 - 17)  SpO2: 96% (02 Feb 2022 21:34) (96% - 97%)                            13.1   14.02 )-----------( 252      ( 02 Feb 2022 16:30 )             43.4         02-02    144  |  106  |  31<H>  ----------------------------<  76  3.8   |  28  |  0.86    Ca    9.3      02 Feb 2022 16:30    TPro  7.6  /  Alb  3.3  /  TBili  0.3  /  DBili  x   /  AST  17  /  ALT  37  /  AlkPhos  75  02-02        NEURO: no headaches, blurry vision, tremors, depression, anxity  CV: no chest pain, palpitations, murmurs, orthopnea  Resp: no shortness of breath, cough, wheeze, sputum production  GI: no stomach pain,nausea, vomitting, flatulence, hematemesis, hematochezia  PV: no swelling of extremities, no hair loss, no coolness to extremities  ENDO: no polydypsia, polyphagia, polyuria, weight loss, night sweats          NEURO: awake and alert  CV: (+) S1/S2, rrr, no mrg  RESP: CTA b/l  GI: soft, non tender

## 2022-02-02 NOTE — PATIENT PROFILE ADULT - BRAND OF COVID-19 VACCINATION
"    ----------------------------------------------------------------------------------------------------------  [facility], [place]   Psychiatric Progress Note  Hospital Day #12     Interim History:   The patient's care was discussed with the treatment team and chart notes were reviewed.    Sleep: 3.75 hrs  Scheduled Medications: Received.  PRN Medications: Hydroxyzine x1, trazodone x1, acetaminophen x1.  Staff Report: Continues to have some episodes of sobbing, with last episode triggered by phone call with her mother during which she heard her young daughter cry. Again cried so severely she vomited. There were fewer episodes of this crying overall this weeekend. Expressing concern that father is controlling her. Had difficulty with falling asleep and sleep maintenance last night. Awoke with concern for tongue discomfort which resolved after tooth brushing and acetaminophen dose. Was given trazodone to help return to sleep without improvement. Participated in OT. Expressing desires to leave the hospital and return to work.     Patient Interview: Ms. Davis was interviewed in the group therapy room. She is feeling \"good.\" Slept well overnight. Attended court this morning and feels that it went well. Is concerned that her \"mind is sick\" and is appreciative of treatment. Would like to leave the hospital to continue her education. Plans to call her father later today. Looking forward to her mom's arrival to the [place] on Wednesday. Would like to listen to music more but has trouble getting the stations to work on the headphones offered here. No leg or neck pain currently. No dizziness.     The risks, benefits, alternatives and side effects of any medication changes have been discussed and are understood by the patient and other caregivers.    Review of Systems:   See HPI for pertinent ROS.          Allergies:     Allergies   Allergen Reactions     Septra [Sulfamethoxazole W/Trimethoprim]  " "           Psychiatric Examination:   /78 (BP Location: Right arm)  Pulse 127  Temp 98.5  F (36.9  C) (Oral)  Resp 16  Ht 1.702 m (5' 7\")  Wt 58.7 kg (129 lb 8 oz)  SpO2 100%  BMI 20.28 kg/m2  Weight is 129 lbs 8 oz  Body mass index is 20.28 kg/(m^2).    Appearance: Awake, alert. Well-groomed. Wearing hospital scrubs.  Attitude: Calm. Polite. Smiles occasionally.  Eye Contact: Staring.  Mood: \"Good.\"  Affect: Constricted with modest lability.  Speech: Intermittently delayed speech latency. Sometimes speaks in full sentences but often responds with short phrases.  Psychomotor Behavior: No evidence of tardive dyskinesia, dystonia, tics or stereotypic movements.   Thought Process: Logical and linear thought process.  Associations: loose association.  Thought Content: Appropriate during interview. Staff report ongoing paranoid ideation about father.  Insight: Fair.  Judgment: Poor.  Attention Span and Concentration: Limited.  Recent and Remote Memory: Difficult to assess.  Language: Speaking fluent English.  Fund of Knowledge: Difficult to assess.  Muscle Strength and Tone: Appearance is grossly normal.  Gait and Station: Normal.     Labs:   No new results.     Assessment    Diagnostic Impression:  Ms. Davis is a 26 y.o. F with a psychiatric history of MDD with psychotic features diagnosed during hospitalization at [facility] 4/29-5/31/2017 who presented to the [facility] ED via EMS on 11/21/2018 due to psychosis in the setting of medication non-adherence. Her primary presentation is psychosis, as evidenced by reported and witnessed visual and auditory hallucinations including command hallucinations taking the form of her father, delusions that her father is trying to poison her, stereotypic hand movements, writhing leg movements, echopraxia, and delayed speech latency. Demonstrates emotional lability from calm baseline, with rapid transition to and from sobbing or dancing/singing/laughing. " Sleep maintenance is poor but improving since admission. Unclear if sleep difficulty is attributable to paranoid thinking preventing her from adequate rest vs. zeb. Given her prior diagnosis of MDD with psychosis and her current presentation with acute psychosis in the absence of depressive sxs, a thought disorder seems to be the most likely pathology. Ddx includes schizoaffective disorder and less likely bipolar depression or MDD with psychotic features. Current symptoms, along with improvement with lorazepam suggest component of catatonia to her current unspecified condition.     Hospital Course: Ms. Davis was admitted to [facility] on a 72 hour hold. Treatment team filed for commitment and [place]. First commitment hearing completed 12/3/18, second is scheduled 12/6/18. Olanzapine was started and uptitrated. PRN hydroxyzine and trazodone were initiated. Concern for catatonia with poor olanzapine response. Grier Anjel score 15 pre-lorazepam, 11 post-lorazepam, and 3 after 1 day of lorazepam, with overall improvement in clinical appearance. Scheduled lorazepam started. Not in full remission with olanzapine so plan transition to aripiprazole, which she has previously taken with good reported response.      Medical Course: CMP, lipid panel, TSH, B12/folate, CBC, sed rate, lyme, treponema, HIV, EMERALD, ceruloplasmin, ECG WNL. Vitamin D low. UA non-infectious. Demonstrated orthostasis 11/29/18 attributable to up-titration of olanzapine.    Plan   Principal Diagnosis:   # Psychosis, Unspecified  # R/O Catatonia    Psychotropic Medications:  Modify:  - Increase lorazepam to 2 mg at bedtime, continue 1 mg BID  - Start aripiprazole 5 mg  - Decrease olanzapine to 15 mg at bedtime  - Start Cogentin 1mg BID PRN for akathesia    Continue:  - Olanzapine 10 mg Q2H PRN  - Hydroxyzine 25 mg Q4H PRN  - Trazodone 50 mg at bedtime PRN    Patient will be treated in therapeutic milieu with appropriate individual and group therapies as  described.    Medical diagnoses to be addressed this admission:    # Vitamin D Deficiency  - Continue ergocalciferol 50,000 Units Q7 days    # GI/FEN  - Docusate 250 mg daily  - Mylanta/Maalox QID PRN    # Musculoskeletal Pain  - Acetaminophen PRN  - Menthol Topical Analgesic Q6H PRN    Data: As above.  Consults: None.    Legal Status: Court hold.    Safety Assessment:   Behavioral Orders   Procedures     Code 1 - Restrict to Unit     Elopement precautions     Routine Programming     As clinically indicated     Single Room     Status 15     Every 15 minutes.       Disposition: TBD pending clinical stabilization.    Kelli Aguliar, MS3    I have reviewed and edited the documentation recorded by the medical student. This documentation accurately reflects the services I personally performed and treatment decisions made by me in consultation with the attending physician, Mauro Vann MD.    Maximiliano Bates MD  PGY-1 Resident  Pager: [phone]    Attestation:  This patient has been seen and evaluated by me, Mauro Vann.  I have discussed this patient with the house staff team including the resident and medical student and I agree with the findings and plan in this note.    I have reviewed today's vital signs, medications, labs and imaging. Mauro Vann MD     pt don't remember the date/[employer] dose 1 and 2

## 2022-02-02 NOTE — PROGRESS NOTE ADULT - ASSESSMENT
PLAN:  -complete course of rocephin for UTI  -PRN xanax for agitation/anxiey  -maitnain cymbalta  -f/u AM labs   -DVT prophylaxis

## 2022-02-02 NOTE — ED PROVIDER NOTE - PATIENT PORTAL LINK FT
You can access the FollowMyHealth Patient Portal offered by [facility] by registering at the following website: [email or web address]. By joining [employer]’s FollowMyHealth portal, you will also be able to view your health information using other applications (apps) compatible with our system.

## 2022-02-02 NOTE — CHART NOTE - NSCHARTNOTEFT_GEN_A_CORE
This report was requested by:Mei Jansen|Reference #:[id number]           You have not added a SHEFALI number.Keeping your SHEFALI number(s) up to date on the My SHEFALI #page will enable the separation of your prescriptions from others in the search results.          Others' Prescriptions        Patient Name:Zulma Neff     YOB: 1945       Address:[place] [address]     Sex:Female                     Rx Written    Rx Dispensed    Drug    Quantity    Days Supply    Prescriber Name    Prescriber Shefali #    Payment Method    Dispenser      08/06/2021 08/07/2021 alprazolam 0.5 mg tablet  14 14 Alfonso Frank MD [id number] [id number]   06/24/2021 06/29/2021 alprazolam 0.5 mg tablet  28 14 Reyes, John A MD [id number] [id number]   06/24/2021 06/24/2021 oxycodone-acetaminophen 5-325 mg tab  84 14 Reyes, John A MD [id number] [id number]   06/23/2021 06/23/2021 alprazolam 0.5 mg tablet  14 7 Alfonso Frank MD [id number] [id number]                 Patient Name:Zulma Acosta     YOB: 1945       Address:[address] [place] [address]     Sex:Female                     Rx Written    Rx Dispensed    Drug    Quantity    Days Supply    Prescriber Name    Prescriber Shefali #    Payment Method    Dispenser      03/27/2021 03/27/2021 tramadol hcl 50 mg tablet  20 10 Kaushal Lopez [id number] [id number]   04/09/2021 04/09/2021 oxycodone-acetaminophen 5-325 mg tablet  24 6 Negar Thomson [id number] [facility] #[id number]   04/13/2021 04/16/2021 oxycodone-acetaminophen 5-325 mg tablet  20 5 Reyes, John A MD [id number] [facility][id number]   06/14/2021 06/15/2021 oxycodone-acetaminophen 5-325 mg tablet  120 30 Reyes, John A MD [id number] [facility][id number]   08/30/2021 09/03/2021 alprazolam 0.5 mg tablet  28 14 ReyesSammy huerta MD [id number] [facility][id number]   08/30/2021 09/03/2021 oxycodone-acetaminophen 5-325 mg tablet  84 14 ReyesSammy jackson MD [id number] [facility][id number]   10/08/2021 10/09/2021 alprazolam 0.5 mg tablet  60 30 ReyesSammy MD [id number] [facility] #[id number]   11/08/2021 11/09/2021 oxycodone-acetaminophen 5-325 mg tablet  120 30 ReyesSammy MD [id number] [facility] #[id number]   11/08/2021 11/09/2021 alprazolam 0.5 mg tablet  60 30 ReyesSammy MD [id number] [facility] #[id number]   12/14/2021 12/16/2021 alprazolam 0.5 mg tablet  60 30 ReyesSammy MD [id number] [facility] #[id number]   01/14/2022 01/17/2022 oxycodone-acetaminophen 5-325 mg tab  120 30 ReyesSammy MD [id number] [facility] #[id number]   01/14/2022 01/17/2022 alprazolam 0.5 mg tablet  60 30 ReyesSammy MD [id number] [facility] #[id number]                 Patient Name:Zulma Acosta     YOB: 1945       Address:[address] [place] [address]     Sex:Female                     Rx Written    Rx Dispensed    Drug    Quantity    Days Supply    Prescriber Name    Prescriber Shefali #    Payment Method    Dispenser      06/04/2021 06/06/2021 oxycodone-acetaminophen 5-325 mg tablet  60 15 ReyesSammy jackson MD [id number] [facility]   06/04/2021 06/04/2021 alprazolam 0.5 mg tablet  60 30 ReyesSammy jackson MD [id number] [facility]   05/24/2021 05/26/2021 alprazolam 0.5 mg tablet  30 15 ReyesSammy jackson MD [id number] [facility]   05/25/2021 05/26/2021 oxycodone-acetaminophen 5-325 mg tablet  30 7 ReyesSammy jackson MD [id number] [facility]   05/11/2021 05/12/2021 oxycodone-acetaminophen 5-325 mg tablet  30 7 ReyesSammy huerta MD [id number] [facility]   05/06/2021 05/07/2021 alprazolam 0.5 mg tablet  30 15 ReyesSammy huerta MD [id number] [facility]   04/25/2021 04/26/2021 oxycodone-acetaminophen 5-325 mg tablet  30 7 ReyesSammy huerta MD [id number] [facility]   04/14/2021 04/15/2021 oxycodone-acetaminophen 5-325 mg tablet  20 5 Reyes, John A MD [id number] [facility]                 Patient Name:Zulma Acosta     YOB: 1945       Address:[place] [address]     Sex:Female                     Rx Written    Rx Dispensed    Drug    Quantity    Days Supply    Prescriber Name    Prescriber Shefali #    Payment Method    Dispenser      09/04/2021 09/29/2021 oxycodone-acetaminophen 5-325 mg tab  42 14 Edgard Harkins [id number] [facility][id number]   09/05/2021 09/05/2021 oxycodone-acetaminophen 5-325 mg tab  120 30 Reyes, John A MD [id number] [id number]               * - Drugs marked with an asterisk are compound drugs. If the compound drug is made up of more than one controlled substance, then each controlled substance will be a separate row in the table.

## 2022-02-03 DIAGNOSIS — N39.0 URINARY TRACT INFECTION, SITE NOT SPECIFIED: ICD-10-CM

## 2022-02-03 DIAGNOSIS — Z65.9 PROBLEM RELATED TO UNSPECIFIED PSYCHOSOCIAL CIRCUMSTANCES: ICD-10-CM

## 2022-02-03 DIAGNOSIS — F05 DELIRIUM DUE TO KNOWN PHYSIOLOGICAL CONDITION: ICD-10-CM

## 2022-02-03 LAB
ANION GAP SERPL CALC-SCNC: 11 MMOL/L — SIGNIFICANT CHANGE UP (ref 5–17)
BASOPHILS # BLD AUTO: 0 K/UL — SIGNIFICANT CHANGE UP (ref 0–0.2)
BASOPHILS NFR BLD AUTO: 0 % — SIGNIFICANT CHANGE UP (ref 0–2)
BUN SERPL-MCNC: 29 MG/DL — HIGH (ref 7–23)
CALCIUM SERPL-MCNC: 8.9 MG/DL — SIGNIFICANT CHANGE UP (ref 8.4–10.5)
CHLORIDE SERPL-SCNC: 108 MMOL/L — SIGNIFICANT CHANGE UP (ref 96–108)
CO2 SERPL-SCNC: 24 MMOL/L — SIGNIFICANT CHANGE UP (ref 22–31)
CREAT SERPL-MCNC: 0.98 MG/DL — SIGNIFICANT CHANGE UP (ref 0.5–1.3)
EOSINOPHIL # BLD AUTO: 0.34 K/UL — SIGNIFICANT CHANGE UP (ref 0–0.5)
EOSINOPHIL NFR BLD AUTO: 3 % — SIGNIFICANT CHANGE UP (ref 0–6)
GLUCOSE SERPL-MCNC: 84 MG/DL — SIGNIFICANT CHANGE UP (ref 70–99)
HCT VFR BLD CALC: 38.7 % — SIGNIFICANT CHANGE UP (ref 34.5–45)
HGB BLD-MCNC: 12.3 G/DL — SIGNIFICANT CHANGE UP (ref 11.5–15.5)
LYMPHOCYTES # BLD AUTO: 46 % — HIGH (ref 13–44)
LYMPHOCYTES # BLD AUTO: 5.28 K/UL — HIGH (ref 1–3.3)
MANUAL SMEAR VERIFICATION: SIGNIFICANT CHANGE UP
MCHC RBC-ENTMCNC: 29.4 PG — SIGNIFICANT CHANGE UP (ref 27–34)
MCHC RBC-ENTMCNC: 31.8 GM/DL — LOW (ref 32–36)
MCV RBC AUTO: 92.4 FL — SIGNIFICANT CHANGE UP (ref 80–100)
MONOCYTES # BLD AUTO: 0.8 K/UL — SIGNIFICANT CHANGE UP (ref 0–0.9)
MONOCYTES NFR BLD AUTO: 7 % — SIGNIFICANT CHANGE UP (ref 2–14)
NEUTROPHILS # BLD AUTO: 5.05 K/UL — SIGNIFICANT CHANGE UP (ref 1.8–7.4)
NEUTROPHILS NFR BLD AUTO: 44 % — SIGNIFICANT CHANGE UP (ref 43–77)
NRBC # BLD: 0 /100 — SIGNIFICANT CHANGE UP (ref 0–0)
PLAT MORPH BLD: NORMAL — SIGNIFICANT CHANGE UP
PLATELET # BLD AUTO: 243 K/UL — SIGNIFICANT CHANGE UP (ref 150–400)
POTASSIUM SERPL-MCNC: 4.5 MMOL/L — SIGNIFICANT CHANGE UP (ref 3.5–5.3)
POTASSIUM SERPL-SCNC: 4.5 MMOL/L — SIGNIFICANT CHANGE UP (ref 3.5–5.3)
RBC # BLD: 4.19 M/UL — SIGNIFICANT CHANGE UP (ref 3.8–5.2)
RBC # FLD: 16.4 % — HIGH (ref 10.3–14.5)
RBC BLD AUTO: NORMAL — SIGNIFICANT CHANGE UP
SODIUM SERPL-SCNC: 143 MMOL/L — SIGNIFICANT CHANGE UP (ref 135–145)
WBC # BLD: 11.48 K/UL — HIGH (ref 3.8–10.5)
WBC # FLD AUTO: 11.48 K/UL — HIGH (ref 3.8–10.5)

## 2022-02-03 PROCEDURE — 99232 SBSQ HOSP IP/OBS MODERATE 35: CPT

## 2022-02-03 PROCEDURE — 90792 PSYCH DIAG EVAL W/MED SRVCS: CPT

## 2022-02-03 RX ADMIN — DULOXETINE HYDROCHLORIDE 20 MILLIGRAM(S): 30 CAPSULE, DELAYED RELEASE ORAL at 06:28

## 2022-02-03 RX ADMIN — OXYCODONE AND ACETAMINOPHEN 1 TABLET(S): 5; 325 TABLET ORAL at 06:28

## 2022-02-03 RX ADMIN — GABAPENTIN 100 MILLIGRAM(S): 400 CAPSULE ORAL at 21:31

## 2022-02-03 RX ADMIN — CEFTRIAXONE 100 MILLIGRAM(S): 500 INJECTION, POWDER, FOR SOLUTION INTRAMUSCULAR; INTRAVENOUS at 23:35

## 2022-02-03 RX ADMIN — POLYETHYLENE GLYCOL 3350 17 GRAM(S): 17 POWDER, FOR SOLUTION ORAL at 13:08

## 2022-02-03 RX ADMIN — PANTOPRAZOLE SODIUM 40 MILLIGRAM(S): 20 TABLET, DELAYED RELEASE ORAL at 06:28

## 2022-02-03 RX ADMIN — ENOXAPARIN SODIUM 40 MILLIGRAM(S): 100 INJECTION SUBCUTANEOUS at 18:11

## 2022-02-03 RX ADMIN — GABAPENTIN 100 MILLIGRAM(S): 400 CAPSULE ORAL at 13:08

## 2022-02-03 RX ADMIN — Medication 50 MICROGRAM(S): at 06:28

## 2022-02-03 RX ADMIN — DULOXETINE HYDROCHLORIDE 20 MILLIGRAM(S): 30 CAPSULE, DELAYED RELEASE ORAL at 18:11

## 2022-02-03 RX ADMIN — SENNA PLUS 2 TABLET(S): 8.6 TABLET ORAL at 21:31

## 2022-02-03 RX ADMIN — OXYCODONE AND ACETAMINOPHEN 1 TABLET(S): 5; 325 TABLET ORAL at 07:00

## 2022-02-03 RX ADMIN — OXYCODONE AND ACETAMINOPHEN 1 TABLET(S): 5; 325 TABLET ORAL at 22:30

## 2022-02-03 RX ADMIN — SIMVASTATIN 10 MILLIGRAM(S): 20 TABLET, FILM COATED ORAL at 21:31

## 2022-02-03 RX ADMIN — Medication 0.5 MILLIGRAM(S): at 18:11

## 2022-02-03 RX ADMIN — OXYCODONE AND ACETAMINOPHEN 1 TABLET(S): 5; 325 TABLET ORAL at 21:34

## 2022-02-03 RX ADMIN — GABAPENTIN 100 MILLIGRAM(S): 400 CAPSULE ORAL at 06:28
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
## 2022-02-03 NOTE — PHYSICAL THERAPY INITIAL EVALUATION ADULT - ADDITIONAL COMMENTS
hard copy, drawn during this pregnancy
pt lives w/spouse & dtr in private house, ramp to enter, 1 aleks, no stairs in house that pt uses. pt is minimally ambulatory; transfers to wheelchair w/rollator and assist from spouse. pt also has a straight cane and shower chair, grab bars, pt has HHA 7 hrs/day

## 2022-02-03 NOTE — GOALS OF CARE CONVERSATION - ADVANCED CARE PLANNING - CONVERSATION DETAILS
Met with patient at bedside. She is A&Ox3. Admitted to [facility] from home with UTI. Has hx. chronic UTIs, back pain, anxiety, HTN. She lives with  and daughter. She generally uses wheelchair or rollator at home. Has hx. of being mildly forgetful. Pt. signed MOLST DNR/I on 12/7/21. However when I reviewed this with her today she stated " I want to live", and asked that the 12/7/21 MOLST be rescinded. MD notified that patient is Full Code.

## 2022-02-03 NOTE — BH CONSULTATION LIAISON ASSESSMENT NOTE - NSBHCHARTREVIEWLAB_PSY_A_CORE FT
12.3   11.48 )-----------( 243      ( 03 Feb 2022 06:30 )             38.7   02-03    143  |  108  |  29<H>  ----------------------------<  84  4.5   |  24  |  0.98    Ca    8.9      03 Feb 2022 06:30    TPro  7.6  /  Alb  3.3  /  TBili  0.3  /  DBili  x   /  AST  17  /  ALT  37  /  AlkPhos  75  02-02

## 2022-02-03 NOTE — BH CONSULTATION LIAISON ASSESSMENT NOTE - NSICDXBHSECONDARYDX_PSY_ALL_CORE
Acute UTI   N39.0  Problem related to psychosocial circumstances   Z65.9  Delirium due to other cause   F05

## 2022-02-03 NOTE — BH CONSULTATION LIAISON ASSESSMENT NOTE - NSBHCHARTREVIEWVS_PSY_A_CORE FT
Vital Signs Last 24 Hrs  T(C): 36.7 (03 Feb 2022 11:36), Max: 36.8 (02 Feb 2022 19:45)  T(F): 98 (03 Feb 2022 11:36), Max: 98.2 (02 Feb 2022 19:45)  HR: 80 (03 Feb 2022 11:36) (71 - 92)  BP: 148/83 (03 Feb 2022 11:36) (134/80 - 148/83)  BP(mean): --  RR: 16 (03 Feb 2022 11:36) (11 - 17)  SpO2: 95% (03 Feb 2022 11:36) (95% - 97%)

## 2022-02-03 NOTE — BH CONSULTATION LIAISON ASSESSMENT NOTE - RISK ASSESSMENT
Pt seen as a low acute suicide risk. She is not acutely suicidal, homicidal, manic or psychotic, she is not interested in hospitalization and does not meet criteria for involuntary psychiatric hospitalization.  Risk factors: remote cutting behavior in 2013 and active mood disorder, psychosocial concerns. Two remote psychiatric hospitalizations.  Protective factors: Able to list daughter and cats as protective factors. On antidepressant.
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
## 2022-02-03 NOTE — BH CONSULTATION LIAISON ASSESSMENT NOTE - NSBHCONSULTFOLLOWAFTERCARE_PSY_A_CORE FT
Consider [facility] vs Assisted living. Pt however would like to return home and decision is up to her.
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
## 2022-02-03 NOTE — PROGRESS NOTE ADULT - SUBJECTIVE AND OBJECTIVE BOX
Patient is a 76y old  Female who presents with a chief complaint of UTI, placement       Patient seen and examined at bedside. Pt states she feels well, denies overnight events or current complaints including chest pain, shortness of breath, dizziness, nausea, vomiting, diarrhea, fever or chills.      ALLERGIES:  epinephrine (Unknown)  norepinephrine (Unknown)    MEDICATIONS  (STANDING):  ALPRAZolam 0.5 milliGRAM(s) Oral two times a day  cefTRIAXone   IVPB 1000 milliGRAM(s) IV Intermittent every 24 hours  DULoxetine 20 milliGRAM(s) Oral two times a day  enoxaparin Injectable 40 milliGRAM(s) SubCutaneous every 12 hours  gabapentin 100 milliGRAM(s) Oral three times a day  levothyroxine 50 MICROGram(s) Oral daily  pantoprazole    Tablet 40 milliGRAM(s) Oral before breakfast  polyethylene glycol 3350 17 Gram(s) Oral daily  senna 2 Tablet(s) Oral at bedtime  simvastatin 10 milliGRAM(s) Oral at bedtime    MEDICATIONS  (PRN):  melatonin 3 milliGRAM(s) Oral at bedtime PRN Insomnia  oxycodone    5 mG/acetaminophen 325 mG 1 Tablet(s) Oral every 8 hours PRN Moderate Pain (4 - 6)    Vital Signs Last 24 Hrs  T(F): 98 (2022 11:36), Max: 98.2 (2022 19:45)  HR: 80 (2022 11:36) (71 - 92)  BP: 148/83 (2022 11:36) (134/80 - 148/83)  RR: 16 (2022 11:36) (11 - 17)  SpO2: 95% (2022 11:36) (95% - 97%)  I&O's Summary    2022 07:01  -  2022 07:00  --------------------------------------------------------  IN: 0 mL / OUT: 400 mL / NET: -400 mL    2022 07:01  -  2022 14:30  --------------------------------------------------------  IN: 240 mL / OUT: 300 mL / NET: -60 mL      PHYSICAL EXAM:  General: NAD, A/O x 3  ENT: MMM, no oral thrush   Neck: Supple, No JVD  Lungs: Clear to auscultation bilaterally, non labored breathing  Cardio: RRR, S1/S2, No murmurs  Abdomen: Soft, mild lower abdominal/pelvic tenderness, Nondistended; Bowel sounds present  Extremities: No calf tenderness, No pitting edema    LABS:                        12.3   11.48 )-----------( 243      ( 2022 06:30 )             38.7         143  |  108  |  29  ----------------------------<  84  4.5   |  24  |  0.98    Ca    8.9      2022 06:30    TPro  7.6  /  Alb  3.3  /  TBili  0.3  /  DBili  x   /  AST  17  /  ALT  37  /  AlkPhos  75      eGFR if Non African American: 56 mL/min/1.73M2 (22 @ 06:30)  eGFR if : 65 mL/min/1.73M2 (22 @ 06:30)                              Urinalysis Basic - ( 2022 17:50 )    Color: Yellow / Appearance: Clear / S.015 / pH: x  Gluc: x / Ketone: Negative  / Bili: Negative / Urobili: Negative   Blood: x / Protein: 30 mg/dL / Nitrite: Positive   Leuk Esterase: Moderate / RBC: 0-4 /HPF / WBC 26-50 /HPF   Sq Epi: x / Non Sq Epi: Neg.-Few / Bacteria: Many /HPF        COVID-19 PCR: NotDetec (22 @ 19:30)      RADIOLOGY & ADDITIONAL TESTS:    Care Discussed with Consultants/Other Providers:    Patient is a 76y old  Female who presents with a chief complaint of UTI, placement       Patient seen and examined at bedside. Pt states she feels well, denies overnight events or current complaints including chest pain, shortness of breath, dizziness, nausea, vomiting, diarrhea, fever or chills.      ALLERGIES:  epinephrine (Unknown)  norepinephrine (Unknown)    MEDICATIONS  (STANDING):  ALPRAZolam 0.5 milliGRAM(s) Oral two times a day  cefTRIAXone   IVPB 1000 milliGRAM(s) IV Intermittent every 24 hours  DULoxetine 20 milliGRAM(s) Oral two times a day  enoxaparin Injectable 40 milliGRAM(s) SubCutaneous every 12 hours  gabapentin 100 milliGRAM(s) Oral three times a day  levothyroxine 50 MICROGram(s) Oral daily  pantoprazole    Tablet 40 milliGRAM(s) Oral before breakfast  polyethylene glycol 3350 17 Gram(s) Oral daily  senna 2 Tablet(s) Oral at bedtime  simvastatin 10 milliGRAM(s) Oral at bedtime    MEDICATIONS  (PRN):  melatonin 3 milliGRAM(s) Oral at bedtime PRN Insomnia  oxycodone    5 mG/acetaminophen 325 mG 1 Tablet(s) Oral every 8 hours PRN Moderate Pain (4 - 6)    Vital Signs Last 24 Hrs  T(F): 98 (2022 11:36), Max: 98.2 (2022 19:45)  HR: 80 (2022 11:36) (71 - 92)  BP: 148/83 (2022 11:36) (134/80 - 148/83)  RR: 16 (2022 11:36) (11 - 17)  SpO2: 95% (2022 11:36) (95% - 97%)  I&O's Summary    2022 07:01  -  2022 07:00  --------------------------------------------------------  IN: 0 mL / OUT: 400 mL / NET: -400 mL    2022 07:01  -  2022 14:30  --------------------------------------------------------  IN: 240 mL / OUT: 300 mL / NET: -60 mL      PHYSICAL EXAM:  General: NAD, A/O x 3  ENT: MMM, no oral thrush   Neck: Supple, No JVD  Lungs: Clear to auscultation bilaterally, non labored breathing  Cardio: RRR, S1/S2, No murmurs  Abdomen: Soft, mild lower abdominal/pelvic tenderness, Nondistended; Bowel sounds present  Extremities: No calf tenderness, No pitting edema    LABS:                        12.3   11.48 )-----------( 243      ( 2022 06:30 )             38.7         143  |  108  |  29  ----------------------------<  84  4.5   |  24  |  0.98    Ca    8.9      2022 06:30    TPro  7.6  /  Alb  3.3  /  TBili  0.3  /  DBili  x   /  AST  17  /  ALT  37  /  AlkPhos  75      eGFR if Non African American: 56 mL/min/1.73M2 (22 @ 06:30)  eGFR if : 65 mL/min/1.73M2 (22 @ 06:30)                      Urinalysis Basic - ( 2022 17:50 )    Color: Yellow / Appearance: Clear / S.015 / pH: x  Gluc: x / Ketone: Negative  / Bili: Negative / Urobili: Negative   Blood: x / Protein: 30 mg/dL / Nitrite: Positive   Leuk Esterase: Moderate / RBC: 0-4 /HPF / WBC 26-50 /HPF   Sq Epi: x / Non Sq Epi: Neg.-Few / Bacteria: Many /HPF        COVID-19 PCR: NotDetec (22 @ 19:30)      RADIOLOGY & ADDITIONAL TESTS:    Care Discussed with Consultants/Other Providers:    Patient is a 76y old  Female who presents with a chief complaint of UTI, placement     Patient seen and examined at bedside. Pt states she feels well, denies overnight events or current complaints including chest pain, shortness of breath, dizziness, nausea, vomiting, diarrhea, fever or chills.      ALLERGIES:  epinephrine (Unknown)  norepinephrine (Unknown)    MEDICATIONS  (STANDING):  ALPRAZolam 0.5 milliGRAM(s) Oral two times a day  cefTRIAXone   IVPB 1000 milliGRAM(s) IV Intermittent every 24 hours  DULoxetine 20 milliGRAM(s) Oral two times a day  enoxaparin Injectable 40 milliGRAM(s) SubCutaneous every 12 hours  gabapentin 100 milliGRAM(s) Oral three times a day  levothyroxine 50 MICROGram(s) Oral daily  pantoprazole    Tablet 40 milliGRAM(s) Oral before breakfast  polyethylene glycol 3350 17 Gram(s) Oral daily  senna 2 Tablet(s) Oral at bedtime  simvastatin 10 milliGRAM(s) Oral at bedtime    MEDICATIONS  (PRN):  melatonin 3 milliGRAM(s) Oral at bedtime PRN Insomnia  oxycodone    5 mG/acetaminophen 325 mG 1 Tablet(s) Oral every 8 hours PRN Moderate Pain (4 - 6)    Vital Signs Last 24 Hrs  T(F): 98 (2022 11:36), Max: 98.2 (2022 19:45)  HR: 80 (2022 11:36) (71 - 92)  BP: 148/83 (2022 11:36) (134/80 - 148/83)  RR: 16 (2022 11:36) (11 - 17)  SpO2: 95% (2022 11:36) (95% - 97%)  I&O's Summary    2022 07:01  -  2022 07:00  --------------------------------------------------------  IN: 0 mL / OUT: 400 mL / NET: -400 mL    2022 07:01  -  2022 14:30  --------------------------------------------------------  IN: 240 mL / OUT: 300 mL / NET: -60 mL      PHYSICAL EXAM:  General: NAD, A/O x 3  ENT: MMM, no oral thrush   Neck: Supple, No JVD  Lungs: Clear to auscultation bilaterally, non labored breathing  Cardio: RRR, S1/S2, No murmurs  Abdomen: Soft, mild lower abdominal/pelvic tenderness, Nondistended; Bowel sounds present  Extremities: No calf tenderness, No pitting edema    LABS:                        12.3   11.48 )-----------( 243      ( 2022 06:30 )             38.7         143  |  108  |  29  ----------------------------<  84  4.5   |  24  |  0.98    Ca    8.9      2022 06:30    TPro  7.6  /  Alb  3.3  /  TBili  0.3  /  DBili  x   /  AST  17  /  ALT  37  /  AlkPhos  75      eGFR if Non African American: 56 mL/min/1.73M2 (22 @ 06:30)  eGFR if : 65 mL/min/1.73M2 (22 @ 06:30)                      Urinalysis Basic - ( 2022 17:50 )    Color: Yellow / Appearance: Clear / S.015 / pH: x  Gluc: x / Ketone: Negative  / Bili: Negative / Urobili: Negative   Blood: x / Protein: 30 mg/dL / Nitrite: Positive   Leuk Esterase: Moderate / RBC: 0-4 /HPF / WBC 26-50 /HPF   Sq Epi: x / Non Sq Epi: Neg.-Few / Bacteria: Many /HPF        COVID-19 PCR: NotDetec (22 @ 19:30)      RADIOLOGY & ADDITIONAL TESTS:    Care Discussed with Consultants/Other Providers:

## 2022-02-03 NOTE — BH CONSULTATION LIAISON ASSESSMENT NOTE - CURRENT MEDICATION
MEDICATIONS  (STANDING):  ALPRAZolam 0.5 milliGRAM(s) Oral two times a day  cefTRIAXone   IVPB 1000 milliGRAM(s) IV Intermittent every 24 hours  DULoxetine 20 milliGRAM(s) Oral two times a day  enoxaparin Injectable 40 milliGRAM(s) SubCutaneous every 12 hours  gabapentin 100 milliGRAM(s) Oral three times a day  levothyroxine 50 MICROGram(s) Oral daily  pantoprazole    Tablet 40 milliGRAM(s) Oral before breakfast  polyethylene glycol 3350 17 Gram(s) Oral daily  senna 2 Tablet(s) Oral at bedtime  simvastatin 10 milliGRAM(s) Oral at bedtime    MEDICATIONS  (PRN):  melatonin 3 milliGRAM(s) Oral at bedtime PRN Insomnia  oxycodone    5 mG/acetaminophen 325 mG 1 Tablet(s) Oral every 8 hours PRN Moderate Pain (4 - 6)
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
## 2022-02-03 NOTE — BH CONSULTATION LIAISON ASSESSMENT NOTE - NSBHCHARTREVIEWINVESTIGATE_PSY_A_CORE FT
Urine Microscopic-Add On (NC) (12.03.21 @ 09:20)    Red Blood Cell - Urine: 0-4 /HPF    White Blood Cell - Urine: >50 /HPF    Bacteria: Many /HPF    Epithelial Cells: Neg.-Few    Comment - Urine: few mucous thread    Head CT unremarkable,  urine tox + for benzodiazepines.

## 2022-02-04 ENCOUNTER — TRANSCRIPTION ENCOUNTER (OUTPATIENT)
Age: 77
End: 2022-02-04

## 2022-02-04 DIAGNOSIS — E78.5 HYPERLIPIDEMIA, UNSPECIFIED: ICD-10-CM

## 2022-02-04 DIAGNOSIS — E03.9 HYPOTHYROIDISM, UNSPECIFIED: ICD-10-CM

## 2022-02-04 PROCEDURE — 99232 SBSQ HOSP IP/OBS MODERATE 35: CPT

## 2022-02-04 RX ORDER — CEFUROXIME AXETIL 250 MG
1 TABLET ORAL
Qty: 4 | Refills: 0
Start: 2022-02-04 | End: 2022-02-05

## 2022-02-04 RX ORDER — CEFTRIAXONE 500 MG/1
1000 INJECTION, POWDER, FOR SOLUTION INTRAMUSCULAR; INTRAVENOUS EVERY 24 HOURS
Refills: 0 | Status: DISCONTINUED | OUTPATIENT
Start: 2022-02-04 | End: 2022-02-04

## 2022-02-04 RX ORDER — CEFTRIAXONE 500 MG/1
1000 INJECTION, POWDER, FOR SOLUTION INTRAMUSCULAR; INTRAVENOUS ONCE
Refills: 0 | Status: COMPLETED | OUTPATIENT
Start: 2022-02-04 | End: 2022-02-04

## 2022-02-04 RX ADMIN — SENNA PLUS 2 TABLET(S): 8.6 TABLET ORAL at 21:15

## 2022-02-04 RX ADMIN — GABAPENTIN 100 MILLIGRAM(S): 400 CAPSULE ORAL at 05:31

## 2022-02-04 RX ADMIN — Medication 0.5 MILLIGRAM(S): at 17:49

## 2022-02-04 RX ADMIN — ENOXAPARIN SODIUM 40 MILLIGRAM(S): 100 INJECTION SUBCUTANEOUS at 05:31

## 2022-02-04 RX ADMIN — Medication 50 MICROGRAM(S): at 05:31

## 2022-02-04 RX ADMIN — GABAPENTIN 100 MILLIGRAM(S): 400 CAPSULE ORAL at 14:50

## 2022-02-04 RX ADMIN — DULOXETINE HYDROCHLORIDE 20 MILLIGRAM(S): 30 CAPSULE, DELAYED RELEASE ORAL at 17:49

## 2022-02-04 RX ADMIN — Medication 0.5 MILLIGRAM(S): at 05:31

## 2022-02-04 RX ADMIN — PANTOPRAZOLE SODIUM 40 MILLIGRAM(S): 20 TABLET, DELAYED RELEASE ORAL at 05:31

## 2022-02-04 RX ADMIN — OXYCODONE AND ACETAMINOPHEN 1 TABLET(S): 5; 325 TABLET ORAL at 06:49

## 2022-02-04 RX ADMIN — SIMVASTATIN 10 MILLIGRAM(S): 20 TABLET, FILM COATED ORAL at 21:15

## 2022-02-04 RX ADMIN — POLYETHYLENE GLYCOL 3350 17 GRAM(S): 17 POWDER, FOR SOLUTION ORAL at 14:50

## 2022-02-04 RX ADMIN — CEFTRIAXONE 100 MILLIGRAM(S): 500 INJECTION, POWDER, FOR SOLUTION INTRAMUSCULAR; INTRAVENOUS at 23:44

## 2022-02-04 RX ADMIN — DULOXETINE HYDROCHLORIDE 20 MILLIGRAM(S): 30 CAPSULE, DELAYED RELEASE ORAL at 05:31

## 2022-02-04 RX ADMIN — GABAPENTIN 100 MILLIGRAM(S): 400 CAPSULE ORAL at 21:15

## 2022-02-04 RX ADMIN — OXYCODONE AND ACETAMINOPHEN 1 TABLET(S): 5; 325 TABLET ORAL at 16:08

## 2022-02-04 RX ADMIN — OXYCODONE AND ACETAMINOPHEN 1 TABLET(S): 5; 325 TABLET ORAL at 08:07

## 2022-02-04 RX ADMIN — ENOXAPARIN SODIUM 40 MILLIGRAM(S): 100 INJECTION SUBCUTANEOUS at 17:49

## 2022-02-04 NOTE — DISCHARGE NOTE PROVIDER - NSDCMRMEDTOKEN_GEN_ALL_CORE_FT
ALPRAZolam 0.5 mg oral tablet: 1 tab(s) orally 2 times a day, As Needed  Cymbalta 20 mg oral delayed release capsule: 1 cap(s) orally 2 times a day  gabapentin 100 mg oral capsule: 1 cap(s) orally 3 times a day  levothyroxine 50 mcg (0.05 mg) oral tablet: 1 tab(s) orally once a day  melatonin 3 mg oral tablet: 2 tab(s) orally once a day (at bedtime)  oxycodone-acetaminophen 5 mg-325 mg oral tablet: 1 tab(s) orally 3 times a day  polyethylene glycol 3350 oral powder for reconstitution: 17 gram(s) orally once a day as needed for contipation  senna oral tablet: 2 tab(s) orally once a day (at bedtime) as needed for constipation  simvastatin 10 mg oral tablet: 1 tab(s) orally once a day (at bedtime)  Vitamin D2 1.25 mg (50,000 intl units) oral capsule: 1 cap(s) orally once a week   ALPRAZolam 0.5 mg oral tablet: 1 tab(s) orally 2 times a day, As Needed  cefuroxime 250 mg oral tablet: 1 tab(s) orally 2 times a day   Cymbalta 20 mg oral delayed release capsule: 1 cap(s) orally 2 times a day  gabapentin 100 mg oral capsule: 1 cap(s) orally 3 times a day  levothyroxine 50 mcg (0.05 mg) oral tablet: 1 tab(s) orally once a day  melatonin 3 mg oral tablet: 2 tab(s) orally once a day (at bedtime)  oxycodone-acetaminophen 5 mg-325 mg oral tablet: 1 tab(s) orally 3 times a day  polyethylene glycol 3350 oral powder for reconstitution: 17 gram(s) orally once a day as needed for contipation  senna oral tablet: 2 tab(s) orally once a day (at bedtime) as needed for constipation  simvastatin 10 mg oral tablet: 1 tab(s) orally once a day (at bedtime)  Vitamin D2 1.25 mg (50,000 intl units) oral capsule: 1 cap(s) orally once a week   ALPRAZolam 0.5 mg oral tablet: 1 tab(s) orally 2 times a day, As Needed  cefuroxime 250 mg oral tablet: 1 tab(s) orally 2 times a day   Cymbalta 20 mg oral delayed release capsule: 1 cap(s) orally 2 times a day  gabapentin 100 mg oral capsule: 1 cap(s) orally 3 times a day  hydrocortisone 25 mg rectal suppository: 1 suppository(ies) rectal once a day  levothyroxine 50 mcg (0.05 mg) oral tablet: 1 tab(s) orally once a day  melatonin 3 mg oral tablet: 2 tab(s) orally once a day (at bedtime)  oxycodone-acetaminophen 5 mg-325 mg oral tablet: 1 tab(s) orally 3 times a day  polyethylene glycol 3350 oral powder for reconstitution: 17 gram(s) orally once a day as needed for contipation  senna oral tablet: 2 tab(s) orally once a day (at bedtime) as needed for constipation  simvastatin 10 mg oral tablet: 1 tab(s) orally once a day (at bedtime)  Vitamin D2 1.25 mg (50,000 intl units) oral capsule: 1 cap(s) orally once a week
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
## 2022-02-04 NOTE — DISCHARGE NOTE NURSING/CASE MANAGEMENT/SOCIAL WORK - NSDCPEFALRISK_GEN_ALL_CORE
For information on Fall & Injury Prevention, visit: [email or web address] OR  [email or web address] OR  https://www.cdc.gov/steadi/patient.html

## 2022-02-04 NOTE — DISCHARGE NOTE PROVIDER - NSDCCPCAREPLAN_GEN_ALL_CORE_FT
PRINCIPAL DISCHARGE DIAGNOSIS  Diagnosis: Acute UTI  Assessment and Plan of Treatment: You were given IV antibiotics. You jayden continue oral antibiotics for 2 more days. Follow up with your primary care doctor in the next 1-2 weeks. Follow up with Shabana newman at [facility] [phone]

## 2022-02-04 NOTE — DISCHARGE NOTE NURSING/CASE MANAGEMENT/SOCIAL WORK - NSDCVIVACCINE_GEN_ALL_CORE_FT
Tdap; 14-Apr-2021 15:25; Chantelle Meehan (RN); [employer]; [id number] (Exp. Date: 21-Jul-2022); IntraMuscular; Deltoid Right.; 0.5 milliLiter(s); VIS (VIS Published: 09-May-2013, VIS Presented: 14-Apr-2021);

## 2022-02-04 NOTE — DISCHARGE NOTE PROVIDER - NSDCFUSCHEDAPPT_GEN_ALL_CORE_FT
TAYLOR GOODMAN ; 03/09/2022 ; NPP Rheum [address] TAYLOR GOODMAN ; 02/15/2022 ; NPP Intmed [address] [facility], [place]  TAYLOR GOODMAN ; 03/09/2022 ; NPP Rheum [address] [facility]

## 2022-02-04 NOTE — DISCHARGE NOTE PROVIDER - CARE PROVIDER_API CALL
Reyes, John A (MD)  Internal Medicine  [address] [facility], [address]  [place] [address]  Phone: [phone]  Fax: [phone]  Follow Up Time:

## 2022-02-04 NOTE — DISCHARGE NOTE NURSING/CASE MANAGEMENT/SOCIAL WORK - PATIENT PORTAL LINK FT
You can access the FollowMyHealth Patient Portal offered by [facility] by registering at the following website: [email or web address]. By joining [employer]’s FollowMyHealth portal, you will also be able to view your health information using other applications (apps) compatible with our system.

## 2022-02-05 VITALS
OXYGEN SATURATION: 99 % | RESPIRATION RATE: 18 BRPM | DIASTOLIC BLOOD PRESSURE: 71 MMHG | SYSTOLIC BLOOD PRESSURE: 108 MMHG | HEART RATE: 66 BPM

## 2022-02-05 PROCEDURE — 71045 X-RAY EXAM CHEST 1 VIEW: CPT

## 2022-02-05 PROCEDURE — 87086 URINE CULTURE/COLONY COUNT: CPT

## 2022-02-05 PROCEDURE — 87635 SARS-COV-2 COVID-19 AMP PRB: CPT

## 2022-02-05 PROCEDURE — 99239 HOSP IP/OBS DSCHRG MGMT >30: CPT

## 2022-02-05 PROCEDURE — 93005 ELECTROCARDIOGRAM TRACING: CPT

## 2022-02-05 PROCEDURE — 81001 URINALYSIS AUTO W/SCOPE: CPT

## 2022-02-05 PROCEDURE — 97162 PT EVAL MOD COMPLEX 30 MIN: CPT

## 2022-02-05 PROCEDURE — 80048 BASIC METABOLIC PNL TOTAL CA: CPT

## 2022-02-05 PROCEDURE — 85025 COMPLETE CBC W/AUTO DIFF WBC: CPT

## 2022-02-05 PROCEDURE — 99285 EMERGENCY DEPT VISIT HI MDM: CPT | Mod: 25

## 2022-02-05 PROCEDURE — 36415 COLL VENOUS BLD VENIPUNCTURE: CPT

## 2022-02-05 PROCEDURE — 80053 COMPREHEN METABOLIC PANEL: CPT

## 2022-02-05 PROCEDURE — 70450 CT HEAD/BRAIN W/O DYE: CPT | Mod: MA

## 2022-02-05 PROCEDURE — 87186 SC STD MICRODIL/AGAR DIL: CPT

## 2022-02-05 RX ORDER — HYDROCORTISONE 1 %
1 OINTMENT (GRAM) TOPICAL
Qty: 5 | Refills: 0
Start: 2022-02-05 | End: 2022-02-09

## 2022-02-05 RX ORDER — HYDROCORTISONE 1 %
1 OINTMENT (GRAM) TOPICAL DAILY
Refills: 0 | Status: DISCONTINUED | OUTPATIENT
Start: 2022-02-05 | End: 2022-02-05

## 2022-02-05 RX ORDER — CEFUROXIME AXETIL 250 MG
1 TABLET ORAL
Qty: 4 | Refills: 0
Start: 2022-02-05 | End: 2022-02-06

## 2022-02-05 RX ADMIN — Medication 50 MICROGRAM(S): at 05:32

## 2022-02-05 RX ADMIN — Medication 0.5 MILLIGRAM(S): at 05:32

## 2022-02-05 RX ADMIN — PANTOPRAZOLE SODIUM 40 MILLIGRAM(S): 20 TABLET, DELAYED RELEASE ORAL at 05:32

## 2022-02-05 RX ADMIN — OXYCODONE AND ACETAMINOPHEN 1 TABLET(S): 5; 325 TABLET ORAL at 00:55

## 2022-02-05 RX ADMIN — OXYCODONE AND ACETAMINOPHEN 1 TABLET(S): 5; 325 TABLET ORAL at 08:37

## 2022-02-05 RX ADMIN — OXYCODONE AND ACETAMINOPHEN 1 TABLET(S): 5; 325 TABLET ORAL at 00:00

## 2022-02-05 RX ADMIN — ENOXAPARIN SODIUM 40 MILLIGRAM(S): 100 INJECTION SUBCUTANEOUS at 05:32

## 2022-02-05 RX ADMIN — DULOXETINE HYDROCHLORIDE 20 MILLIGRAM(S): 30 CAPSULE, DELAYED RELEASE ORAL at 05:33

## 2022-02-05 RX ADMIN — OXYCODONE AND ACETAMINOPHEN 1 TABLET(S): 5; 325 TABLET ORAL at 00:17

## 2022-02-05 RX ADMIN — GABAPENTIN 100 MILLIGRAM(S): 400 CAPSULE ORAL at 05:34

## 2022-02-05 NOTE — PROGRESS NOTE ADULT - ATTENDING COMMENTS
DC home today. Completed 3d of CTX for cystitis.  PT eval noted, patient refused SHIVANI.   Full Code
DC today. Outpatient follow up.
Admitted for UTI, agitation  Psych input appreciated. PT recommends SHIVANI, however patient refuses.
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
## 2022-02-05 NOTE — PROGRESS NOTE ADULT - SUBJECTIVE AND OBJECTIVE BOX
Patient is a 76y old  Female who presents with a chief complaint of UTI, placement (2022 11:10)      Patient seen and examined at bedside.    ALLERGIES:  epinephrine (Unknown)  norepinephrine (Unknown)    MEDICATIONS  (STANDING):  ALPRAZolam 0.5 milliGRAM(s) Oral two times a day  DULoxetine 20 milliGRAM(s) Oral two times a day  enoxaparin Injectable 40 milliGRAM(s) SubCutaneous every 12 hours  gabapentin 100 milliGRAM(s) Oral three times a day  levothyroxine 50 MICROGram(s) Oral daily  pantoprazole    Tablet 40 milliGRAM(s) Oral before breakfast  polyethylene glycol 3350 17 Gram(s) Oral daily  senna 2 Tablet(s) Oral at bedtime  simvastatin 10 milliGRAM(s) Oral at bedtime    MEDICATIONS  (PRN):  melatonin 3 milliGRAM(s) Oral at bedtime PRN Insomnia  oxycodone    5 mG/acetaminophen 325 mG 1 Tablet(s) Oral every 8 hours PRN Moderate Pain (4 - 6)    Vital Signs Last 24 Hrs  T(F): 98.3 (2022 07:55), Max: 98.6 (2022 16:00)  HR: 77 (2022 07:55) (77 - 91)  BP: 125/74 (2022 07:55) (111/73 - 126/84)  RR: 18 (2022 07:55) (16 - 20)  SpO2: 95% (2022 07:55) (92% - 96%)  I&O's Summary    2022 07:01  -  2022 07:00  --------------------------------------------------------  IN: 410 mL / OUT: 700 mL / NET: -290 mL      PHYSICAL EXAM:  General: NAD, A/O x 3  ENT: MMM  Neck: Supple, No JVD  Lungs: Clear to auscultation bilaterally, Non labored breathing   Cardio: RRR, S1/S2, No murmurs  Abdomen: Soft, Nontender, Nondistended; Bowel sounds present  Extremities: No calf tenderness, No pitting edema    LABS:                        12.3   11.48 )-----------( 243      ( 2022 06:30 )             38.7         143  |  108  |  29  ----------------------------<  84  4.5   |  24  |  0.98    Ca    8.9      2022 06:30    TPro  7.6  /  Alb  3.3  /  TBili  0.3  /  DBili  x   /  AST  17  /  ALT  37  /  AlkPhos  75      eGFR if Non African American: 56 mL/min/1.73M2 (22 @ 06:30)  eGFR if : 65 mL/min/1.73M2 (22 @ 06:30)                              Urinalysis Basic - ( 2022 17:50 )    Color: Yellow / Appearance: Clear / S.015 / pH: x  Gluc: x / Ketone: Negative  / Bili: Negative / Urobili: Negative   Blood: x / Protein: 30 mg/dL / Nitrite: Positive   Leuk Esterase: Moderate / RBC: 0-4 /HPF / WBC 26-50 /HPF   Sq Epi: x / Non Sq Epi: Neg.-Few / Bacteria: Many /HPF        Culture - Urine (collected 2022 17:50)  Source: Clean Catch Clean Catch (Midstream)  Preliminary Report (2022 17:22):    >100,000 CFU/ml Escherichia coli      COVID-19 PCR: NotDetec (22 @ 19:30)    RADIOLOGY & ADDITIONAL TESTS:    Care Discussed with Consultants/Other Providers:    Patient is a 76y old  Female who presents with a chief complaint of UTI, placement (2022 11:10)      Patient seen and examined at bedside.  Pt with complaint of leg and Knee,  pain also complaint of constipation     ALLERGIES:  epinephrine (Unknown)  norepinephrine (Unknown)    MEDICATIONS  (STANDING):  ALPRAZolam 0.5 milliGRAM(s) Oral two times a day  DULoxetine 20 milliGRAM(s) Oral two times a day  enoxaparin Injectable 40 milliGRAM(s) SubCutaneous every 12 hours  gabapentin 100 milliGRAM(s) Oral three times a day  levothyroxine 50 MICROGram(s) Oral daily  pantoprazole    Tablet 40 milliGRAM(s) Oral before breakfast  polyethylene glycol 3350 17 Gram(s) Oral daily  senna 2 Tablet(s) Oral at bedtime  simvastatin 10 milliGRAM(s) Oral at bedtime    MEDICATIONS  (PRN):  melatonin 3 milliGRAM(s) Oral at bedtime PRN Insomnia  oxycodone    5 mG/acetaminophen 325 mG 1 Tablet(s) Oral every 8 hours PRN Moderate Pain (4 - 6)    Vital Signs Last 24 Hrs  T(F): 98.3 (2022 07:55), Max: 98.6 (2022 16:00)  HR: 77 (2022 07:55) (77 - 91)  BP: 125/74 (2022 07:55) (111/73 - 126/84)  RR: 18 (2022 07:55) (16 - 20)  SpO2: 95% (2022 07:55) (92% - 96%)  I&O's Summary    2022 07:01  -  2022 07:00  --------------------------------------------------------  IN: 410 mL / OUT: 700 mL / NET: -290 mL      PHYSICAL EXAM:  General: 77 y/o female NAD, A/O x 3  ENT: MMM  Neck: Supple, No JVD  Lungs: Clear to auscultation bilaterally, Non labored breathing   Cardio: RRR, S1/S2, No murmurs  Abdomen: Soft, Nontender, Nondistended; Bowel sounds present  Extremities: No calf tenderness, No pitting edema    LABS:                        12.3   11.48 )-----------( 243      ( 2022 06:30 )             38.7         143  |  108  |  29  ----------------------------<  84  4.5   |  24  |  0.98    Ca    8.9      2022 06:30    TPro  7.6  /  Alb  3.3  /  TBili  0.3  /  DBili  x   /  AST  17  /  ALT  37  /  AlkPhos  75      eGFR if Non African American: 56 mL/min/1.73M2 (22 @ 06:30)  eGFR if : 65 mL/min/1.73M2 (22 @ 06:30)                              Urinalysis Basic - ( 2022 17:50 )    Color: Yellow / Appearance: Clear / S.015 / pH: x  Gluc: x / Ketone: Negative  / Bili: Negative / Urobili: Negative   Blood: x / Protein: 30 mg/dL / Nitrite: Positive   Leuk Esterase: Moderate / RBC: 0-4 /HPF / WBC 26-50 /HPF   Sq Epi: x / Non Sq Epi: Neg.-Few / Bacteria: Many /HPF        Culture - Urine (collected 2022 17:50)  Source: Clean Catch Clean Catch (Midstream)  Preliminary Report (2022 17:22):    >100,000 CFU/ml Escherichia coli      COVID-19 PCR: NotDetec (22 @ 19:30)    RADIOLOGY & ADDITIONAL TESTS:    Care Discussed with Consultants/Other Providers:

## 2022-02-07 NOTE — CHART NOTE - NSCHARTNOTEFT_GEN_A_CORE
Notified by lab patient final urine culture growing ESBL E.coli; prelim culture sensitivities had shown sensitivity to CTX.  I called patient and , Dylan [phone], who deny fever, chills, dysuria. Patient states she feels well.   Due to lack of symptoms, do not feel strongly to change therapy. Will notify PMD Dr. Reyes who patient will visit 2/15.   Instructed patient and  to seek medical attention if any change in status.

## 2022-02-08 ENCOUNTER — NON-APPOINTMENT (OUTPATIENT)
Age: 77
End: 2022-02-08

## 2022-02-10 NOTE — ED PROVIDER NOTE - CPE EDP PSYCH NORM
Patient experiencing occasional tightening of belly, but nothing that is uncomfortable.  Good FM.  No LOF or VB.  Weight gain appropriate  
normal...

## 2022-02-15 ENCOUNTER — APPOINTMENT (OUTPATIENT)
Dept: INTERNAL MEDICINE | Facility: CLINIC | Age: 77
End: 2022-02-15

## 2022-03-09 ENCOUNTER — APPOINTMENT (OUTPATIENT)
Dept: RHEUMATOLOGY | Facility: CLINIC | Age: 77
End: 2022-03-09

## 2022-03-14 NOTE — ED ADULT TRIAGE NOTE - NSPATIENTFLAG_GEN_A_ER
Kim with [facility] called to verify if results received by our office for [place] Mo.   I did verify results were reeived and also reviewed by provider on 3/13/22 Purple DH (Discharge Huddle; Vulnerable Patient)

## 2022-03-29 PROCEDURE — 99443: CPT | Mod: 95

## 2022-04-18 ENCOUNTER — EMERGENCY (EMERGENCY)
Facility: HOSPITAL | Age: 77
LOS: 1 days | Discharge: HOME | End: 2022-04-18
Attending: EMERGENCY MEDICINE | Admitting: EMERGENCY MEDICINE
Payer: MEDICARE

## 2022-04-18 VITALS
SYSTOLIC BLOOD PRESSURE: 156 MMHG | HEIGHT: 60 IN | RESPIRATION RATE: 16 BRPM | HEART RATE: 112 BPM | DIASTOLIC BLOOD PRESSURE: 97 MMHG | OXYGEN SATURATION: 98 %

## 2022-04-18 DIAGNOSIS — Z98.891 HISTORY OF UTERINE SCAR FROM PREVIOUS SURGERY: Chronic | ICD-10-CM

## 2022-04-18 LAB
ALBUMIN SERPL ELPH-MCNC: 3.7 G/DL — SIGNIFICANT CHANGE UP (ref 3.3–5)
ALP SERPL-CCNC: 95 U/L — SIGNIFICANT CHANGE UP (ref 40–120)
ALT FLD-CCNC: 110 U/L — HIGH (ref 10–45)
ANION GAP SERPL CALC-SCNC: 11 MMOL/L — SIGNIFICANT CHANGE UP (ref 5–17)
APPEARANCE UR: CLEAR — SIGNIFICANT CHANGE UP
AST SERPL-CCNC: 32 U/L — SIGNIFICANT CHANGE UP (ref 10–40)
BACTERIA # UR AUTO: ABNORMAL /HPF
BASOPHILS # BLD AUTO: 0.07 K/UL — SIGNIFICANT CHANGE UP (ref 0–0.2)
BASOPHILS NFR BLD AUTO: 0.5 % — SIGNIFICANT CHANGE UP (ref 0–2)
BILIRUB SERPL-MCNC: 0.2 MG/DL — SIGNIFICANT CHANGE UP (ref 0.2–1.2)
BILIRUB UR-MCNC: NEGATIVE — SIGNIFICANT CHANGE UP
BUN SERPL-MCNC: 35 MG/DL — HIGH (ref 7–23)
CALCIUM SERPL-MCNC: 9.5 MG/DL — SIGNIFICANT CHANGE UP (ref 8.4–10.5)
CHLORIDE SERPL-SCNC: 104 MMOL/L — SIGNIFICANT CHANGE UP (ref 96–108)
CO2 SERPL-SCNC: 26 MMOL/L — SIGNIFICANT CHANGE UP (ref 22–31)
COLOR SPEC: YELLOW — SIGNIFICANT CHANGE UP
CREAT SERPL-MCNC: 1.25 MG/DL — SIGNIFICANT CHANGE UP (ref 0.5–1.3)
DIFF PNL FLD: NEGATIVE — SIGNIFICANT CHANGE UP
EGFR: 45 ML/MIN/1.73M2 — LOW
EOSINOPHIL # BLD AUTO: 0.07 K/UL — SIGNIFICANT CHANGE UP (ref 0–0.5)
EOSINOPHIL NFR BLD AUTO: 0.5 % — SIGNIFICANT CHANGE UP (ref 0–6)
EPI CELLS # UR: SIGNIFICANT CHANGE UP
GLUCOSE SERPL-MCNC: 115 MG/DL — HIGH (ref 70–99)
GLUCOSE UR QL: NEGATIVE — SIGNIFICANT CHANGE UP
HCT VFR BLD CALC: 43.3 % — SIGNIFICANT CHANGE UP (ref 34.5–45)
HGB BLD-MCNC: 14.1 G/DL — SIGNIFICANT CHANGE UP (ref 11.5–15.5)
IMM GRANULOCYTES NFR BLD AUTO: 0.9 % — SIGNIFICANT CHANGE UP (ref 0–1.5)
KETONES UR-MCNC: NEGATIVE — SIGNIFICANT CHANGE UP
LACTATE SERPL-SCNC: 2 MMOL/L — SIGNIFICANT CHANGE UP (ref 0.7–2)
LEUKOCYTE ESTERASE UR-ACNC: ABNORMAL
LYMPHOCYTES # BLD AUTO: 15.3 % — SIGNIFICANT CHANGE UP (ref 13–44)
LYMPHOCYTES # BLD AUTO: 2.33 K/UL — SIGNIFICANT CHANGE UP (ref 1–3.3)
MCHC RBC-ENTMCNC: 29.8 PG — SIGNIFICANT CHANGE UP (ref 27–34)
MCHC RBC-ENTMCNC: 32.6 GM/DL — SIGNIFICANT CHANGE UP (ref 32–36)
MCV RBC AUTO: 91.5 FL — SIGNIFICANT CHANGE UP (ref 80–100)
MONOCYTES # BLD AUTO: 0.92 K/UL — HIGH (ref 0–0.9)
MONOCYTES NFR BLD AUTO: 6 % — SIGNIFICANT CHANGE UP (ref 2–14)
NEUTROPHILS # BLD AUTO: 11.71 K/UL — HIGH (ref 1.8–7.4)
NEUTROPHILS NFR BLD AUTO: 76.8 % — SIGNIFICANT CHANGE UP (ref 43–77)
NITRITE UR-MCNC: POSITIVE
NRBC # BLD: 0 /100 WBCS — SIGNIFICANT CHANGE UP (ref 0–0)
PH UR: 6 — SIGNIFICANT CHANGE UP (ref 5–8)
PLATELET # BLD AUTO: 305 K/UL — SIGNIFICANT CHANGE UP (ref 150–400)
POTASSIUM SERPL-MCNC: 4.6 MMOL/L — SIGNIFICANT CHANGE UP (ref 3.5–5.3)
POTASSIUM SERPL-SCNC: 4.6 MMOL/L — SIGNIFICANT CHANGE UP (ref 3.5–5.3)
PROT SERPL-MCNC: 7.7 G/DL — SIGNIFICANT CHANGE UP (ref 6–8.3)
PROT UR-MCNC: 30 MG/DL
RBC # BLD: 4.73 M/UL — SIGNIFICANT CHANGE UP (ref 3.8–5.2)
RBC # FLD: 15.1 % — HIGH (ref 10.3–14.5)
RBC CASTS # UR COMP ASSIST: NEGATIVE /HPF — SIGNIFICANT CHANGE UP (ref 0–4)
SARS-COV-2 RNA SPEC QL NAA+PROBE: SIGNIFICANT CHANGE UP
SODIUM SERPL-SCNC: 141 MMOL/L — SIGNIFICANT CHANGE UP (ref 135–145)
SP GR SPEC: 1.01 — SIGNIFICANT CHANGE UP (ref 1.01–1.02)
UROBILINOGEN FLD QL: NEGATIVE — SIGNIFICANT CHANGE UP
WBC # BLD: 15.23 K/UL — HIGH (ref 3.8–10.5)
WBC # FLD AUTO: 15.23 K/UL — HIGH (ref 3.8–10.5)
WBC UR QL: ABNORMAL /HPF (ref 0–5)

## 2022-04-18 PROCEDURE — 99284 EMERGENCY DEPT VISIT MOD MDM: CPT

## 2022-04-18 PROCEDURE — 74176 CT ABD & PELVIS W/O CONTRAST: CPT | Mod: 26,MA

## 2022-04-18 PROCEDURE — 93010 ELECTROCARDIOGRAM REPORT: CPT

## 2022-04-18 PROCEDURE — 71045 X-RAY EXAM CHEST 1 VIEW: CPT | Mod: 26

## 2022-04-18 RX ORDER — MORPHINE SULFATE 50 MG/1
2 CAPSULE, EXTENDED RELEASE ORAL ONCE
Refills: 0 | Status: DISCONTINUED | OUTPATIENT
Start: 2022-04-18 | End: 2022-04-18

## 2022-04-18 RX ORDER — CEFTRIAXONE 500 MG/1
1000 INJECTION, POWDER, FOR SOLUTION INTRAMUSCULAR; INTRAVENOUS ONCE
Refills: 0 | Status: COMPLETED | OUTPATIENT
Start: 2022-04-18 | End: 2022-04-18

## 2022-04-18 RX ORDER — SODIUM CHLORIDE 9 MG/ML
500 INJECTION INTRAMUSCULAR; INTRAVENOUS; SUBCUTANEOUS ONCE
Refills: 0 | Status: COMPLETED | OUTPATIENT
Start: 2022-04-18 | End: 2022-04-18

## 2022-04-18 RX ADMIN — CEFTRIAXONE 1000 MILLIGRAM(S): 500 INJECTION, POWDER, FOR SOLUTION INTRAMUSCULAR; INTRAVENOUS at 23:55

## 2022-04-18 RX ADMIN — MORPHINE SULFATE 2 MILLIGRAM(S): 50 CAPSULE, EXTENDED RELEASE ORAL at 22:37

## 2022-04-18 RX ADMIN — SODIUM CHLORIDE 1000 MILLILITER(S): 9 INJECTION INTRAMUSCULAR; INTRAVENOUS; SUBCUTANEOUS at 22:37

## 2022-04-18 RX ADMIN — CEFTRIAXONE 100 MILLIGRAM(S): 500 INJECTION, POWDER, FOR SOLUTION INTRAMUSCULAR; INTRAVENOUS at 23:16

## 2022-04-18 NOTE — ED ADULT NURSE NOTE - OBJECTIVE STATEMENT
Patient comes to ER for dysuria, frequency, and urgency. Reports history of UTIs that occur several times a year. Reports no documented fever but episode of sweating last night. No nausea, vomiting, or diarrhea. No chest pain or shortness of breath. Non-labored and even respirations noted.

## 2022-04-18 NOTE — ED ADULT TRIAGE NOTE - INTERNATIONAL TRAVEL
Ongoing SW/CM Assessment/Plan of Care Note     See SW/CM flowsheets for goals and other objective data.    Patient/Family discharge goal (s):  Goal #1: Psychosocial needs assessed  Goal #2: Communication facilitated  Goal #3: Home Care arranged or issues addressed    PT Recommendation:  Recommendation for Discharge: PT WI: Home therapy       OT Recommendation:  Recommendations for Discharge: OT WI: Post acute therapy    Disposition:  Planned Discharge Destination: Home/apartment with Spouse/SO    Progress note:   Chart reviewed and case discussed this date in OFTs. SW aware that pt having procedure this date. SW will continue to follow for further discharge planning as appropriate. Therapy continuing to recommend home with home therapy. SW to follow.          No

## 2022-04-18 NOTE — ED PROVIDER NOTE - PATIENT PORTAL LINK FT
You can access the FollowMyHealth Patient Portal offered by [facility] by registering at the following website: [email or web address]. By joining [employer]’s FollowMyHealth portal, you will also be able to view your health information using other applications (apps) compatible with our system.

## 2022-04-18 NOTE — ED PROVIDER NOTE - CLINICAL SUMMARY MEDICAL DECISION MAKING FREE TEXT BOX
76F presents to the ED for dysuria and abd pain. PMH of HLD, hypothyroid, peripheral neuropathy, diffuse osteoarthritis, anxiety, UTI Citrobacter and Klebsiella oxytoca recently admitted for acute UTI and change in behavior. Pt states her  called 911 because she began having symptoms of UTI and he felt she needed to go to the hospital before it got worst. No change in mental status. No known fever. Pt states she has been feeling weaker than usual. She uses a walker to ambulate however, has been using her wheelchair. She cannot make it to the bathroom in time due to increased urinary frequency. Therefore, she is now wearing Depends. No sensory change. 76F presents to the ED for dysuria and abd pain. PMH of HLD, hypothyroid, peripheral neuropathy, diffuse osteoarthritis, anxiety, UTI Citrobacter and Klebsiella oxytoca recently admitted for acute UTI and change in behavior. Pt states her  called 911 because she began having symptoms of UTI and he felt she needed to go to the hospital before it got worst. No change in mental status. No known fever. Pt states she has been feeling weaker than usual. She uses a walker to ambulate however, has been using her wheelchair. She cannot make it to the bathroom in time due to increased urinary frequency. Therefore, she is now wearing Depends. No sensory change.    Called . She uses the wheelchair mainly. They have 24 hr care. He has no concern for increased aggression. Pt results reviewed. + UTI. No other concerns. Pt happy to go home. Strict return precautions discussed.

## 2022-04-18 NOTE — ED PROVIDER NOTE - OBJECTIVE STATEMENT
76F presents to the ED for dysuria and abd pain. PMH of HLD, hypothyroid, peripheral neuropathy, diffuse osteoarthritis, anxiety, UTI Citrobacter and Klebsiella oxytoca recently admitted for acute UTI and change in behavior. Pt states her  called 911 because she began having symptoms of UTI and he felt she needed to go to the hospital before it got worst. No change in mental status. No known fever. Pt states she has been feeling weaker than usual. She uses a walker to ambulate however, has been using her wheelchair. She cannot make it to the bathroom in time due to increased urinary frequency. Therefore, she is now wearing Depends. No sensory change.

## 2022-04-18 NOTE — ED ADULT NURSE NOTE - NSIMPLEMENTINTERV_GEN_ALL_ED
Implemented All Universal Safety Interventions:  [place] to call system. Call bell, personal items and telephone within reach. Instruct patient to call for assistance. Room bathroom lighting operational. Non-slip footwear when patient is off stretcher. Physically safe environment: no spills, clutter or unnecessary equipment. Stretcher in lowest position, wheels locked, appropriate side rails in place.

## 2022-04-18 NOTE — ED ADULT TRIAGE NOTE - CHIEF COMPLAINT QUOTE
urinary frequency and dysuria, as per  pt gets UTI's frequently urinary frequency and dysuria, as per  pt gets UTI's frequently. Pt also c/o back and supra pubic pain. A&ox4

## 2022-04-18 NOTE — ED ADULT NURSE REASSESSMENT NOTE - NS ED NURSE REASSESS COMMENT FT1
Rectal temp obtained, 98.6. EKG performed. PIV placed in left hand and bloods were drawn and sent to lab. IVF and morphine administered. x1 attempt made for straight cath for urine, unsuccessful. Second RN attempted and successful. Urine sample obtained and sent to lab. CXR performed, covid swab obtained and sent. Pending CT.

## 2022-04-18 NOTE — ED PROVIDER NOTE - MUSCULOSKELETAL, MLM
The sheath is removed from the right femoral artery.  Spine appears normal, range of motion is not limited, no muscle or joint tenderness

## 2022-04-19 VITALS
TEMPERATURE: 98 F | RESPIRATION RATE: 15 BRPM | OXYGEN SATURATION: 95 % | HEART RATE: 72 BPM | DIASTOLIC BLOOD PRESSURE: 77 MMHG | SYSTOLIC BLOOD PRESSURE: 117 MMHG

## 2022-04-19 PROCEDURE — 87635 SARS-COV-2 COVID-19 AMP PRB: CPT

## 2022-04-19 PROCEDURE — 80053 COMPREHEN METABOLIC PANEL: CPT

## 2022-04-19 PROCEDURE — 74176 CT ABD & PELVIS W/O CONTRAST: CPT | Mod: MA

## 2022-04-19 PROCEDURE — 96365 THER/PROPH/DIAG IV INF INIT: CPT

## 2022-04-19 PROCEDURE — 99285 EMERGENCY DEPT VISIT HI MDM: CPT | Mod: 25

## 2022-04-19 PROCEDURE — 85025 COMPLETE CBC W/AUTO DIFF WBC: CPT

## 2022-04-19 PROCEDURE — 36415 COLL VENOUS BLD VENIPUNCTURE: CPT

## 2022-04-19 PROCEDURE — 87086 URINE CULTURE/COLONY COUNT: CPT

## 2022-04-19 PROCEDURE — 96375 TX/PRO/DX INJ NEW DRUG ADDON: CPT

## 2022-04-19 PROCEDURE — 81001 URINALYSIS AUTO W/SCOPE: CPT

## 2022-04-19 PROCEDURE — 83605 ASSAY OF LACTIC ACID: CPT

## 2022-04-19 PROCEDURE — 96361 HYDRATE IV INFUSION ADD-ON: CPT

## 2022-04-19 PROCEDURE — 93005 ELECTROCARDIOGRAM TRACING: CPT

## 2022-04-19 PROCEDURE — 87186 SC STD MICRODIL/AGAR DIL: CPT

## 2022-04-19 PROCEDURE — 71045 X-RAY EXAM CHEST 1 VIEW: CPT

## 2022-04-19 RX ORDER — CEFUROXIME AXETIL 250 MG
1 TABLET ORAL
Qty: 14 | Refills: 0
Start: 2022-04-19 | End: 2022-04-25

## 2022-04-19 RX ADMIN — SODIUM CHLORIDE 500 MILLILITER(S): 9 INJECTION INTRAMUSCULAR; INTRAVENOUS; SUBCUTANEOUS at 00:15

## 2022-04-19 RX ADMIN — MORPHINE SULFATE 2 MILLIGRAM(S): 50 CAPSULE, EXTENDED RELEASE ORAL at 00:14

## 2022-04-19 NOTE — ED ADULT NURSE REASSESSMENT NOTE - NS ED NURSE REASSESS COMMENT FT1
Patient resting comfortably in bed and in NAD. Currently sleeping and appears to be pain free. CT imaging completed. IVF and antibiotics completed. Awaiting results.

## 2022-04-20 NOTE — ED POST DISCHARGE NOTE - DETAILS
+urine culture, pt d/c on cef, resistant UCx checked, Macrobid sent to pharmacy, voicemail left for call back.

## 2022-04-22 LAB
-  AMIKACIN: SIGNIFICANT CHANGE UP
-  AMOXICILLIN/CLAVULANIC ACID: SIGNIFICANT CHANGE UP
-  AMPICILLIN/SULBACTAM: SIGNIFICANT CHANGE UP
-  AMPICILLIN: SIGNIFICANT CHANGE UP
-  AZTREONAM: SIGNIFICANT CHANGE UP
-  CEFAZOLIN: SIGNIFICANT CHANGE UP
-  CEFEPIME: SIGNIFICANT CHANGE UP
-  CEFTRIAXONE: SIGNIFICANT CHANGE UP
-  CIPROFLOXACIN: SIGNIFICANT CHANGE UP
-  ERTAPENEM: SIGNIFICANT CHANGE UP
-  GENTAMICIN: SIGNIFICANT CHANGE UP
-  IMIPENEM: SIGNIFICANT CHANGE UP
-  LEVOFLOXACIN: SIGNIFICANT CHANGE UP
-  MEROPENEM: SIGNIFICANT CHANGE UP
-  NITROFURANTOIN: SIGNIFICANT CHANGE UP
-  PIPERACILLIN/TAZOBACTAM: SIGNIFICANT CHANGE UP
-  TIGECYCLINE: SIGNIFICANT CHANGE UP
-  TOBRAMYCIN: SIGNIFICANT CHANGE UP
-  TRIMETHOPRIM/SULFAMETHOXAZOLE: SIGNIFICANT CHANGE UP
CULTURE RESULTS: SIGNIFICANT CHANGE UP
METHOD TYPE: SIGNIFICANT CHANGE UP
ORGANISM # SPEC MICROSCOPIC CNT: SIGNIFICANT CHANGE UP
ORGANISM # SPEC MICROSCOPIC CNT: SIGNIFICANT CHANGE UP
SPECIMEN SOURCE: SIGNIFICANT CHANGE UP

## 2022-04-25 RX ORDER — NITROFURANTOIN MACROCRYSTAL 50 MG
1 CAPSULE ORAL
Qty: 20 | Refills: 0
Start: 2022-04-25 | End: 2022-05-04

## 2022-04-26 RX ORDER — NITROFURANTOIN MACROCRYSTAL 50 MG
1 CAPSULE ORAL
Qty: 14 | Refills: 0
Start: 2022-04-26 | End: 2022-05-02

## 2022-04-28 NOTE — CHART NOTE - NSCHARTNOTEFT_GEN_A_CORE
SW placed call to patient to discuss and assist with follow up care.  Patient presented to ED on 4/18/22 for urinary symptoms.  Patient did not answer.  Phone directly to . No appts shown in HIE.

## 2022-07-11 DIAGNOSIS — E66.9 OBESITY, UNSPECIFIED: ICD-10-CM

## 2022-07-11 RX ORDER — SEMAGLUTIDE 0.25 MG/.5ML
0.25 INJECTION, SOLUTION SUBCUTANEOUS
Qty: 1 | Refills: 1 | Status: ACTIVE | COMMUNITY
Start: 2022-07-11 | End: 1900-01-01

## 2022-09-16 ENCOUNTER — RX RENEWAL (OUTPATIENT)
Age: 77
End: 2022-09-16

## 2022-09-16 RX ORDER — QUETIAPINE FUMARATE 100 MG/1
100 TABLET ORAL
Qty: 180 | Refills: 2 | Status: ACTIVE | COMMUNITY
Start: 2021-12-21 | End: 1900-01-01

## 2022-09-30 ENCOUNTER — RX RENEWAL (OUTPATIENT)
Age: 77
End: 2022-09-30

## 2022-09-30 RX ORDER — GABAPENTIN 100 MG/1
100 CAPSULE ORAL
Qty: 270 | Refills: 3 | Status: ACTIVE | COMMUNITY
Start: 2021-10-07 | End: 1900-01-01

## 2022-11-28 ENCOUNTER — RX RENEWAL (OUTPATIENT)
Age: 77
End: 2022-11-28

## 2022-11-28 RX ORDER — DULOXETINE HYDROCHLORIDE 30 MG/1
30 CAPSULE, DELAYED RELEASE PELLETS ORAL
Qty: 180 | Refills: 3 | Status: ACTIVE | COMMUNITY
Start: 2021-10-07 | End: 1900-01-01

## 2022-11-30 ENCOUNTER — RX RENEWAL (OUTPATIENT)
Age: 77
End: 2022-11-30

## 2022-11-30 RX ORDER — LEVOTHYROXINE SODIUM 0.05 MG/1
50 TABLET ORAL DAILY
Qty: 90 | Refills: 3 | Status: ACTIVE | COMMUNITY
Start: 2021-12-21 | End: 1900-01-01

## 2022-12-02 NOTE — ED BEHAVIORAL HEALTH ASSESSMENT NOTE - MUSCLE TONE / STRENGTH
What Type Of Note Output Would You Prefer (Optional)?: Bullet Format
What Is The Reason For Today's Visit?: Full Body Skin Examination
Normal muscle tone/strength

## 2023-01-13 NOTE — ED ADULT TRIAGE NOTE - CHIEF COMPLAINT QUOTE
Location of patient: [place]    Subjective: Caller states \"I have a concern for my labia. Went to the Dr and was told they were going to test for HSV caller wanted to know what her results meant from her chart. Was advised to follow up with PCP about HSV. \"       Reason for Disposition   [1] Caller requesting NON-URGENT health information AND [2] PCP's office is the best resource    Protocols used:  Information Only Call - No Triage-ADULT-
Pt BIBA from home for possible UTI, pt is bed bound, incontinent with bed sore, ISAR positive, declines HIV test

## 2023-03-03 RX ORDER — ALPRAZOLAM 0.5 MG/1
0.5 TABLET ORAL
Qty: 60 | Refills: 0 | Status: ACTIVE | COMMUNITY
Start: 2021-06-24 | End: 1900-01-01

## 2023-03-03 RX ORDER — OXYCODONE AND ACETAMINOPHEN 5; 325 MG/1; MG/1
5-325 TABLET ORAL EVERY 6 HOURS
Qty: 120 | Refills: 0 | Status: ACTIVE | COMMUNITY
Start: 2021-04-13 | End: 1900-01-01

## 2023-03-24 NOTE — ED ADULT NURSE NOTE - CAS EDN DISCHARGE ASSESSMENT
PT DAILY TREATMENT NOTE    Patient Name: Johny Bey  Date:3/24/2023  : 1965  [x]  Patient  Verified  Payor: [employer] MEDICAID / Plan: [facility] / Product Type: Managed Care Medicaid /    Total Treatment Time (min): 40  Total Timed Codes (min): 40   Referring Physician: Maciej Evangelista    Treatment Area: Right neck/upper extremity    SUBJECTIVE  Reports feeling good today    OBJECTIVE  Modality:   []  E-Stim: type _ x _ min     []att   []unatt   []w/US   []w/ice   []w/heat  []  Ultrasound: []cont   []pulse    _ W/cm2 x _  min   []1MHz   []3MHz  []  Ice pack _  min       []  Hot pack 10  min       []  Other:     Therapeutic Exercise: (minutes: 25)  [x] see exercise log      Added/Changed Exercises:  [x]  Added: flys    []  Changed:       Manual Therapy: (minutes: 15)  Cervical traction, contract relax for left cervical rotation. MFR/STM/manual stretching to right upper trap      Patient Education: [x] Review HEP    [] Progressed/Changed HEP:      Other Objective/Functional Measures:     ASSESSMENT  []  See Plan of Care  []  See progress note/recertification  [x]  Patient will continue to benefit from skilled therapy to address remaining functional deficits:     Remains restricted with left cervical rotation but overall pain levels have been more tolerable. A bit challenged with postural strengthening today. Follow again next week      ICD-10-CM ICD-9-CM    1. Neck pain  M54.2 723.1       2.  Right cervical radiculopathy  M54.12 723.4           PLAN  [x] Progress as tolerated under current plan towards long-term goals  [] Discharge  [] Other:      PT Exercise Log         Activity/Exercise Date  23      Corner stretch x     T-spine chair extension x      Bilateral ER x     Rows  x     Lat pulldown x     Chin tucks x     DAP x     Flys  x                                                                 NV Self Representation Document Preparation, PT 3/24/2023  9:29 AM Alert and oriented to person, place and time

## 2023-05-13 NOTE — ED ADULT NURSE NOTE - HOW OFTEN DO YOU HAVE A DRINK CONTAINING ALCOHOL?
Never
36yo P0     @ 39w 5 d      presents c/o contractions, denies LOF or VB has + FM      PNC: Dr Caballero  PNI: uncomplicated  PNL: GBS negative    All: No Known Allergies  Meds: PNV  PMHx: Denies  PSHx: Denies  Socialhx: Denies x 3  OBhx: Primigravid  GYNhx: Denies fibroids, ov cysts or STDs, h/o HPV, last pap normal per patient    T(C): 36.5 (05-13-23 @ 09:53), Max: 36.5 (05-13-23 @ 09:50)  HR: 75 (05-13-23 @ 10:49) (63 - 84)  BP: 131/61 (05-13-23 @ 09:53) (131/61 - 131/61)  RR: 17 (05-13-23 @ 09:53) (17 - 17)  SpO2: 97% (05-13-23 @ 10:49) (92% - 97%)    Gen: NAD  Heart: RRR  Lungs: CTA B/L  Abdomen: Gravid, NT  Ext: no calf tenderness    NST: 135 moderate variabity + accels no decels  TOCO: irregular  VE: 5/80/-3  EFW: 3600  BSUS:  vtx

## 2023-10-06 NOTE — DISCHARGE NOTE PROVIDER - NSDCHHBASESERVICE_GEN_ALL_CORE
Pt no issues with dysphagia screening and was able to swallow ice chips   Nursing/Occupational therapy/Physical therapy

## 2024-01-31 NOTE — PATIENT PROFILE ADULT - NSTOBACCOQUITATTEMPT_GEN_A_CORE_SD
Anesthesia Evaluation     no history of anesthetic complications:   NPO Solid Status: > 8 hours  NPO Liquid Status: > 8 hours           Airway   Mallampati: II  TM distance: >3 FB  Neck ROM: full  No difficulty expected  Dental - normal exam     Pulmonary     breath sounds clear to auscultation  (+) a smoker Former,shortness of breath, sleep apnea (no cpap)  Cardiovascular     Rhythm: regular  Rate: normal    (+) hypertension, CAD, hyperlipidemia      Neuro/Psych  GI/Hepatic/Renal/Endo    (+) obesity, morbid obesity, GERD, renal disease-, diabetes mellitus    Musculoskeletal     Abdominal     Abdomen: soft.  Bowel sounds: normal.   Substance History      OB/GYN          Other   arthritis,                       Anesthesia Plan    ASA 3     general     intravenous induction     Anesthetic plan, risks, benefits, and alternatives have been provided, discussed and informed consent has been obtained with: patient.    Plan discussed with CRNA.      
quit on own

## 2024-02-27 NOTE — ED PROVIDER NOTE - PSYCHIATRIC LEVEL OF CONSCIOUSNESS
History Of Present Illness  Emiliano Mann is a 67 y.o. male presenting with history of loss of vision in the left eye for last week or 2.  According to the patient 1 day when he was driving he could not read the board and when he covered the left eye was able to read.  He started with some field defect in the left lower quadrant.  He had multiple spells like this and when he went and saw an optometrist last took him to the hospital for further workup    At the time of my evaluation he denies any headache nausea vomiting or any loss of consciousness or any visual difficulty    He has a longstanding history of hypertension is currently on medication for that    He retired about a year ago and denies any smoking alcohol or any substance abuse    Please refer to the initial H&P and the ER records for details.      Past Medical History  Past Medical History:   Diagnosis Date    Personal history of other diseases of the circulatory system 10/20/2022    History of aortic valve stenosis     Surgical History  Past Surgical History:   Procedure Laterality Date    OTHER SURGICAL HISTORY  10/27/2020    Cholecystectomy    OTHER SURGICAL HISTORY  10/27/2020    Cervical laminectomy    OTHER SURGICAL HISTORY  10/27/2020    Colonoscopy    OTHER SURGICAL HISTORY  2022    Percutaneous transluminal coronary angioplasty    OTHER SURGICAL HISTORY  2022    Cardiac catheterization with stent placement    OTHER SURGICAL HISTORY  2022    Tonsillectomy with adenoidectomy     Social History  Social History     Tobacco Use    Smoking status: Former     Types: Cigarettes     Quit date:      Years since quittin.    Smokeless tobacco: Never   Substance Use Topics    Alcohol use: Yes     Alcohol/week: 1.0 standard drink of alcohol     Types: 1 Shots of liquor per week    Drug use: Never     Allergies  Clopidogrel, Ezetimibe, Lisinopril, Losartan potassium, Morphine, Pravachol [pravastatin], and Statins-hmg-coa reductase  inhibitors  Home Medications  Medications Prior to Admission   Medication Sig Dispense Refill Last Dose    ACID REDUCER, CIMETIDINE, ORAL Take 1 tablet by mouth once daily as needed.   Unknown    amLODIPine (Norvasc) 10 mg tablet Take 1 tablet (10 mg) by mouth once daily. 90 tablet 3 2/27/2024 at am    amoxicillin (Amoxil) 500 mg capsule Take all four capsules 30-60 minutes before dental procedure for a total of 2g. 4 capsule 0     ASCORBIC ACID, VITAMIN C, ORAL Take 1 tablet by mouth 3 times a week.   Unknown    aspirin 81 mg EC tablet Take 1 tablet (81 mg) by mouth 3 times a week.   Unknown    calcium carbonate EX (Tums Extra Strength) 300 mg (750 mg) chewable tablet Chew 300 mg 3 times a day as needed for indigestion or heartburn.   2/26/2024    carvedilol (Coreg) 25 mg tablet Take 1 tablet (25 mg) by mouth 2 times a day with meals.   2/27/2024 at am    CHOLECALCIFEROL, VITAMIN D3, ORAL Take by mouth 3 times a week.   Unknown    multivitamin tablet Take 1 tablet by mouth 3 times a week.   Unknown    naproxen (Naprosyn) 500 mg tablet Take 1 tablet (500 mg) by mouth every 12 hours if needed for mild pain (1 - 3).   Not Recent    nitroglycerin (Nitrostat) 0.4 mg SL tablet Place 1 tablet (0.4 mg) under the tongue every 5 minutes if needed for chest pain. DISSOLVE 1 TABLET UNDER THE TONGUE AS NEEDED FOR CHEST PAIN- MAY REPEAT EVERY 5 MINUTES IF NEEDED (MAX 3 DOSES.- IF NO RELIEF CALL 911)   Unknown       Review of Systems   Constitutional:  Negative for fatigue, fever and unexpected weight change.   HENT:  Negative for dental problem, ear pain, hearing loss, sinus pressure, tinnitus and trouble swallowing.    Eyes:  Positive for visual disturbance. Negative for photophobia and pain.   Respiratory:  Negative for cough, shortness of breath and wheezing.    Cardiovascular:  Negative for chest pain, palpitations and leg swelling.   Gastrointestinal:  Negative for abdominal pain, nausea and vomiting.   Genitourinary:   Negative for difficulty urinating, enuresis and frequency.   Musculoskeletal:  Negative for arthralgias, back pain, joint swelling, neck pain and neck stiffness.   Skin:  Negative for pallor and rash.   Allergic/Immunologic: Negative for food allergies.   Neurological:  Negative for dizziness, tremors, seizures, syncope, facial asymmetry, speech difficulty, weakness, light-headedness, numbness and headaches.   Hematological:  Negative for adenopathy. Does not bruise/bleed easily.   Psychiatric/Behavioral:  Negative for agitation, behavioral problems, confusion, hallucinations and sleep disturbance. The patient is not hyperactive.        Physical Exam  Neurological Exam  Constitutional: General appearance: no acute distress   Auscultation of Heart: Regular rate and rhythm, no murmurs, normal S1 and S2.   Carotid Arteries: Intact without any bruits.   Neck is supple.   No lymph adenopathy.   Peripheral Vascular Exam: Pulses +2 and equal in all extremities. No swelling, varicosities, edema or tenderness to palpations.    Abdomen is soft, nondistended. No organomegaly.  Mental status: The patient was in no distress, alert, interactive and cooperative. Affect is appropriate.   Orientation: oriented to person, oriented to place and oriented to time.   Memory: recent memory intact and remote memory intact.   Attention: normal attention span and normal concentrating ability.   Language: normal comprehension and no difficulty naming common objects.   Fund of knowledge: Patient displays adequate knowledge of current events, adequate fund of knowledge regarding past history and adequate fund of knowledge regarding vocabulary.   Eyes: The ophthalmoscopic examination was normal. The fundi are visualized with normal disc margins and without.  Cranial nerve II: Visual fields full to confrontation.   Cranial nerves III, IV, and VI: Pupils round, equally reactive to light; no ptosis. EOMs intact. No nystagmus.   Cranial Nerve V:  "Facial sensation intact bilaterally.   Cranial nerve VII: Normal and symmetric facial strength.   Cranial nerve VIII: Hearing is intact bilaterally to finger rub / whisper.   Cranial nerves IX and X: Palate elevates symmetrically.   Cranial nerve XI: Shoulder shrug and neck rotation strength are intact.   Cranial nerve XII: Tongue midline with normal strength.   Motor: Motor exam was normal. Muscle bulk was normal in both upper and lower extremities. Muscle tone was normal in both upper and lower extremities. Muscle strength was 5/5 throughout. no abnormal or adventitious movements were present.   Deep Tendon Reflexes: left biceps 2+ , right biceps 2+, left triceps 2+, right triceps 2+, left brachioradialis 2+, right brachioradialis 2+, left patella 2+, right patella 2+, left ankle jerk 2+, right ankle jerk 2+   Plantar Reflex: Toes downgoing to plantar stimulation on the left. Toes downgoing to plantar stimulation on the right.   Sensory Exam: Normal to light touch.   Coordination: There is no limb dystaxia and rapid alternating movements are intact.  Gait: Gait is normal without spasticity, ataxia or bradykinesia. Stance is stable with a negative Romberg.    Last Recorded Vitals  Blood pressure (!) 169/102, pulse 75, temperature 36.8 °C (98.2 °F), temperature source Temporal, resp. rate 18, height 1.727 m (5' 8\"), weight 97.3 kg (214 lb 8.1 oz), SpO2 94 %.      Relevant Results  Recent Results (from the past 24 hour(s))   CBC and Auto Differential    Collection Time: 02/27/24  7:33 AM   Result Value Ref Range    WBC 9.8 4.4 - 11.3 x10*3/uL    nRBC 0.0 0.0 - 0.0 /100 WBCs    RBC 4.96 4.50 - 5.90 x10*6/uL    Hemoglobin 15.4 13.5 - 17.5 g/dL    Hematocrit 43.6 41.0 - 52.0 %    MCV 88 80 - 100 fL    MCH 31.0 26.0 - 34.0 pg    MCHC 35.3 32.0 - 36.0 g/dL    RDW 11.8 11.5 - 14.5 %    Platelets 230 150 - 450 x10*3/uL    Neutrophils % 70.9 40.0 - 80.0 %    Immature Granulocytes %, Automated 0.4 0.0 - 0.9 %    Lymphocytes % " 18.8 13.0 - 44.0 %    Monocytes % 6.2 2.0 - 10.0 %    Eosinophils % 3.3 0.0 - 6.0 %    Basophils % 0.4 0.0 - 2.0 %    Neutrophils Absolute 6.92 1.20 - 7.70 x10*3/uL    Immature Granulocytes Absolute, Automated 0.04 0.00 - 0.70 x10*3/uL    Lymphocytes Absolute 1.84 1.20 - 4.80 x10*3/uL    Monocytes Absolute 0.61 0.10 - 1.00 x10*3/uL    Eosinophils Absolute 0.32 0.00 - 0.70 x10*3/uL    Basophils Absolute 0.04 0.00 - 0.10 x10*3/uL   Basic metabolic panel    Collection Time: 02/27/24  7:33 AM   Result Value Ref Range    Glucose 154 (H) 74 - 99 mg/dL    Sodium 136 136 - 145 mmol/L    Potassium 3.7 3.5 - 5.3 mmol/L    Chloride 104 98 - 107 mmol/L    Bicarbonate 23 21 - 32 mmol/L    Anion Gap 13 10 - 20 mmol/L    Urea Nitrogen 17 6 - 23 mg/dL    Creatinine 1.02 0.50 - 1.30 mg/dL    eGFR 82 >60 mL/min/1.73m*2    Calcium 8.7 8.6 - 10.3 mg/dL   Hepatic function panel    Collection Time: 02/27/24  7:33 AM   Result Value Ref Range    Albumin 4.2 3.4 - 5.0 g/dL    Bilirubin, Total 0.5 0.0 - 1.2 mg/dL    Bilirubin, Direct 0.1 0.0 - 0.3 mg/dL    Alkaline Phosphatase 50 33 - 136 U/L    ALT 23 10 - 52 U/L    AST 20 9 - 39 U/L    Total Protein 6.7 6.4 - 8.2 g/dL   Protime-INR    Collection Time: 02/27/24  7:33 AM   Result Value Ref Range    Protime 11.1 9.8 - 12.8 seconds    INR 1.0 0.9 - 1.1   Troponin I, High Sensitivity    Collection Time: 02/27/24  7:33 AM   Result Value Ref Range    Troponin I, High Sensitivity 8 0 - 20 ng/L   Sedimentation rate, automated    Collection Time: 02/27/24  7:33 AM   Result Value Ref Range    Sedimentation Rate 8 0 - 20 mm/h   C-reactive protein    Collection Time: 02/27/24  7:33 AM   Result Value Ref Range    C-Reactive Protein 0.43 <1.00 mg/dL   B-Type Natriuretic Peptide    Collection Time: 02/27/24  7:33 AM   Result Value Ref Range    BNP 22 0 - 99 pg/mL   Lipid Panel    Collection Time: 02/27/24  7:33 AM   Result Value Ref Range    Cholesterol 245 (H) 0 - 199 mg/dL    HDL-Cholesterol 46.6 mg/dL     Cholesterol/HDL Ratio 5.3     LDL Calculated 139 (H) <=99 mg/dL    VLDL 60 (H) 0 - 40 mg/dL    Triglycerides 298 (H) 0 - 149 mg/dL    Non HDL Cholesterol 198 (H) 0 - 149 mg/dL   ECG 12 lead    Collection Time: 02/27/24  7:41 AM   Result Value Ref Range    Ventricular Rate 70 BPM    Atrial Rate 70 BPM    AL Interval 196 ms    QRS Duration 110 ms    QT Interval 406 ms    QTC Calculation(Bazett) 438 ms    P Axis 67 degrees    R Axis 15 degrees    T Axis 36 degrees    QRS Count 11 beats    Q Onset 217 ms    P Onset 119 ms    P Offset 180 ms    T Offset 420 ms    QTC Fredericia 427 ms   Hepatic Function Panel    Collection Time: 02/27/24  9:13 AM   Result Value Ref Range    Albumin 4.0 3.4 - 5.0 g/dL    Bilirubin, Total 0.5 0.0 - 1.2 mg/dL    Bilirubin, Direct 0.1 0.0 - 0.3 mg/dL    Alkaline Phosphatase 47 33 - 136 U/L    ALT 22 10 - 52 U/L    AST 18 9 - 39 U/L    Total Protein 6.2 (L) 6.4 - 8.2 g/dL   Transthoracic Echo (TTE) Complete    Collection Time: 02/27/24  2:28 PM   Result Value Ref Range    AV mn grad 18.0 mmHg    AV pk eduar 3.21 m/s    LV biplane EF 55 %    MV E/A ratio 0.67     Tricuspid annular plane systolic excursion 2.6 cm    MV avg E/e' ratio 12.23     LA vol index A/L 36.2 ml/m2    RV free wall pk S' 19.40 cm/s    RVSP 17.6 mmHg    LVIDd 5.16 cm    AV pk grad 41.2 mmHg    LV A4C EF 57.1       NIH Stroke Scale  1A. Level of Consciousness: Alert, Keenly Responsive  1B. Ask Month and Age: Both Questions Right  1C. Blink Eyes & Squeeze Hands: Performs Both Tasks  2. Best Gaze: Normal  3. Visual: No Visual Loss  4. Facial Palsy: Normal Symmetrical Movements  5A. Motor - Left Arm: No Drift  5B. Motor - Right Arm: No Drift  6A. Motor - Left Leg: No Drift  6B. Motor - Right Leg: No Drift  7. Limb Ataxia: Absent  8. Sensory Loss: Normal  9. Best Language: No Aphasia  10. Dysarthria: Normal  11. Extinction and Inattention: No Abnormality  NIH Stroke Scale: 0           [place] Coma Scale  Best Eye Response:  Spontaneous  Best Verbal Response: Oriented  Best Motor Response: Follows commands  [place] Coma Scale Score: 15              CMP:    Results from last 7 days   Lab Units 02/27/24  0913 02/27/24  0733   SODIUM mmol/L  --  136   POTASSIUM mmol/L  --  3.7   CHLORIDE mmol/L  --  104   CO2 mmol/L  --  23   BUN mg/dL  --  17   CREATININE mg/dL  --  1.02   GLUCOSE mg/dL  --  154*   PROTEIN TOTAL g/dL 6.2* 6.7   CALCIUM mg/dL  --  8.7   BILIRUBIN TOTAL mg/dL 0.5 0.5   ALK PHOS U/L 47 50   AST U/L 18 20   ALT U/L 22 23       Lipid Panel:  Results from last 7 days   Lab Units 02/27/24  0733   HDL mg/dL 46.6   CHOLESTEROL/HDL RATIO  5.3   VLDL mg/dL 60*   TRIGLYCERIDES mg/dL 298*   NON HDL CHOL. mg/dL 198*            No MRI head results found for the past 14 days  CT head wo IV contrast    Result Date: 2/27/2024  Interpreted By:  Mikey Valencia, STUDY: CT HEAD WO IV CONTRAST;  2/27/2024 7:44 am   INDICATION: Signs/Symptoms:weak.   COMPARISON: 11/29/2022   ACCESSION NUMBER(S): [id number]   ORDERING CLINICIAN: TERRANCE MARINELLI   TECHNIQUE: Contiguous unenhanced axial CT sections are performed from the skull base to the vertex.   FINDINGS: The osseous structures are intact. There is mild mucoperiosteal thickening of the ethmoid air cells.   There is mild to moderate generalized parenchymal atrophy with symmetric enlargement of the cortical sulci, more pronounced over the frontal convexities. This appearance is unchanged in the interval.   There is no sign of parenchymal hematoma or dense extra-axial fluid collection. There is no localized edema, mass effect, or shift of the midline. The gray matter/white-matter differentiation is preserved.       No CT evidence of acute intracranial abnormality or interval change from 11/29/2022.   Signed by: Mikey Valencia 2/27/2024 8:00 AM Dictation workstation:   [id number]   Transthoracic Echo (TTE) Complete    Result Date: 2/27/2024          [place] [address]  [place] [address]  Tel [phone] Fax [phone] TRANSTHORACIC ECHOCARDIOGRAM REPORT  Patient Name:      PRATIK MARTINEZ       Reading Physician:    [id number] Mikey Lucia DO Study Date:        2/27/2024            Ordering Provider:    [id number] RADHA GONZÁLEZ                                                               BELKIS MRN/PID:           [id number]             Fellow: Accession#:        [id number]         Nurse:                Chiki Gutierrez RN Date of Birth/Age: 1956 / 67 years Sonographer:          Ena Pacheco RDMARY ELLEN Gender:            M                    Additional Staff: Height:            172.72 cm            Admit Date:           2/27/2024 Weight:            94.35 kg             Admission Status:     Inpatient -                                                               Routine BSA / BMI:         2.08 m2 / 31.63      Department Location:  [place] [address]                                      Echo Lab Blood Pressure: 134 /85 mmHg Study Type:    TRANSTHORACIC ECHO (TTE) COMPLETE Diagnosis/ICD: Transient cerebral ischemic attack, unspecified (NOT on                LCD)-G45.9; Other transient cerebral ischemic attacks and related                syndromes-G45.8 Indication:    TIA/CVA; Transient Monocular Blindness (Left); Hx TAVR CPT Codes:     Echo Complete w Full [id number] Patient History: Valve Disorders:   Aortic Valve Replacement. 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                    S/P TAVR. Study Detail: The following Echo studies were performed: 2D, M-Mode, Doppler and               color flow. Technically challenging study due to prominent lung               artifact. Agitated saline used as a contrast agent for intraseptal               flow evaluation.  PHYSICIAN INTERPRETATION:  Left Ventricle: Left ventricular systolic function is normal, with an estimated ejection fraction of 55%. There are no regional wall motion abnormalities. The left ventricular cavity size is normal. There is mild to moderate concentric left ventricular hypertrophy. Spectral Doppler shows an impaired relaxation pattern of left ventricular diastolic filling. LV Wall Scoring: All segments are normal. Left Atrium: The left atrium is mildly dilated. A bubble study using agitated saline was performed. Bubble study is negative. Right Ventricle: The right ventricle is normal in size. There is normal right ventricular global systolic function. Right Atrium: The right atrium is normal in size. Aortic Valve: There is a prosthetic aortic valve present. There is no evidence of aortic valve stenosis. There is an De La Torre transcatheter aortic valve replacement, with a 26 mm reported size. Echo findings are consistent with normal aortic valve prosthesis structure and function. There is no evidence of aortic valve regurgitation. The peak instantaneous gradient of the aortic valve is 41.2 mmHg. The mean gradient of the aortic valve is 18.0 mmHg. Mitral Valve: The mitral valve is normal in structure. There is no evidence of mitral valve stenosis. There is normal mitral valve leaflet mobility. There is trace mitral valve regurgitation. Tricuspid Valve: The tricuspid valve is structurally normal. There is normal tricuspid valve leaflet mobility. There is trace tricuspid regurgitation. Pulmonic Valve: The pulmonic valve is structurally normal. There is no indication of pulmonic valve regurgitation. Pericardium: There is no pericardial effusion noted. Aorta: The aortic root is normal. Pulmonary Artery: The main pulmonary artery is normal in size, and position, with normal bifurcation into the left and right pulmonary arteries. Systemic Veins: The inferior vena cava appears to be of normal size. In comparison to the previous  echocardiogram(s): Prior examinations are available and were reviewed for comparison purposes. The left ventricular function is unchanged. The left ventricular hypertrophy is worse. The left ventricular diastolic function is unchanged.  CONCLUSIONS:  1. Left ventricular systolic function is normal with a 55% estimated ejection fraction.  2. Spectral Doppler shows an impaired relaxation pattern of left ventricular diastolic filling.  3. There is no evidence of mitral valve stenosis.  4. Trace mitral valve regurgitation.  5. Trace tricuspid regurgitation is visualized.  6. Aortic valve stenosis is not present.  7. There is a transcatheter aortic valve replacement.  8. The main pulmonary artery is normal in size, and position, with normal bifurcation into the left and right pulmonary arteries. RECOMMENDATIONS: Technically suboptimal and limited study, therefore accuracy of above interpretation could be substantially diminished. Clinical correlation is advised. Consider additional imaging modalities if clinically indicated. Transthoracic echo has low negative predictive value for mass, vegetation, or embolic source. Consider JUDIT or MRI if clinically indicated.  QUANTITATIVE DATA SUMMARY: 2D MEASUREMENTS:                           Normal Ranges: Ao Root d:     3.40 cm    (2.0-3.7cm) IVSd:          1.35 cm    (0.6-1.1cm) LVPWd:         1.19 cm    (0.6-1.1cm) LVIDd:         5.16 cm    (3.9-5.9cm) LVIDs:         2.32 cm LV Mass Index: 128.1 g/m2 LV % FS        55.0 % LA VOLUME:                               Normal Ranges: LA Vol A4C:        66.0 ml    (22+/-6mL/m2) LA Vol A2C:        76.9 ml LA Vol BP:         75.2 ml LA Vol Index A4C:  31.8ml/m2 LA Vol Index A2C:  37.0 ml/m2 LA Vol Index BP:   36.2 ml/m2 LA Area A4C:       20.1 cm2 LA Area A2C:       22.9 cm2 LA Major Axis A4C: 5.2 cm LA Major Axis A2C: 5.8 cm LA Volume Index:   34.6 ml/m2 LA Vol A4C:        64.0 ml LA Vol A2C:        74.0 ml RA VOLUME BY A/L METHOD:                                Normal Ranges: RA Vol A4C:        43.0 ml    (8.3-19.5ml) RA Vol Index A4C:  20.7 ml/m2 RA Area A4C:       15.9 cm2 RA Major Axis A4C: 5.0 cm LV SYSTOLIC FUNCTION BY 2D PLANIMETRY (MOD):                     Normal Ranges: EF-A4C View: 57.1 % (>=55%) EF-A2C View: 55.0 % EF-Biplane:  55.0 % LV DIASTOLIC FUNCTION:                        Normal Ranges: MV Peak E:    0.80 m/s (0.7-1.2 m/s) MV Peak A:    1.19 m/s (0.42-0.7 m/s) E/A Ratio:    0.67     (1.0-2.2) MV e'         0.06 m/s (>8.0) MV lateral e' 0.07 m/s MV medial e'  0.06 m/s E/e' Ratio:   12.23    (<8.0) MITRAL VALVE:                 Normal Ranges: MV DT: 230 msec (150-240msec) AORTIC VALVE:                                    Normal Ranges: AoV Vmax:                3.21 m/s  (<=1.7m/s) AoV Peak P.2 mmHg (<20mmHg) AoV Mean P.0 mmHg (1.7-11.5mmHg) LVOT Max Escobar:            0.96 m/s  (<=1.1m/s) AoV VTI:                 60.80 cm  (18-25cm) LVOT VTI:                19.00 cm AoV Dimensionless Index: 0.31  RIGHT VENTRICLE: TAPSE: 26.0 mm RV s'  0.19 m/s TRICUSPID VALVE/RVSP:                             Normal Ranges: Peak TR Velocity: 1.91 m/s RV Syst Pressure: 17.6 mmHg (< 30mmHg) PULMONIC VALVE:                         Normal Ranges: PV Accel Time: 108 msec (>120ms) PV Max Escobar:    0.8 m/s  (0.6-0.9m/s) PV Max P.5 mmHg PV Mean P.0 mmHg PV VTI:        16.70 cm  Brittany Lucia DO Electronically signed on 2024 at 3:15:00 PM  Wall Scoring  ** Final **          BNP   Date/Time Value Ref Range Status   2024 07:33 AM 22 0 - 99 pg/mL Final        I have personally reviewed the following imaging results Transthoracic Echo (TTE) Complete    Result Date: 2024          [place] [address]  Tel [phone] Fax [phone] TRANSTHORACIC ECHOCARDIOGRAM REPORT  Patient Name:      PRATIK MARTINEZ       Reading Physician:    [id number] Mikey                                                                [place] DO Study Date:        2/27/2024            Ordering Provider:    [id number] NEABBI TAMAYO MRN/PID:           [id number]             Fellow: Accession#:        [id number]         Nurse:                Chiki Gutierrez RN Date of Birth/Age: 1956 / 67 years Sonographer:          Ena Pacheco RDCS Gender:            M                    Additional Staff: Height:            172.72 cm            Admit Date:           2/27/2024 Weight:            94.35 kg             Admission Status:     Inpatient -                                                               Routine BSA / BMI:         2.08 m2 / 31.63      Department Location:  [facility]                    kg/m2                                      Echo Lab Blood Pressure: 134 /85 mmHg Study Type:    TRANSTHORACIC ECHO (TTE) COMPLETE Diagnosis/ICD: Transient cerebral ischemic attack, unspecified (NOT on                LCD)-G45.9; Other transient cerebral ischemic attacks and related                syndromes-G45.8 Indication:    TIA/CVA; Transient Monocular Blindness (Left); Hx TAVR CPT Codes:     Echo Complete w Full Doppler-[id number] Patient History: Valve Disorders:   Aortic Valve Replacement. 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                    S/P TAVR. Study Detail: The following Echo studies were performed: 2D, M-Mode, Doppler and               color flow. Technically challenging study due to prominent lung               artifact. Agitated saline used as a contrast agent for intraseptal               flow evaluation.  PHYSICIAN INTERPRETATION: Left Ventricle: Left ventricular systolic function is normal, with an estimated ejection fraction of 55%. There are no regional wall motion abnormalities. The left ventricular cavity size is  normal. There is mild to moderate concentric left ventricular hypertrophy. Spectral Doppler shows an impaired relaxation pattern of left ventricular diastolic filling. LV Wall Scoring: All segments are normal. Left Atrium: The left atrium is mildly dilated. A bubble study using agitated saline was performed. Bubble study is negative. Right Ventricle: The right ventricle is normal in size. There is normal right ventricular global systolic function. Right Atrium: The right atrium is normal in size. Aortic Valve: There is a prosthetic aortic valve present. There is no evidence of aortic valve stenosis. There is an De La Torre transcatheter aortic valve replacement, with a 26 mm reported size. Echo findings are consistent with normal aortic valve prosthesis structure and function. There is no evidence of aortic valve regurgitation. The peak instantaneous gradient of the aortic valve is 41.2 mmHg. The mean gradient of the aortic valve is 18.0 mmHg. Mitral Valve: The mitral valve is normal in structure. There is no evidence of mitral valve stenosis. There is normal mitral valve leaflet mobility. There is trace mitral valve regurgitation. Tricuspid Valve: The tricuspid valve is structurally normal. There is normal tricuspid valve leaflet mobility. There is trace tricuspid regurgitation. Pulmonic Valve: The pulmonic valve is structurally normal. There is no indication of pulmonic valve regurgitation. Pericardium: There is no pericardial effusion noted. Aorta: The aortic root is normal. Pulmonary Artery: The main pulmonary artery is normal in size, and position, with normal bifurcation into the left and right pulmonary arteries. Systemic Veins: The inferior vena cava appears to be of normal size. In comparison to the previous echocardiogram(s): Prior examinations are available and were reviewed for comparison purposes. The left ventricular function is unchanged. The left ventricular hypertrophy is worse. The left ventricular  diastolic function is unchanged.  CONCLUSIONS:  1. Left ventricular systolic function is normal with a 55% estimated ejection fraction.  2. Spectral Doppler shows an impaired relaxation pattern of left ventricular diastolic filling.  3. There is no evidence of mitral valve stenosis.  4. Trace mitral valve regurgitation.  5. Trace tricuspid regurgitation is visualized.  6. Aortic valve stenosis is not present.  7. There is a transcatheter aortic valve replacement.  8. The main pulmonary artery is normal in size, and position, with normal bifurcation into the left and right pulmonary arteries. RECOMMENDATIONS: Technically suboptimal and limited study, therefore accuracy of above interpretation could be substantially diminished. Clinical correlation is advised. Consider additional imaging modalities if clinically indicated. Transthoracic echo has low negative predictive value for mass, vegetation, or embolic source. Consider JUDIT or MRI if clinically indicated.  QUANTITATIVE DATA SUMMARY: 2D MEASUREMENTS:                           Normal Ranges: Ao Root d:     3.40 cm    (2.0-3.7cm) IVSd:          1.35 cm    (0.6-1.1cm) LVPWd:         1.19 cm    (0.6-1.1cm) LVIDd:         5.16 cm    (3.9-5.9cm) LVIDs:         2.32 cm LV Mass Index: 128.1 g/m2 LV % FS        55.0 % LA VOLUME:                               Normal Ranges: LA Vol A4C:        66.0 ml    (22+/-6mL/m2) LA Vol A2C:        76.9 ml LA Vol BP:         75.2 ml LA Vol Index A4C:  31.8ml/m2 LA Vol Index A2C:  37.0 ml/m2 LA Vol Index BP:   36.2 ml/m2 LA Area A4C:       20.1 cm2 LA Area A2C:       22.9 cm2 LA Major Axis A4C: 5.2 cm LA Major Axis A2C: 5.8 cm LA Volume Index:   34.6 ml/m2 LA Vol A4C:        64.0 ml LA Vol A2C:        74.0 ml RA VOLUME BY A/L METHOD:                               Normal Ranges: RA Vol A4C:        43.0 ml    (8.3-19.5ml) RA Vol Index A4C:  20.7 ml/m2 RA Area A4C:       15.9 cm2 RA Major Axis A4C: 5.0 cm LV SYSTOLIC FUNCTION BY 2D  PLANIMETRY (MOD):                     Normal Ranges: EF-A4C View: 57.1 % (>=55%) EF-A2C View: 55.0 % EF-Biplane:  55.0 % LV DIASTOLIC FUNCTION:                        Normal Ranges: MV Peak E:    0.80 m/s (0.7-1.2 m/s) MV Peak A:    1.19 m/s (0.42-0.7 m/s) E/A Ratio:    0.67     (1.0-2.2) MV e'         0.06 m/s (>8.0) MV lateral e' 0.07 m/s MV medial e'  0.06 m/s E/e' Ratio:   12.23    (<8.0) MITRAL VALVE:                 Normal Ranges: MV DT: 230 msec (150-240msec) AORTIC VALVE:                                    Normal Ranges: AoV Vmax:                3.21 m/s  (<=1.7m/s) AoV Peak P.2 mmHg (<20mmHg) AoV Mean P.0 mmHg (1.7-11.5mmHg) LVOT Max Escobar:            0.96 m/s  (<=1.1m/s) AoV VTI:                 60.80 cm  (18-25cm) LVOT VTI:                19.00 cm AoV Dimensionless Index: 0.31  RIGHT VENTRICLE: TAPSE: 26.0 mm RV s'  0.19 m/s TRICUSPID VALVE/RVSP:                             Normal Ranges: Peak TR Velocity: 1.91 m/s RV Syst Pressure: 17.6 mmHg (< 30mmHg) PULMONIC VALVE:                         Normal Ranges: PV Accel Time: 108 msec (>120ms) PV Max Escobar:    0.8 m/s  (0.6-0.9m/s) PV Max P.5 mmHg PV Mean P.0 mmHg PV VTI:        16.70 cm  [id number] Mikey Lucia DO Electronically signed on 2024 at 3:15:00 PM  Wall Scoring  ** Final **     Vascular US carotid artery duplex bilateral    Result Date: 2024  Interpreted By:  Regan Ramirez, STUDY: [facility] US CAROTID ARTERY DUPLEX BILATERAL;  2024 10:18 am   INDICATION: Signs/Symptoms:transient  mononcular vision loss.   COMPARISON: None.   ACCESSION NUMBER(S): [id number]   ORDERING CLINICIAN: RADHA TAMAYO   TECHNIQUE: Vascular ultrasound of the extracranial carotid system was performed bilaterally.  Gray scale, color Doppler and spectral Doppler waveform analysis was performed.   FINDINGS: RIGHT:     RIGHT SIDE PEAK SYSTOLIC VELOCITIES: CCA (distal): 62 cm/sec. There is mild intimal thickening. ICA: 54 cm/sec.  There is minimal plaque formation at the carotid bulb. ECA: 123 cm/sec.   END-DIASTOLIC VELOCITY Common carotid artery : 18 cm/sec Internal carotid:22 cm/sec   The ratio of the peak systolic velocity of the right ICA/CCA is 0.9.   RIGHT VERTEBRAL ARTERY: Antegrade flow   LEFT:     LEFT SIDE PEAK SYSTOLIC VELOCITIES: CCA (distal): 82 cm/sec. There is mild intimal thickening, and tortuosity. ICA: 83 cm/sec. Mild atherosclerotic plaque at the carotid bulb and proximal segment of the internal carotid artery, greater than on the right. ECA: 131 cm/sec.   END-DIASTOLIC VELOCITY Common carotid artery : 10 cm/sec Internal carotid:33 cm/sec   The ratio of the peak systolic velocity of the left ICA/CCA is 1.   LEFT VERTEBRAL ARTERY: Antegrade flow       Mild atherosclerotic plaque at the carotid bulbs, greater on the left, and at the left internal carotid artery proximally with less than 50% diameter stenosis.   The velocity criteria are extrapolated from diameter data as defined by the Society of Radiologists in Ultrasound Consensus Conference Radiology 2003; 229;340-346.   Signed by: Regan Ramirez 2/27/2024 10:51 AM Dictation workstation:   [id number]    XR chest 1 view    Result Date: 2/27/2024  Interpreted By:  Mikey Valencia, STUDY: XR CHEST 1 VIEW;  2/27/2024 7:52 am   INDICATION: Signs/Symptoms:weak.   COMPARISON: 01/04/2023   ACCESSION NUMBER(S): [id number]   ORDERING CLINICIAN: TERRANCE MARINELLI   TECHNIQUE: A portable radiograph of the chest is performed. The study is limited by lordotic positioning.   FINDINGS: The heart is mildly enlarged. There is minimal subsegmental atelectasis in the right lung base. The lungs and pleural spaces are otherwise clear. There is no pleural effusion or pneumothorax. The pulmonary vessels are within normal limits.       Minimal subsegmental atelectasis in the right lung base.   Mild cardiomegaly.   Otherwise no sign of acute cardiopulmonary disease.   Signed by: Mikey  Erik 2/27/2024 8:01 AM Dictation workstation:   [id number]    CT head wo IV contrast    Result Date: 2/27/2024  Interpreted By:  Mikey Valencia, STUDY: CT HEAD WO IV CONTRAST;  2/27/2024 7:44 am   INDICATION: Signs/Symptoms:weak.   COMPARISON: 11/29/2022   ACCESSION NUMBER(S): [id number]   ORDERING CLINICIAN: TERRANCE MARINELLI   TECHNIQUE: Contiguous unenhanced axial CT sections are performed from the skull base to the vertex.   FINDINGS: The osseous structures are intact. There is mild mucoperiosteal thickening of the ethmoid air cells.   There is mild to moderate generalized parenchymal atrophy with symmetric enlargement of the cortical sulci, more pronounced over the frontal convexities. This appearance is unchanged in the interval.   There is no sign of parenchymal hematoma or dense extra-axial fluid collection. There is no localized edema, mass effect, or shift of the midline. The gray matter/white-matter differentiation is preserved.       No CT evidence of acute intracranial abnormality or interval change from 11/29/2022.   Signed by: Mikey Valencia 2/27/2024 8:00 AM Dictation workstation:   [id number]    ECG 12 lead    Result Date: 2/27/2024  Normal sinus rhythm Normal ECG When compared with ECG of 04-JAN-2023 07:47, Previous ECG has undetermined rhythm, needs review  .            Assessment/Plan   1.  Transient loss of vision in the left eye most likely left amaurosis fugax needs to rule out partial seizure.    2.  History of hypertension.    3.  History of hyperlipidemia.    Plan continue with aspirin and I would add statin at this Lipitor .  Continue with the current stroke protocol and will recommend an MRI of the head and MRA of the head and neck to look for any intracranial stenosis and depending on that might make future recommendation.  Will get EEG to look for any epileptiform discharges.  Patient is allergic to Plavix and depending on the workup may need 325 mg aspirin or  might switch to Aggrenox at a later date    Continue with the stroke workup and signs symptoms a risk factor of CVA bleeding risk and the precautions were discussed at great length.  Would recommend a hemoglobin A1c since patient blood sugar was 154.    Will follow with you            NIHSS (worst at presentation):     Vascular Risk Factor modification goals:  Blood pressure goals: avoid hypotension SBP <100 that could worsen cerebral perfusion,   Lipid Goals: education on healthy diet and   Glucose Goals: early treatment of hyperglycemia to goal glucose 140-180 mg/dl with long-term goal A1c < 7%   Smoking Cessation and Education  Assessment for Rehabilitation needs   Patient and family education on signs and symptoms of stroke, calling 911, healthy strategies for stroke prevention.           Due to technical limitations of voice recognition and human error, this note may not accurately reflect the care of the patient.   Orestes Gupta MD   alert/follows commands

## 2024-04-23 NOTE — ED PROVIDER NOTE - PRESENTATION SUGGESTIVE OF:
Individual Psychotherapy (LCSW)  Dalia MARIE Mariana,  4/23/2024  DATE:  4/23/2024  TYPE OF VISIT:  In person  LENGTH OF SESSION: 45    Therapeutic Intervention: Met with patient for individual psychotherapy.    Chief complaint/reason for encounter: anxiety and interpersonal     Session Content/Presenting Problem:    Met with patient for a 3 month follow up. Patient was busy with tax season. Also dealing with  Satnam's new cancer diagnosis and treatment. Satnam is currently doing weekly infusions at [facility].  He recently had an MRI of the head and is going in for a f/u with the doctor to discuss the results.  Patient understands that Satnam's treatment is not curative. Satnam does hope to live another 6 years to see their grandson Neal graduate from high school.  Patient spends much of her time with Satnam but is trying to carve out at least an hour per week for herself.  She names things she enjoys doing such as walking in the park and going to the library. She is happy to report that Neal has become more independent in the last few months. He is sleeping and bathing on his own. He does still come to them if he can't sleep for some reason.  He is doing well in school.  Patient brought him with them to attend a healing service in [place]; says Neal cried a lot there but she is not sure what prompted that. He does not seem fully aware of what is going on with his grandfather.  Discussed finding a new pediatric counselor for Neal; discussed possible availability of same through [facility].  Patient has also been busy assisting the rest of the family. She describes a poor living situation for her sister, Ruth and Ruth's s.o.  at [employer]. Patient is there often, cleaning the bathroom and kitchen, buying them supplies of depends and groceries, etc. She is beginning her search for a nursing home; discussed possible resources and options for both Marina and her s.o. to move to a NH together.  She has also  "been helping her nephew with his taxes and financial needs.  Discussed their son Rashaad's offer to come from [place] on a regular basis; she will encourage him to stay and work until she truly needs him. She is hopeful that Neal will spend a good part of the summer with Rashaad in [place]. Patient's younger sister does come every other week to help out with Ruth. Their older sister does not get along with Ruth so does not come too often.    Patient is hopeful that she will take good enough care of herself that she will be able to continue to care for Neal Hay, and her sister. She bought a swim suit and plans to spend time at their neighborhood pool this year. She helped with cleanup at the pool to get ready for the season. She is sleeping better.  She will have a sleep study in May; her son is concerned she may have some sleep apnea. She is only taking Buspar once a day, no longer taking it at night.  Patient believes she is managing their situation well. She requests a follow up appt in 6 weeks.       Past Session:    Patient's spouse Satnam has been sick, in ER, lots of specialists and procedures. Had JESUS and A Fib, mobility issues. Has been caring for him for a while. Had a week of fever. PET scan tomorrow.  Possible lymphoma. Worried about him.  He wants her with him all the time.  Says "It's a burden to feel needed by so many."  Neal went to [place]; trip was "spectacular". Really good time.  Good relationship with Uncle Rashaad and his wife.   Holidays are hard, always the anxiety with preparation, etc. Preparing for Neal was easy this year; able to shop with him.   Neal had gotten sick with stomach virus during exam week. Got his exams done; had strep throat. He was sick and did not study for the exams. Does not want help with studying, but he did fine. Neal has been able to walk to bus on his own now. Trying to encourage him to eat variety of foods.   Will be busy with tax returns soon.  Helping her nephew move " "Marina into assisted living.  Plans to have a meeting with the sisters and her nephew.   Marina's partner of 16 years lives with her. Neither has any money anymore. He has a daughter and other kids.  Sandy is the go to person in the family to take care of all important decisions.  Patient sometimes worries about all of the people that depend on her. She worries especially about Satnam and how she would care for Neal if Satnam ends up hospitalized.  Discussed possible options. Encouraged patient to be proactive in having options before they are needed. She does have a long time , Verito, who is very involved with her family. Neal has even stayed the night at Verito's house. Verito is busy with her own family so limited assistance. Encouraged patient to ask Verito if she may have other people that could possibly help.  Their son Rashaad may come home if Satnam has to be hospitalized; Rashaad is currently not working so has some free time.  Patient also mentions eNal's only . Leigh, who could be available if needed. Patient also mentions two neighbors who are helpful.  Limited but good support system.  Discussed patient caring for herself; she says she is good about practicing self care. She has a BAUNAT puzzle; she is currently reading 2 books.  Gets herself some treats-cherry pies.  "You have to make your own happiness"  After Neal goes to school she enjoys quiet time at home during which she reads and prays.  Encouraged her to nap if needed as she does say she may not be sleeping enough hours at night.    Patient requests a follow up after April 15th due to tax season.         Current symptoms:  Depression: hopelessness and fatigue.  Anxiety: excessive worrying.  Insomnia: non-restful sleep.  Deneen:  denies.  Psychosis: denies .      Risk parameters:  Patient reports no suicidal ideation  Patient reports no homicidal ideation  Patient reports no self-injurious behavior  Patient reports no violent " behavior    MENTAL HEALTH STATUS EXAM  General Appearance:  unremarkable, age appropriate   Speech: normal tone, normal rate, normal pitch, normal volume      Level of Cooperation: cooperative      Thought Processes: normal and logical   Mood: steady      Thought Content: normal, no suicidality, no homicidality, delusions, or paranoia   Affect: congruent and appropriate   Orientation: Oriented x3   Attention Span & Concentration: intact   Fund of General Knowledge: intact and appropriate to age and level of education   Judgment & Insight: good     Language  intact       STRENGTHS AND LIABILITIES: Strength: Patient accepts guidance/feedback, Strength: Patient is expressive/articulate., Strength: Patient is intelligent., Strength: Patient is stable.    IMPRESSION:   My diagnostic impression is Anxiety disorders; anxiety, unspecified [F41.9], as evidenced by patient report of worrying too much, not being able to stop worrying, worrying about many things.     TREATMENT GOALS (per patient):    To manage anxiety related to 's cancer diagnosis; to take care of herself. To manage worry related to raising their 12 year old grandson, Neal.       Treatment plan:  Target symptoms: anxiety , adjustment  Why chosen therapy is appropriate versus another modality: relevant to diagnosis, patient responds to this modality, evidence based practice  Outcome monitoring methods: self-report, observation, checklist/rating scale  Therapeutic intervention type: insight oriented psychotherapy    Patient's response to intervention:  The patient's response to intervention is accepting.    Progress toward goals and other mental status changes:  The patient's progress toward goals is good.    Plan: Pt plans to continue individual psychotherapy CBT will be utilized in future individual therapy sessions to increase interaction, insight, support, and parent/behavior management.     Return to clinic: 6 weeks June 11th               Bacterial Etiology

## 2024-07-03 NOTE — BH CONSULTATION LIAISON PROGRESS NOTE - NSBHMSESPEECH_PSY_A_CORE
Normal volume, rate, productivity, spontaneity and articulation

## 2024-07-25 NOTE — ED BEHAVIORAL HEALTH ASSESSMENT NOTE - NSPRESENTSXS_PSY_ALL_CORE
[FreeTextEntry1] : I had a discussion regarding today's visit, the diagnosis and treatment recommendations and options.  We also discussed changes since the last visit.  At this time, I recommended   The patient has agreed to the above plan of management and has expressed full understanding.  All questions were fully answered to the patient's satisfaction.  My cumulative time spent on today's visit was greater than 30 minutes and included: Preparation for the visit, review of the medical records, review of pertinent diagnostic studies, examination and counseling of the patient on the above diagnosis, treatment plan and prognosis, orders of diagnostic tests, medications and/or appropriate procedures and documentation in the medical records of today's visit. Depressed mood/Anhedonia

## 2024-07-28 NOTE — BH CONSULTATION LIAISON ASSESSMENT NOTE - DOMICILED WITH
Problem: Discharge Planning  Goal: Discharge to home or other facility with appropriate resources  7/27/2024 2359 by Almita Storm, RN  Outcome: Progressing     Problem: Chronic Conditions and Co-morbidities  Goal: Patient's chronic conditions and co-morbidity symptoms are monitored and maintained or improved  7/27/2024 2359 by Almita Storm RN  Outcome: Progressing     Problem: Safety - Adult  Goal: Free from fall injury  7/27/2024 2359 by Almita Storm, RN  Outcome: Progressing     Problem: Pain  Goal: Verbalizes/displays adequate comfort level or baseline comfort level  7/27/2024 2359 by Almita Storm, RN  Outcome: Progressing     Problem: Skin/Tissue Integrity  Goal: Absence of new skin breakdown  Description: 1.  Monitor for areas of redness and/or skin breakdown  2.  Assess vascular access sites hourly  3.  Every 4-6 hours minimum:  Change oxygen saturation probe site  4.  Every 4-6 hours:  If on nasal continuous positive airway pressure, respiratory therapy assess nares and determine need for appliance change or resting period.  7/27/2024 2359 by Almita Storm, RN  Outcome: Progressing     Problem: ABCDS Injury Assessment  Goal: Absence of physical injury  7/27/2024 2359 by Almita Storm, RN  Outcome: Progressing      Family
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
## 2025-03-18 NOTE — PHYSICAL THERAPY INITIAL EVALUATION ADULT - IMPAIRED TRANSFERS: BED/CHAIR, REHAB EVAL
Patient has been scheduled.    impaired balance/impaired coordination/decreased flexibility/impaired postural control/decreased ROM/decreased strength